# Patient Record
Sex: FEMALE | Race: WHITE | NOT HISPANIC OR LATINO | Employment: OTHER | URBAN - METROPOLITAN AREA
[De-identification: names, ages, dates, MRNs, and addresses within clinical notes are randomized per-mention and may not be internally consistent; named-entity substitution may affect disease eponyms.]

---

## 2022-01-10 DIAGNOSIS — I48.0 PAROXYSMAL ATRIAL FIBRILLATION (HCC): Primary | ICD-10-CM

## 2022-01-13 DIAGNOSIS — E78.49 OTHER HYPERLIPIDEMIA: Primary | ICD-10-CM

## 2022-01-13 NOTE — TELEPHONE ENCOUNTER
Patient only has one pill left and no refills  Her home health aid called and was unsure if she should still be taking atorvatastin 40 mg  Please send to [facility] if appropriate

## 2022-01-14 RX ORDER — ATORVASTATIN CALCIUM 40 MG/1
40 TABLET, FILM COATED ORAL DAILY
Qty: 30 TABLET | Refills: 2 | Status: SHIPPED | OUTPATIENT
Start: 2022-01-14 | End: 2022-04-26

## 2022-01-18 PROBLEM — G81.90 HEMIPLEGIA (HCC): Status: ACTIVE | Noted: 2021-10-15

## 2022-01-18 PROBLEM — E78.2 MIXED HYPERLIPIDEMIA: Status: ACTIVE | Noted: 2021-10-15

## 2022-01-18 PROBLEM — I48.0 PAROXYSMAL ATRIAL FIBRILLATION (HCC): Status: ACTIVE | Noted: 2021-10-15

## 2022-01-18 PROBLEM — Z72.0 TOBACCO ABUSE: Status: ACTIVE | Noted: 2021-10-15

## 2022-01-18 PROBLEM — I63.512 ACUTE ISCHEMIC LEFT MCA STROKE (HCC): Status: ACTIVE | Noted: 2021-10-14

## 2022-01-18 NOTE — ASSESSMENT & PLAN NOTE
Continue with anticogulation and rate control of heart rate  Concerns about condition were addressed today

## 2022-01-18 NOTE — PROGRESS NOTES
BMI Counseling: There is no height or weight on file to calculate BMI  The BMI is above normal  Nutrition recommendations include decreasing portion sizes, encouraging healthy choices of fruits and vegetables, decreasing fast food intake, consuming healthier snacks, limiting drinks that contain sugar, moderation in carbohydrate intake, increasing intake of lean protein, reducing intake of saturated and trans fat and reducing intake of cholesterol  Exercise recommendations include exercising 3-5 times per week  No pharmacotherapy was ordered  Patient referred to PCP  Rationale for BMI follow-up plan is due to patient being overweight or obese  Depression Screening and Follow-up Plan: Patient was screened for depression during today's encounter  They screened negative with a PHQ-2 score of 0  Assessment/Plan:         Problem List Items Addressed This Visit        Cardiovascular and Mediastinum    Acute ischemic left MCA stroke [facility]) - Primary     Patient is stable  and will continue present plan of care and reassess at next routine visit  All questions about this problem from patient were answered today  Paroxysmal atrial fibrillation (HCC)     Continue with anticogulation and rate control of heart rate  Concerns about condition were addressed today  Other    Mixed hyperlipidemia     Patient  is stable with current medication and we discussed a low fat low cholesterol diet  Weight loss also discussed for this will help lower cholesterol also  Recheck lipids in 6 months  Tobacco abuse     Patient encouraged to quit using tobacco for that increases their risk for COPD, lung cancer,stroke, oral cancer and heart disease  If patient does not want to quit they should let me know  when they are interested in quitting  There are numerous options to use to quit and we can discuss them             Other Visit Diagnoses     Need for hepatitis C screening test        Screening for HIV (human immunodeficiency virus)        Screening for cervical cancer        Encounter for screening mammogram for breast cancer        Screening for colorectal cancer        Well adult exam                Subjective:      Patient ID: Garrett Lema is a 63 y o  female  She 7year-old female here today for annual wellness and physical   Patient with history of atrial fibrillation with a CVA with right-sided weakness  Patient history of hypertension and hyperlipidemia  Patient also with some questionable symptoms of depression  Patient not sure she was taking the medications but I will give her prescription for some Zoloft 50 mg to be taken once a day if she so desires to take that  The following portions of the patient's history were reviewed and updated as appropriate:   Past Medical History:  She has no past medical history on file ,  _______________________________________________________________________  Medical Problems:  does not have any pertinent problems on file ,  _______________________________________________________________________  Past Surgical History:   has a past surgical history that includes Ovary surgery  ,  _______________________________________________________________________  Family History:  family history is not on file ,  _______________________________________________________________________  Social History:   reports that she has quit smoking  She has never used smokeless tobacco  She reports previous alcohol use  She reports previous drug use ,  _______________________________________________________________________  Allergies:  has No Known Allergies     _______________________________________________________________________  Current Outpatient Medications   Medication Sig Dispense Refill    apixaban (ELIQUIS) 5 mg Take 1 tablet (5 mg total) by mouth 2 (two) times a day 60 tablet 11    atorvastatin (LIPITOR) 40 mg tablet Take 1 tablet (40 mg total) by mouth daily 30 tablet 2    metoprolol succinate (Toprol XL) 25 mg 24 hr tablet Take 1 tablet (25 mg total) by mouth daily 90 tablet 3     No current facility-administered medications for this visit      _______________________________________________________________________  Review of Systems   Constitutional: Negative for activity change, appetite change, fatigue and fever  HENT: Negative for congestion, ear pain, postnasal drip, rhinorrhea, sinus pressure, sinus pain, sneezing and sore throat  Eyes: Negative for pain and redness  Respiratory: Negative for apnea, cough, chest tightness, shortness of breath and wheezing  Cardiovascular: Negative for chest pain, palpitations and leg swelling  Gastrointestinal: Negative for abdominal pain, constipation, diarrhea, nausea and vomiting  Endocrine: Negative for cold intolerance and heat intolerance  Genitourinary: Negative for difficulty urinating, dysuria, frequency, hematuria and urgency  Musculoskeletal: Negative for arthralgias, back pain, gait problem and myalgias  Skin: Negative for rash  Neurological: Negative for dizziness, speech difficulty, weakness, numbness and headaches  Hematological: Does not bruise/bleed easily  Psychiatric/Behavioral: Negative for agitation, confusion and hallucinations  Objective: There were no vitals filed for this visit  There is no height or weight on file to calculate BMI  Physical Exam  Vitals and nursing note reviewed  Constitutional:       Appearance: She is well-developed  HENT:      Head: Normocephalic and atraumatic  Nose: Nose normal       Mouth/Throat:      Mouth: Mucous membranes are moist    Eyes:      General: No scleral icterus  Conjunctiva/sclera: Conjunctivae normal       Pupils: Pupils are equal, round, and reactive to light  Neck:      Thyroid: No thyromegaly  Cardiovascular:      Rate and Rhythm: Normal rate  Rhythm irregular     Pulmonary:      Effort: Pulmonary effort is normal  Breath sounds: Normal breath sounds  No wheezing  Abdominal:      General: Bowel sounds are normal  There is no distension  Palpations: Abdomen is soft  Tenderness: There is no abdominal tenderness  There is no guarding or rebound  Musculoskeletal:         General: Tenderness and deformity present  Normal range of motion  Cervical back: Normal range of motion and neck supple  Comments: Weakness on R side  Skin:     General: Skin is warm and dry  Findings: No erythema or rash  Neurological:      Mental Status: She is alert and oriented to person, place, and time  Sensory: No sensory deficit  Motor: Weakness present  Gait: Gait abnormal    Psychiatric:         Behavior: Behavior normal          Thought Content:  Thought content normal          Judgment: Judgment normal

## 2022-01-19 ENCOUNTER — OFFICE VISIT (OUTPATIENT)
Dept: FAMILY MEDICINE CLINIC | Facility: CLINIC | Age: 62
End: 2022-01-19
Payer: COMMERCIAL

## 2022-01-19 VITALS
OXYGEN SATURATION: 97 % | SYSTOLIC BLOOD PRESSURE: 118 MMHG | HEART RATE: 70 BPM | TEMPERATURE: 97.3 F | BODY MASS INDEX: 25.16 KG/M2 | RESPIRATION RATE: 18 BRPM | DIASTOLIC BLOOD PRESSURE: 68 MMHG | WEIGHT: 142 LBS | HEIGHT: 63 IN

## 2022-01-19 DIAGNOSIS — Z72.0 TOBACCO ABUSE: ICD-10-CM

## 2022-01-19 DIAGNOSIS — I63.512 ACUTE ISCHEMIC LEFT MCA STROKE (HCC): Primary | ICD-10-CM

## 2022-01-19 DIAGNOSIS — E78.2 MIXED HYPERLIPIDEMIA: ICD-10-CM

## 2022-01-19 DIAGNOSIS — F32.A DEPRESSION, UNSPECIFIED DEPRESSION TYPE: ICD-10-CM

## 2022-01-19 DIAGNOSIS — Z12.31 ENCOUNTER FOR SCREENING MAMMOGRAM FOR BREAST CANCER: ICD-10-CM

## 2022-01-19 DIAGNOSIS — Z11.4 SCREENING FOR HIV (HUMAN IMMUNODEFICIENCY VIRUS): ICD-10-CM

## 2022-01-19 DIAGNOSIS — Z00.00 WELL ADULT EXAM: ICD-10-CM

## 2022-01-19 DIAGNOSIS — Z12.11 SCREENING FOR COLORECTAL CANCER: ICD-10-CM

## 2022-01-19 DIAGNOSIS — Z12.12 SCREENING FOR COLORECTAL CANCER: ICD-10-CM

## 2022-01-19 DIAGNOSIS — Z11.59 NEED FOR HEPATITIS C SCREENING TEST: ICD-10-CM

## 2022-01-19 DIAGNOSIS — I48.0 PAROXYSMAL ATRIAL FIBRILLATION (HCC): ICD-10-CM

## 2022-01-19 DIAGNOSIS — Z12.4 SCREENING FOR CERVICAL CANCER: ICD-10-CM

## 2022-01-19 PROCEDURE — 99396 PREV VISIT EST AGE 40-64: CPT | Performed by: FAMILY MEDICINE

## 2022-01-27 ENCOUNTER — TELEPHONE (OUTPATIENT)
Dept: FAMILY MEDICINE CLINIC | Facility: CLINIC | Age: 62
End: 2022-01-27

## 2022-01-27 NOTE — TELEPHONE ENCOUNTER
Zonia called from [facility] OT stating that pt son called her today and cancelled her OT appt  Pt c/o gum pain, HA, and just feeling weird  They suspect that this may be due to the recent start of the zoloft  Will resume OT next week as scheduled

## 2022-02-03 ENCOUNTER — CONSULT (OUTPATIENT)
Dept: CARDIOLOGY CLINIC | Facility: MEDICAL CENTER | Age: 62
End: 2022-02-03
Payer: COMMERCIAL

## 2022-02-03 VITALS
BODY MASS INDEX: 25.16 KG/M2 | HEIGHT: 63 IN | SYSTOLIC BLOOD PRESSURE: 118 MMHG | OXYGEN SATURATION: 96 % | DIASTOLIC BLOOD PRESSURE: 70 MMHG | WEIGHT: 142 LBS | HEART RATE: 60 BPM

## 2022-02-03 DIAGNOSIS — I48.0 PAROXYSMAL ATRIAL FIBRILLATION (HCC): Primary | ICD-10-CM

## 2022-02-03 DIAGNOSIS — E78.2 MIXED HYPERLIPIDEMIA: ICD-10-CM

## 2022-02-03 DIAGNOSIS — I69.351 HEMIPLEGIA OF RIGHT DOMINANT SIDE AS LATE EFFECT OF CEREBRAL INFARCTION, UNSPECIFIED HEMIPLEGIA TYPE (HCC): ICD-10-CM

## 2022-02-03 DIAGNOSIS — I63.512 ACUTE ISCHEMIC LEFT MCA STROKE (HCC): ICD-10-CM

## 2022-02-03 PROBLEM — Z72.0 TOBACCO ABUSE: Status: RESOLVED | Noted: 2021-10-15 | Resolved: 2022-02-03

## 2022-02-03 PROCEDURE — 93000 ELECTROCARDIOGRAM COMPLETE: CPT | Performed by: INTERNAL MEDICINE

## 2022-02-03 PROCEDURE — 99205 OFFICE O/P NEW HI 60 MIN: CPT | Performed by: INTERNAL MEDICINE

## 2022-02-03 RX ORDER — LANOLIN ALCOHOL/MO/W.PET/CERES
100 CREAM (GRAM) TOPICAL DAILY
Status: ON HOLD | COMMUNITY
Start: 2021-11-18 | End: 2022-11-18

## 2022-02-03 NOTE — ASSESSMENT & PLAN NOTE
Resultant right-sided weakness  Functionally limited  Blood pressure controlled  Continue secondary prevention measures  Continue anticoagulation to lower risk of recurrent stroke  Recent CT, CTA reports personally reviewed  Most likely etiology embolic from the atrial fibrillation  Discussed anticoagulation to prevent recurrent stroke  Risks and benefits discussed  Patient and family expressed understanding

## 2022-02-03 NOTE — PROGRESS NOTES
[facility] [place]  [address] [address] [address]  Phone#  [phone]  Fax#  [phone]  [facility] Cardiology Office Consultation             NAME: Bethany Melchor  AGE: 63 y o  SEX: female   : 1960   MRN: [id number]    DATE: 2/3/2022  TIME: 12:11 PM    Assessment and plan:    1  Paroxysmal atrial fibrillation [facility])  Assessment & Plan:  Was in sinus rhythm back in December  Currently on beta-blockers and anticoagulation  No bleeding reported  Echocardiogram from [facility] reviewed  This showed preserved ejection fraction and no significant valvular heart disease  Grade 1 diastolic dysfunction was noted  EKG today shows sinus bradycardia  Nonspecific T-wave inversion in V1 V2 noted  She was not symptomatic from the AFib previously  We will continue current management with anticoagulation and beta-blockers  Advised to avoid aspirin or NSAIDs  Orders:  -     Ambulatory referral to Cardiology  -     POCT ECG    2  Mixed hyperlipidemia  Assessment & Plan:  Last HDL 47    Managed by primary care  Continue statin therapy  3  Acute ischemic left MCA stroke [facility])  Assessment & Plan:  Resultant right-sided weakness  Functionally limited  Blood pressure controlled  Continue secondary prevention measures  Continue anticoagulation to lower risk of recurrent stroke  Recent CT, CTA reports personally reviewed  Most likely etiology embolic from the atrial fibrillation  Discussed anticoagulation to prevent recurrent stroke  Risks and benefits discussed  Patient and family expressed understanding  4  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, unspecified hemiplegia type [facility])  Assessment & Plan:  Functionally limited because of recent stroke  Ambulating with the assistance of a support  Nevertheless the right leg is much stronger  The right time is still very weak in a sling    Still doing occupational therapy  Can move her fingers now  Speech is improved  Chief Complaint   Patient presents with    Advice Only     patient referred by PCP after stroke, afib       HPI:    Bethany Melchor is a 63y o -year-old female who presents to the cardiology clinic for evaluation in view of paroxysmal atrial fibrillation  She is accompanied here by her son, who provides much of the history  She presented to the [facility] on 10/14/2021 with aphasia and right-sided weakness  Stroke alert was called  She was found to be in atrial fibrillation with controlled ventricular response  CT head showed acute left MCA territory infarct with occluded left M1 segment and filling defect of the transverse sinus  She is not a tPA candidate due to unknown duration of symptoms  She was managed medically  She had some hemorrhagic transformation but did not require intervention  She was begun on anticoagulation for the atrial fibrillation after 2 week delay  Her aphasia improved slightly  She had mild thrombocytopenia after starting aspirin  Ambulating with the assistance of a support  Her right leg is much stronger  The right arm is still very weak  Still doing occupational therapy  Can move her fingers now  Speech is improved  She has history of paroxysmal atrial fibrillation with left MCA CVA with resultant right-sided weakness, hypertension, dyslipidemia and previous tobacco dependence  Her EKG on 12/17/2021 showed sinus rhythm  She is currently on anticoagulation and beta-blockers  Prior medical records from [facility] reviewed  CT angiogram showed occluded proximal M1 segment with normal carotid arteries  CT of the head showed left MCA CVA with moderate encephalomalacia  Echocardiogram performed during the time of the stroke showed preserved ejection fraction, normal valves and grade 1 diastolic dysfunction      Result Date: 10/20/2021   Left Atrium: Left atrium cavity size is normal   Left Ventricle: Left ventricle is normal in size  Wall thickness is normal  Systolic function is hyperdynamic with an ejection fraction over 65%  Wall motion is within normal limits  There is grade I (mild) diastolic dysfunction   Right Ventricle: Right ventricle cavity is normal  Systolic function is normal   IVC/SVC: The inferior vena cava demonstrates a diameter of <=21 mm and collapses >50%; therefore, the right atrial pressure is estimated at 0-5 mmHg  We had long discussion regarding the results of the prior hospitalization, her CT imaging, echo results and prior atrial fibrillation diagnosis  Pathophysiology of atrial fibrillation discussed  She was asymptomatic from the episode of atrial fibrillation  She denies any palpitations  She denies chest pain  Prior to the stroke she was very active  She has quit smoking after the stroke  Cardiology Problem list:  Paroxysmal atrial fibrillation:  10/21:  Echo-EF 65, grade 1 DD, normal valves  Left MCA stroke-11/21:  CT head with left MCA CVA  CTA:  Occluded proximal left M1 segment  Normal carotids  Dyslipidemia  Tobacco dependence    Past history, family history, social history, current medications, vital signs, recent lab and imaging studies and  prior cardiology studies reviewed independently on this visit      BP Readings from Last 4 Encounters:   02/03/22 118/70   01/19/22 118/68   12/17/21 126/80      Wt Readings from Last 3 Encounters:   02/03/22 64 4 kg (142 lb)   01/19/22 64 4 kg (142 lb)   12/17/21 64 2 kg (141 lb 8 oz)       No results found for: LDLCALC  No results found for: CREATININE       ALLERGIES:  No Known Allergies      Current Outpatient Medications:     apixaban (ELIQUIS) 5 mg, Take 1 tablet (5 mg total) by mouth 2 (two) times a day, Disp: 60 tablet, Rfl: 11    atorvastatin (LIPITOR) 40 mg tablet, Take 1 tablet (40 mg total) by mouth daily, Disp: 30 tablet, Rfl: 2    metoprolol succinate (Toprol XL) 25 mg 24 hr tablet, Take 1 tablet (25 mg total) by mouth daily, Disp: 90 tablet, Rfl: 3    sertraline (Zoloft) 50 mg tablet, Take 1 tablet (50 mg total) by mouth daily, Disp: 30 tablet, Rfl: 5    thiamine 100 MG tablet, Take 100 mg by mouth daily, Disp: , Rfl:       Review of Systems   Constitutional: Positive for fatigue  Negative for fever and unexpected weight change  HENT: Negative for congestion and trouble swallowing  Eyes: Negative for visual disturbance  Respiratory: Negative for chest tightness, shortness of breath and wheezing  Cardiovascular: Negative for chest pain, palpitations and leg swelling  Gastrointestinal: Negative for abdominal pain and blood in stool  Endocrine: Negative for polyuria  Genitourinary: Negative for hematuria  Musculoskeletal: Positive for arthralgias  Negative for myalgias  Skin: Negative for rash  Neurological: Positive for speech difficulty, weakness and numbness  Negative for dizziness and tremors  Hematological: Does not bruise/bleed easily  Psychiatric/Behavioral: Negative for sleep disturbance  History reviewed  No pertinent past medical history  Past Surgical History:   Procedure Laterality Date    OVARY SURGERY         History reviewed  No pertinent family history  Social History   reports that she has quit smoking  She has never used smokeless tobacco  She reports previous alcohol use  She reports previous drug use  Objective:     Vitals:    02/03/22 1143   BP: 118/70   Pulse: 60   SpO2: 96%     Wt Readings from Last 3 Encounters:   02/03/22 64 4 kg (142 lb)   01/19/22 64 4 kg (142 lb)   12/17/21 64 2 kg (141 lb 8 oz)     Pulse Readings from Last 3 Encounters:   02/03/22 60   01/19/22 70   12/17/21 78     BP Readings from Last 3 Encounters:   02/03/22 118/70   01/19/22 118/68   12/17/21 126/80         Physical Exam  Constitutional:       General: She is not in acute distress  HENT:      Head: Normocephalic     Eyes:      Conjunctiva/sclera: Conjunctivae normal    Neck:      Vascular: No carotid bruit  Cardiovascular:      Rate and Rhythm: Normal rate and regular rhythm  Heart sounds: S1 normal and S2 normal  No murmur heard  Pulmonary:      Effort: Pulmonary effort is normal       Breath sounds: Normal breath sounds  Abdominal:      General: Bowel sounds are normal  There is no distension  Musculoskeletal:      Right lower leg: No edema  Left lower leg: No edema  Skin:     General: Skin is warm  Neurological:      Motor: Weakness present  Gait: Gait abnormal       Comments:  Right leg is stronger  Right arm is very weak  Psychiatric:         Mood and Affect: Mood normal          Pertinent Laboratory/Diagnostic Studies:    Laboratory studies reviewed personally by Christel Montemayor MD    Imaging Studies:     CT and MRI reports  reviewed      Cardiac testing:   EKG reviewed personally: sinus bradycardia, anterior T-wave inversion  EKG on 12/17/2021:  Showed sinus rhythm with left atrial enlargement    Orders Placed This Encounter   Procedures    POCT ECG       Counseling / Coordination of Care  Total office time spent today 65 minutes  Greater than 50% of total time was spent with the patient and / or family counseling and / or coordination of care  Francisco Javier Allan MD, [facility]    Portions of the record may have been created with voice recognition software  Occasional wrong word or "sound a like" substitutions may have occurred due to the inherent limitations of voice recognition software  Read the chart carefully and recognize, using context, where substitutions have occurred  If you have any questions or concerns about the context, text or information contained within the body of this dictation, please contact myself, the provider, for further clarification

## 2022-02-03 NOTE — LETTER
February 3, 2022     Shyanne Aguilar MD  [id number] [facility] [address]    Patient: Bertrand Patel   YOB: 1960   Date of Visit: 2/3/2022       Dear Dr Juan Candelaria: Thank you for referring Bertrand Patel to me for evaluation  Below are my notes for this consultation  If you have questions, please do not hesitate to call me  I look forward to following your patient along with you  Sincerely,        Antonella Davenport MD        CC: No Recipients  Antonella Davenport MD  2/3/2022 12:14 PM  Sign when Signing Visit   [facility]  [address] [address]  Phone#  [phone]  Fax#  [phone]  [facility] Consultation             NAME: Bertrand Patel  AGE: 63 y o  SEX: female   : 1960   MRN: [id number]    DATE: 2/3/2022  TIME: 12:11 PM    Assessment and plan:    1  Paroxysmal atrial fibrillation [facility])  Assessment & Plan:  Was in sinus rhythm back in December  Currently on beta-blockers and anticoagulation  No bleeding reported  Echocardiogram from [facility] reviewed  This showed preserved ejection fraction and no significant valvular heart disease  Grade 1 diastolic dysfunction was noted  EKG today shows sinus bradycardia  Nonspecific T-wave inversion in V1 V2 noted  She was not symptomatic from the AFib previously  We will continue current management with anticoagulation and beta-blockers  Advised to avoid aspirin or NSAIDs  Orders:  -     Ambulatory referral to Cardiology  -     POCT ECG    2  Mixed hyperlipidemia  Assessment & Plan:  Last HDL 47    Managed by primary care  Continue statin therapy  3  Acute ischemic left MCA stroke [facility])  Assessment & Plan:  Resultant right-sided weakness  Functionally limited  Blood pressure controlled  Continue secondary prevention measures  Continue anticoagulation to lower risk of recurrent stroke  Recent CT, CTA reports personally reviewed    Most likely etiology embolic from the atrial fibrillation  Discussed anticoagulation to prevent recurrent stroke  Risks and benefits discussed  Patient and family expressed understanding  4  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, unspecified hemiplegia type [facility])  Assessment & Plan:  Functionally limited because of recent stroke  Ambulating with the assistance of a support  Nevertheless the right leg is much stronger  The right time is still very weak in a sling  Still doing occupational therapy  Can move her fingers now  Speech is improved  Chief Complaint   Patient presents with    Advice Only     patient referred by PCP after stroke, afib       HPI:    Wai Henning is a 63y o -year-old female who presents to the cardiology clinic for evaluation in view of paroxysmal atrial fibrillation  She is accompanied here by her son, who provides much of the history  She presented to the [facility] on 10/14/2021 with aphasia and right-sided weakness  Stroke alert was called  She was found to be in atrial fibrillation with controlled ventricular response  CT head showed acute left MCA territory infarct with occluded left M1 segment and filling defect of the transverse sinus  She is not a tPA candidate due to unknown duration of symptoms  She was managed medically  She had some hemorrhagic transformation but did not require intervention  She was begun on anticoagulation for the atrial fibrillation after 2 week delay  Her aphasia improved slightly  She had mild thrombocytopenia after starting aspirin  Ambulating with the assistance of a support  Her right leg is much stronger  The right arm is still very weak  Still doing occupational therapy  Can move her fingers now  Speech is improved        She has history of paroxysmal atrial fibrillation with left MCA CVA with resultant right-sided weakness, hypertension, dyslipidemia and previous tobacco dependence  Her EKG on 12/17/2021 showed sinus rhythm  She is currently on anticoagulation and beta-blockers  Prior medical records from [facility] reviewed  CT angiogram showed occluded proximal M1 segment with normal carotid arteries  CT of the head showed left MCA CVA with moderate encephalomalacia  Echocardiogram performed during the time of the stroke showed preserved ejection fraction, normal valves and grade 1 diastolic dysfunction  Result Date: 10/20/2021   Left Atrium: Left atrium cavity size is normal   Left Ventricle: Left ventricle is normal in size  Wall thickness is normal  Systolic function is hyperdynamic with an ejection fraction over 65%  Wall motion is within normal limits  There is grade I (mild) diastolic dysfunction   Right Ventricle: Right ventricle cavity is normal  Systolic function is normal   IVC/SVC: The inferior vena cava demonstrates a diameter of <=21 mm and collapses >50%; therefore, the right atrial pressure is estimated at 0-5 mmHg  We had long discussion regarding the results of the prior hospitalization, her CT imaging, echo results and prior atrial fibrillation diagnosis  Pathophysiology of atrial fibrillation discussed  She was asymptomatic from the episode of atrial fibrillation  She denies any palpitations  She denies chest pain  Prior to the stroke she was very active  She has quit smoking after the stroke  Cardiology Problem list:  Paroxysmal atrial fibrillation:  10/21:  Echo-EF 65, grade 1 DD, normal valves  Left MCA stroke-11/21:  CT head with left MCA CVA  CTA:  Occluded proximal left M1 segment  Normal carotids  Dyslipidemia  Tobacco dependence    Past history, family history, social history, current medications, vital signs, recent lab and imaging studies and  prior cardiology studies reviewed independently on this visit      BP Readings from Last 4 Encounters:   02/03/22 118/70   01/19/22 118/68   12/17/21 126/80      Wt Readings from Last 3 Encounters:   02/03/22 64 4 kg (142 lb)   01/19/22 64 4 kg (142 lb)   12/17/21 64 2 kg (141 lb 8 oz)       No results found for: LDLCALC  No results found for: CREATININE       ALLERGIES:  No Known Allergies      Current Outpatient Medications:     apixaban (ELIQUIS) 5 mg, Take 1 tablet (5 mg total) by mouth 2 (two) times a day, Disp: 60 tablet, Rfl: 11    atorvastatin (LIPITOR) 40 mg tablet, Take 1 tablet (40 mg total) by mouth daily, Disp: 30 tablet, Rfl: 2    metoprolol succinate (Toprol XL) 25 mg 24 hr tablet, Take 1 tablet (25 mg total) by mouth daily, Disp: 90 tablet, Rfl: 3    sertraline (Zoloft) 50 mg tablet, Take 1 tablet (50 mg total) by mouth daily, Disp: 30 tablet, Rfl: 5    thiamine 100 MG tablet, Take 100 mg by mouth daily, Disp: , Rfl:       Review of Systems   Constitutional: Positive for fatigue  Negative for fever and unexpected weight change  HENT: Negative for congestion and trouble swallowing  Eyes: Negative for visual disturbance  Respiratory: Negative for chest tightness, shortness of breath and wheezing  Cardiovascular: Negative for chest pain, palpitations and leg swelling  Gastrointestinal: Negative for abdominal pain and blood in stool  Endocrine: Negative for polyuria  Genitourinary: Negative for hematuria  Musculoskeletal: Positive for arthralgias  Negative for myalgias  Skin: Negative for rash  Neurological: Positive for speech difficulty, weakness and numbness  Negative for dizziness and tremors  Hematological: Does not bruise/bleed easily  Psychiatric/Behavioral: Negative for sleep disturbance  History reviewed  No pertinent past medical history  Past Surgical History:   Procedure Laterality Date    OVARY SURGERY         History reviewed  No pertinent family history  Social History   reports that she has quit smoking  She has never used smokeless tobacco  She reports previous alcohol use  She reports previous drug use  Objective:     Vitals:    02/03/22 1143   BP: 118/70   Pulse: 60   SpO2: 96%     Wt Readings from Last 3 Encounters:   02/03/22 64 4 kg (142 lb)   01/19/22 64 4 kg (142 lb)   12/17/21 64 2 kg (141 lb 8 oz)     Pulse Readings from Last 3 Encounters:   02/03/22 60   01/19/22 70   12/17/21 78     BP Readings from Last 3 Encounters:   02/03/22 118/70   01/19/22 118/68   12/17/21 126/80         Physical Exam  Constitutional:       General: She is not in acute distress  HENT:      Head: Normocephalic  Eyes:      Conjunctiva/sclera: Conjunctivae normal    Neck:      Vascular: No carotid bruit  Cardiovascular:      Rate and Rhythm: Normal rate and regular rhythm  Heart sounds: S1 normal and S2 normal  No murmur heard  Pulmonary:      Effort: Pulmonary effort is normal       Breath sounds: Normal breath sounds  Abdominal:      General: Bowel sounds are normal  There is no distension  Musculoskeletal:      Right lower leg: No edema  Left lower leg: No edema  Skin:     General: Skin is warm  Neurological:      Motor: Weakness present  Gait: Gait abnormal       Comments:  Right leg is stronger  Right arm is very weak  Psychiatric:         Mood and Affect: Mood normal          Pertinent Laboratory/Diagnostic Studies:    Laboratory studies reviewed personally by Donnamarie Rubinstein, MD    Imaging Studies:     CT and MRI reports  reviewed      Cardiac testing:   EKG reviewed personally: sinus bradycardia, anterior T-wave inversion  EKG on 12/17/2021:  Showed sinus rhythm with left atrial enlargement    Orders Placed This Encounter   Procedures    POCT ECG       Counseling / Coordination of Care  Total office time spent today 65 minutes  Greater than 50% of total time was spent with the patient and / or family counseling and / or coordination of care  Ilene Donald MD, [facility]    Portions of the record may have been created with voice recognition software    Occasional wrong word or "sound a like" substitutions may have occurred due to the inherent limitations of voice recognition software  Read the chart carefully and recognize, using context, where substitutions have occurred  If you have any questions or concerns about the context, text or information contained within the body of this dictation, please contact myself, the provider, for further clarification

## 2022-02-03 NOTE — ASSESSMENT & PLAN NOTE
Was in sinus rhythm back in December  Currently on beta-blockers and anticoagulation  No bleeding reported  Echocardiogram from [facility] reviewed  This showed preserved ejection fraction and no significant valvular heart disease  Grade 1 diastolic dysfunction was noted  EKG today shows sinus bradycardia  Nonspecific T-wave inversion in V1 V2 noted  She was not symptomatic from the AFib previously  We will continue current management with anticoagulation and beta-blockers  Advised to avoid aspirin or NSAIDs

## 2022-02-03 NOTE — PATIENT INSTRUCTIONS
You were diagnosed to have atrial fibrillation recently  This is an irregular heartbeat arising from the upper cardiac chamber  This is a risk factor for stroke  Your echocardiogram performed at [facility] did not show significant abnormalities  These results are reassuring  I would recommend continuation of current medicines including metoprolol and Eliquis to lower your risk of stroke  Atrial Fibrillation management:  -Continue current cardiac medications  -Maintain ideal body weight (weight loss helps lower A fib risk if you are overweight)  -Regular walking and increased physical activity is beneficial   -Eliminate alcohol intake   -Avoid smoking or recreational drug use  -Use CPAP if you have sleep apnea  -Optimal control of  blood pressure and blood sugars  A-fib (Atrial Fibrillation) or Atrial flutter  WHAT YOU NEED TO KNOW:   A-fib may come and go, or it may be a long-term condition  A-fib can cause blood clots, stroke, or heart failure  These conditions may become life-threatening  It is important to treat and manage a-fib to help prevent a blood clot, stroke, or heart failure  Medicines: You may need any of the following:  · Heart medicines  help control your heart rate or rhythm  You may need more than one medicine to treat your symptoms  · Blood thinners: (Eliquis, Xarelto, Pradaxa or Warfarin/coumadin)  help prevent blood clots  Clots can cause strokes, heart attacks, and death  The following are general safety guidelines to follow while you are taking a blood thinner:    ? Watch for bleeding and bruising while you take blood thinners  Watch for bleeding from your gums or nose  Watch for blood in your urine and bowel movements  Use a soft washcloth on your skin, and a soft toothbrush to brush your teeth  This can keep your skin and gums from bleeding  If you shave, use an electric shaver  Do not play contact sports       ? Tell your dentist and other healthcare providers that you take a blood thinner  Wear a bracelet or necklace that says you take this medicine  ? Do not start or stop any other medicines unless your healthcare provider tells you to  Many medicines cannot be used with blood thinners  ? Take your blood thinner exactly as prescribed by your healthcare provider  Do not skip does or take less than prescribed  Tell your provider right away if you forget to take your blood thinner, or if you take too much  ? The following are things you should be aware of if you take warfarin:     § Foods and medicines can affect the amount of warfarin in your blood  Do not make major changes to your diet while you take warfarin  Warfarin works best when you eat about the same amount of vitamin K every day  Vitamin K is found in green leafy vegetables and certain other foods  Ask for more information about what to eat when you are taking warfarin  § You will need to see your healthcare provider for follow-up visits when you are on warfarin  You will need regular blood tests  These tests are used to decide how much medicine you need  · Take your medicine as directed  Keep a list of the medicines, vitamins, and herbs you take  Include the amounts, and when and why you take them  Bring the list or the pill bottles to follow-up visits  Carry your medicine list with you in case of an emergency  Manage A-fib:   · Know your target heart rate ()  Learn how to check your pulse and monitor your heart rate  Count your pulse for one minute to get an accurate reading  · Avoid alcohol  Alcohol can make a-fib hard to manage  · Do not smoke  Nicotine can cause heart damage and make it more difficult to manage your a-fib  Do not use e-cigarettes or smokeless tobacco in place of cigarettes or to help you quit  They still contain nicotine  Ask your healthcare provider for information if you currently smoke and need help quitting      · Eat heart-healthy foods  Heart healthy foods will help keep your cholesterol low  These include fruits, vegetables, whole-grain breads, low-fat dairy products, beans, lean meats, and fish  Replace butter and margarine with heart-healthy oils such as olive oil and canola oil  · Maintain a healthy weight  Ask your healthcare provider how much you should weigh  Ask him or her to help you create a safe weight loss plan if you are overweight  Even a small goal of a 10% weight loss can improve your heart health  · Get regular physical activity  Physical activity helps improve your heart health  Get at least 150 minutes of moderate aerobic physical activity each week  Your healthcare provider can help you create an activity plan  · Manage other health conditions  This includes high blood pressure or cholesterol, sleep apnea, diabetes, coronary disease, heart failure and kidney disease  Take medicine as directed and follow your treatment plan  The above information is an  only  It is not intended as medical advice for individual conditions or treatments  Talk to your doctor, nurse or pharmacist before following any medical regimen to see if it is safe and effective for you

## 2022-02-03 NOTE — ASSESSMENT & PLAN NOTE
Functionally limited because of recent stroke  Ambulating with the assistance of a support  Nevertheless the right leg is much stronger  The right arm  is still very weak and in a sling  Still doing occupational therapy  Can move her fingers now  Speech is improved

## 2022-02-04 ENCOUNTER — TELEPHONE (OUTPATIENT)
Dept: FAMILY MEDICINE CLINIC | Facility: CLINIC | Age: 62
End: 2022-02-04

## 2022-02-04 NOTE — TELEPHONE ENCOUNTER
Patient will not be having OT today due to driveway being too slippery  She will resume next Thursday

## 2022-02-08 ENCOUNTER — TELEPHONE (OUTPATIENT)
Dept: FAMILY MEDICINE CLINIC | Facility: CLINIC | Age: 62
End: 2022-02-08

## 2022-02-08 DIAGNOSIS — I63.512 ACUTE ISCHEMIC LEFT MCA STROKE (HCC): Primary | ICD-10-CM

## 2022-02-08 DIAGNOSIS — I69.351 HEMIPLEGIA OF RIGHT DOMINANT SIDE AS LATE EFFECT OF CEREBRAL INFARCTION, UNSPECIFIED HEMIPLEGIA TYPE (HCC): ICD-10-CM

## 2022-02-08 NOTE — TELEPHONE ENCOUNTER
Syeda from [id number] called, she would like to know if you can write a script for PT/OT and Speech Eval and Treat for patient    I can fax to SVETA Restrepo @ [phone] and Irina Baumann @ [phone]

## 2022-02-09 ENCOUNTER — TELEPHONE (OUTPATIENT)
Dept: FAMILY MEDICINE CLINIC | Facility: CLINIC | Age: 62
End: 2022-02-09

## 2022-02-09 NOTE — TELEPHONE ENCOUNTER
Juliane Quinteros, patients son called, he said his mom does not want to take the Zoloft anymore and it was his understanding she was supposed to take it for six months, he would like you to call him back at [phone]

## 2022-04-20 ENCOUNTER — OFFICE VISIT (OUTPATIENT)
Dept: FAMILY MEDICINE CLINIC | Facility: CLINIC | Age: 62
End: 2022-04-20
Payer: COMMERCIAL

## 2022-04-20 VITALS
WEIGHT: 140 LBS | HEIGHT: 63 IN | HEART RATE: 55 BPM | DIASTOLIC BLOOD PRESSURE: 72 MMHG | SYSTOLIC BLOOD PRESSURE: 114 MMHG | OXYGEN SATURATION: 96 % | BODY MASS INDEX: 24.8 KG/M2 | TEMPERATURE: 97.3 F

## 2022-04-20 DIAGNOSIS — I48.0 PAROXYSMAL ATRIAL FIBRILLATION (HCC): ICD-10-CM

## 2022-04-20 DIAGNOSIS — I63.512 ACUTE ISCHEMIC LEFT MCA STROKE (HCC): Primary | ICD-10-CM

## 2022-04-20 DIAGNOSIS — I69.351 HEMIPLEGIA OF RIGHT DOMINANT SIDE AS LATE EFFECT OF CEREBRAL INFARCTION, UNSPECIFIED HEMIPLEGIA TYPE (HCC): ICD-10-CM

## 2022-04-20 DIAGNOSIS — E78.2 MIXED HYPERLIPIDEMIA: ICD-10-CM

## 2022-04-20 PROCEDURE — 99214 OFFICE O/P EST MOD 30 MIN: CPT | Performed by: FAMILY MEDICINE

## 2022-04-20 NOTE — PROGRESS NOTES
Assessment/Plan:         Problem List Items Addressed This Visit        Cardiovascular and Mediastinum    Acute ischemic left MCA stroke [facility]) - Primary     Patient is stable  and will continue present plan of care and reassess at next routine visit  All questions about this problem from patient were answered today  Paroxysmal atrial fibrillation (HCC)     Continue with anticogulation and rate control of heart rate  Concerns about condition were addressed today  Nervous and Auditory    Hemiplegia [facility])     Patient is stable  and will continue present plan of care and reassess at next routine visit  All questions about this problem from patient were answered today  Other    Mixed hyperlipidemia     Patient  is stable with current medication and we discussed a low fat low cholesterol diet  Weight loss also discussed for this will help lower cholesterol also  Recheck lipids in 6 months  Subjective:      Patient ID: Sun Castellon is a 63 y o  female  This is a 89year-old female here today for checkup on multiple medical problems  Patient with a history of middle cerebral artery stroke from atrial fibrillation  Patient also with history of hypertension and history of tobacco abuse  Patient is seen today with a family member and is doing quite well  The patient's speech is somewhat labored but she is speaking well she is able to communicate she also does no longer has a walker she now has a quad cane and she does have some movement in her right arm which is flaccid paralysis last time I saw her  Overall her she has improved from her stroke when I 1st saw her she is awaiting go away to go yet I have discussed with her the importance of trying to exercise and try and use dose pathways to the been damaged by her stroke  She is doing well with her prescriptions needs no refills and she will call when they are appropriate        The following portions of the patient's history were reviewed and updated as appropriate:   Past Medical History:  She has no past medical history on file ,  _______________________________________________________________________  Medical Problems:  does not have any pertinent problems on file ,  _______________________________________________________________________  Past Surgical History:   has a past surgical history that includes Ovary surgery  ,  _______________________________________________________________________  Family History:  family history is not on file ,  _______________________________________________________________________  Social History:   reports that she has quit smoking  She has never used smokeless tobacco  She reports previous alcohol use  She reports previous drug use ,  _______________________________________________________________________  Allergies:  has No Known Allergies     _______________________________________________________________________  Current Outpatient Medications   Medication Sig Dispense Refill    apixaban (ELIQUIS) 5 mg Take 1 tablet (5 mg total) by mouth 2 (two) times a day 60 tablet 11    atorvastatin (LIPITOR) 40 mg tablet Take 1 tablet (40 mg total) by mouth daily 30 tablet 2    metoprolol succinate (Toprol XL) 25 mg 24 hr tablet Take 1 tablet (25 mg total) by mouth daily 90 tablet 3    sertraline (Zoloft) 50 mg tablet Take 1 tablet (50 mg total) by mouth daily 30 tablet 5    thiamine 100 MG tablet Take 100 mg by mouth daily       No current facility-administered medications for this visit      _______________________________________________________________________  Review of Systems   Constitutional: Negative for activity change, appetite change, fatigue and fever  HENT: Negative for congestion, ear pain, postnasal drip, rhinorrhea, sinus pressure, sinus pain, sneezing and sore throat  Eyes: Negative for pain and redness     Respiratory: Negative for apnea, cough, chest tightness, shortness of breath and wheezing  Cardiovascular: Negative for chest pain, palpitations and leg swelling  Gastrointestinal: Negative for abdominal pain, constipation, diarrhea, nausea and vomiting  Endocrine: Negative for cold intolerance and heat intolerance  Genitourinary: Negative for difficulty urinating, dysuria, frequency, hematuria and urgency  Musculoskeletal: Positive for gait problem  Negative for arthralgias, back pain and myalgias  Skin: Negative for rash  Neurological: Positive for weakness  Negative for dizziness, speech difficulty, numbness and headaches  Hematological: Does not bruise/bleed easily  Psychiatric/Behavioral: Negative for agitation, confusion and hallucinations  Objective:  Vitals:    04/20/22 1121   BP: 114/72   BP Location: Left arm   Patient Position: Sitting   Cuff Size: Standard   Pulse: 55   Temp: (!) 97 3 °F (36 3 °C)   TempSrc: Skin   SpO2: 96%   Weight: 63 5 kg (140 lb)   Height: 5' 3" (1 6 m)     Body mass index is 24 8 kg/m²  Physical Exam  Vitals and nursing note reviewed  Constitutional:       Appearance: She is well-developed  HENT:      Head: Normocephalic and atraumatic  Nose: Nose normal    Eyes:      General: No scleral icterus  Conjunctiva/sclera: Conjunctivae normal       Pupils: Pupils are equal, round, and reactive to light  Neck:      Thyroid: No thyromegaly  Cardiovascular:      Rate and Rhythm: Normal rate and regular rhythm  Pulmonary:      Effort: Pulmonary effort is normal       Breath sounds: Normal breath sounds  No wheezing  Abdominal:      General: Bowel sounds are normal  There is no distension  Palpations: Abdomen is soft  Tenderness: There is no abdominal tenderness  There is no guarding or rebound  Musculoskeletal:         General: No tenderness or deformity  Normal range of motion  Cervical back: Normal range of motion and neck supple  Skin:     General: Skin is warm and dry        Findings: No erythema or rash  Neurological:      Mental Status: She is alert and oriented to person, place, and time  Sensory: No sensory deficit  Motor: Weakness present  Gait: Gait abnormal       Comments: R sided weakness   Psychiatric:         Mood and Affect: Mood normal          Behavior: Behavior normal          Thought Content:  Thought content normal          Judgment: Judgment normal

## 2022-04-26 DIAGNOSIS — E78.49 OTHER HYPERLIPIDEMIA: ICD-10-CM

## 2022-04-26 RX ORDER — ATORVASTATIN CALCIUM 40 MG/1
TABLET, FILM COATED ORAL
Qty: 30 TABLET | Refills: 2 | Status: SHIPPED | OUTPATIENT
Start: 2022-04-26 | End: 2022-07-27

## 2022-05-02 ENCOUNTER — VBI (OUTPATIENT)
Dept: ADMINISTRATIVE | Facility: OTHER | Age: 62
End: 2022-05-02

## 2022-07-27 DIAGNOSIS — E78.49 OTHER HYPERLIPIDEMIA: ICD-10-CM

## 2022-07-27 RX ORDER — ATORVASTATIN CALCIUM 40 MG/1
TABLET, FILM COATED ORAL
Qty: 90 TABLET | Refills: 1 | Status: SHIPPED | OUTPATIENT
Start: 2022-07-27

## 2022-07-29 DIAGNOSIS — F32.A DEPRESSION, UNSPECIFIED DEPRESSION TYPE: ICD-10-CM

## 2022-08-07 ENCOUNTER — HOSPITAL ENCOUNTER (INPATIENT)
Facility: HOSPITAL | Age: 62
LOS: 8 days | Discharge: NURSING, REHAB OR LONG-TERM CARE | DRG: 058 | End: 2022-08-15
Attending: EMERGENCY MEDICINE | Admitting: INTERNAL MEDICINE
Payer: COMMERCIAL

## 2022-08-07 ENCOUNTER — APPOINTMENT (EMERGENCY)
Dept: CT IMAGING | Facility: HOSPITAL | Age: 62
DRG: 058 | End: 2022-08-07
Payer: COMMERCIAL

## 2022-08-07 ENCOUNTER — APPOINTMENT (EMERGENCY)
Dept: RADIOLOGY | Facility: HOSPITAL | Age: 62
DRG: 058 | End: 2022-08-07
Payer: COMMERCIAL

## 2022-08-07 DIAGNOSIS — I69.351 HEMIPLEGIA OF RIGHT DOMINANT SIDE AS LATE EFFECT OF CEREBRAL INFARCTION, UNSPECIFIED HEMIPLEGIA TYPE (HCC): ICD-10-CM

## 2022-08-07 DIAGNOSIS — R56.9 NEW ONSET SEIZURE (HCC): ICD-10-CM

## 2022-08-07 DIAGNOSIS — I95.9 HYPOTENSION: ICD-10-CM

## 2022-08-07 DIAGNOSIS — I63.512 ACUTE ISCHEMIC LEFT MCA STROKE (HCC): ICD-10-CM

## 2022-08-07 DIAGNOSIS — I48.91 ATRIAL FIBRILLATION WITH RAPID VENTRICULAR RESPONSE (HCC): Primary | ICD-10-CM

## 2022-08-07 DIAGNOSIS — R90.89 ABNORMAL FINDING ON MRI OF BRAIN: ICD-10-CM

## 2022-08-07 PROBLEM — J69.0 ASPIRATION PNEUMONIA (HCC): Status: ACTIVE | Noted: 2022-08-07

## 2022-08-07 LAB
2HR DELTA HS TROPONIN: 4 NG/L
4HR DELTA HS TROPONIN: 5 NG/L
ALBUMIN SERPL BCP-MCNC: 3.8 G/DL (ref 3.5–5)
ALP SERPL-CCNC: 82 U/L (ref 46–116)
ALT SERPL W P-5'-P-CCNC: 19 U/L (ref 12–78)
ANION GAP SERPL CALCULATED.3IONS-SCNC: 12 MMOL/L (ref 4–13)
APAP SERPL-MCNC: <2 UG/ML (ref 10–20)
AST SERPL W P-5'-P-CCNC: 17 U/L (ref 5–45)
BASOPHILS # BLD MANUAL: 0 THOUSAND/UL (ref 0–0.1)
BASOPHILS NFR MAR MANUAL: 0 % (ref 0–1)
BILIRUB DIRECT SERPL-MCNC: 0.41 MG/DL (ref 0–0.2)
BILIRUB SERPL-MCNC: 1.27 MG/DL (ref 0.2–1)
BUN SERPL-MCNC: 11 MG/DL (ref 5–25)
CALCIUM SERPL-MCNC: 9.1 MG/DL (ref 8.3–10.1)
CARDIAC TROPONIN I PNL SERPL HS: 4 NG/L
CARDIAC TROPONIN I PNL SERPL HS: 8 NG/L
CARDIAC TROPONIN I PNL SERPL HS: 9 NG/L
CHLORIDE SERPL-SCNC: 101 MMOL/L (ref 96–108)
CO2 SERPL-SCNC: 25 MMOL/L (ref 21–32)
CREAT SERPL-MCNC: 0.78 MG/DL (ref 0.6–1.3)
EOSINOPHIL # BLD MANUAL: 0 THOUSAND/UL (ref 0–0.4)
EOSINOPHIL NFR BLD MANUAL: 0 % (ref 0–6)
ERYTHROCYTE [DISTWIDTH] IN BLOOD BY AUTOMATED COUNT: 12.3 % (ref 11.6–15.1)
ETHANOL SERPL-MCNC: <3 MG/DL (ref 0–3)
FLUAV RNA RESP QL NAA+PROBE: NEGATIVE
FLUBV RNA RESP QL NAA+PROBE: NEGATIVE
GFR SERPL CREATININE-BSD FRML MDRD: 82 ML/MIN/1.73SQ M
GLUCOSE SERPL-MCNC: 141 MG/DL (ref 65–140)
GLUCOSE SERPL-MCNC: 143 MG/DL (ref 65–140)
HCT VFR BLD AUTO: 39.3 % (ref 34.8–46.1)
HGB BLD-MCNC: 12.9 G/DL (ref 11.5–15.4)
HYPERCHROMIA BLD QL SMEAR: PRESENT
LACTATE SERPL-SCNC: 1.9 MMOL/L (ref 0.5–2)
LG PLATELETS BLD QL SMEAR: PRESENT
LYMPHOCYTES # BLD AUTO: 0.68 THOUSAND/UL (ref 0.6–4.47)
LYMPHOCYTES # BLD AUTO: 8 % (ref 14–44)
MCH RBC QN AUTO: 31.4 PG (ref 26.8–34.3)
MCHC RBC AUTO-ENTMCNC: 32.8 G/DL (ref 31.4–37.4)
MCV RBC AUTO: 96 FL (ref 82–98)
MONOCYTES # BLD AUTO: 0.34 THOUSAND/UL (ref 0–1.22)
MONOCYTES NFR BLD: 4 % (ref 4–12)
MYELOCYTES NFR BLD MANUAL: 2 % (ref 0–1)
NEUTROPHILS # BLD MANUAL: 7.34 THOUSAND/UL (ref 1.85–7.62)
NEUTS SEG NFR BLD AUTO: 86 % (ref 43–75)
NT-PROBNP SERPL-MCNC: 311 PG/ML
PLATELET # BLD AUTO: 129 THOUSANDS/UL (ref 149–390)
PLATELET BLD QL SMEAR: ABNORMAL
PMV BLD AUTO: 12.4 FL (ref 8.9–12.7)
POLYCHROMASIA BLD QL SMEAR: PRESENT
POTASSIUM SERPL-SCNC: 3.7 MMOL/L (ref 3.5–5.3)
PROCALCITONIN SERPL-MCNC: 0.81 NG/ML
PROT SERPL-MCNC: 8 G/DL (ref 6.4–8.4)
RBC # BLD AUTO: 4.11 MILLION/UL (ref 3.81–5.12)
RSV RNA RESP QL NAA+PROBE: NEGATIVE
SALICYLATES SERPL-MCNC: <3 MG/DL (ref 3–20)
SARS-COV-2 RNA RESP QL NAA+PROBE: NEGATIVE
SODIUM SERPL-SCNC: 138 MMOL/L (ref 135–147)
WBC # BLD AUTO: 8.54 THOUSAND/UL (ref 4.31–10.16)

## 2022-08-07 PROCEDURE — 84145 PROCALCITONIN (PCT): CPT | Performed by: EMERGENCY MEDICINE

## 2022-08-07 PROCEDURE — 99285 EMERGENCY DEPT VISIT HI MDM: CPT

## 2022-08-07 PROCEDURE — 70450 CT HEAD/BRAIN W/O DYE: CPT

## 2022-08-07 PROCEDURE — 36415 COLL VENOUS BLD VENIPUNCTURE: CPT | Performed by: EMERGENCY MEDICINE

## 2022-08-07 PROCEDURE — 83605 ASSAY OF LACTIC ACID: CPT | Performed by: EMERGENCY MEDICINE

## 2022-08-07 PROCEDURE — 82948 REAGENT STRIP/BLOOD GLUCOSE: CPT

## 2022-08-07 PROCEDURE — 0241U HB NFCT DS VIR RESP RNA 4 TRGT: CPT | Performed by: EMERGENCY MEDICINE

## 2022-08-07 PROCEDURE — 83880 ASSAY OF NATRIURETIC PEPTIDE: CPT | Performed by: EMERGENCY MEDICINE

## 2022-08-07 PROCEDURE — 85007 BL SMEAR W/DIFF WBC COUNT: CPT | Performed by: EMERGENCY MEDICINE

## 2022-08-07 PROCEDURE — 82077 ASSAY SPEC XCP UR&BREATH IA: CPT | Performed by: EMERGENCY MEDICINE

## 2022-08-07 PROCEDURE — 80048 BASIC METABOLIC PNL TOTAL CA: CPT | Performed by: EMERGENCY MEDICINE

## 2022-08-07 PROCEDURE — 84484 ASSAY OF TROPONIN QUANT: CPT | Performed by: EMERGENCY MEDICINE

## 2022-08-07 PROCEDURE — 85027 COMPLETE CBC AUTOMATED: CPT | Performed by: EMERGENCY MEDICINE

## 2022-08-07 PROCEDURE — 80143 DRUG ASSAY ACETAMINOPHEN: CPT | Performed by: EMERGENCY MEDICINE

## 2022-08-07 PROCEDURE — 84484 ASSAY OF TROPONIN QUANT: CPT | Performed by: INTERNAL MEDICINE

## 2022-08-07 PROCEDURE — 96361 HYDRATE IV INFUSION ADD-ON: CPT

## 2022-08-07 PROCEDURE — 93005 ELECTROCARDIOGRAM TRACING: CPT

## 2022-08-07 PROCEDURE — 99223 1ST HOSP IP/OBS HIGH 75: CPT | Performed by: INTERNAL MEDICINE

## 2022-08-07 PROCEDURE — 71045 X-RAY EXAM CHEST 1 VIEW: CPT

## 2022-08-07 PROCEDURE — 99291 CRITICAL CARE FIRST HOUR: CPT | Performed by: EMERGENCY MEDICINE

## 2022-08-07 PROCEDURE — 96374 THER/PROPH/DIAG INJ IV PUSH: CPT

## 2022-08-07 PROCEDURE — G1004 CDSM NDSC: HCPCS

## 2022-08-07 PROCEDURE — 80076 HEPATIC FUNCTION PANEL: CPT | Performed by: EMERGENCY MEDICINE

## 2022-08-07 PROCEDURE — 80179 DRUG ASSAY SALICYLATE: CPT | Performed by: EMERGENCY MEDICINE

## 2022-08-07 RX ORDER — SIMETHICONE 80 MG
80 TABLET,CHEWABLE ORAL 4 TIMES DAILY PRN
Status: DISCONTINUED | OUTPATIENT
Start: 2022-08-07 | End: 2022-08-15 | Stop reason: HOSPADM

## 2022-08-07 RX ORDER — MAGNESIUM HYDROXIDE/ALUMINUM HYDROXICE/SIMETHICONE 120; 1200; 1200 MG/30ML; MG/30ML; MG/30ML
30 SUSPENSION ORAL EVERY 6 HOURS PRN
Status: DISCONTINUED | OUTPATIENT
Start: 2022-08-07 | End: 2022-08-15 | Stop reason: HOSPADM

## 2022-08-07 RX ORDER — CEFTRIAXONE 1 G/50ML
1000 INJECTION, SOLUTION INTRAVENOUS EVERY 24 HOURS
Status: DISCONTINUED | OUTPATIENT
Start: 2022-08-07 | End: 2022-08-08

## 2022-08-07 RX ORDER — ONDANSETRON 2 MG/ML
4 INJECTION INTRAMUSCULAR; INTRAVENOUS EVERY 6 HOURS PRN
Status: DISCONTINUED | OUTPATIENT
Start: 2022-08-07 | End: 2022-08-15 | Stop reason: HOSPADM

## 2022-08-07 RX ORDER — METOPROLOL TARTRATE 5 MG/5ML
5 INJECTION INTRAVENOUS ONCE
Status: COMPLETED | OUTPATIENT
Start: 2022-08-07 | End: 2022-08-07

## 2022-08-07 RX ORDER — LEVETIRACETAM 500 MG/1
500 TABLET ORAL EVERY 12 HOURS SCHEDULED
Status: DISCONTINUED | OUTPATIENT
Start: 2022-08-08 | End: 2022-08-15 | Stop reason: HOSPADM

## 2022-08-07 RX ORDER — METOPROLOL SUCCINATE 25 MG/1
25 TABLET, EXTENDED RELEASE ORAL DAILY
Status: DISCONTINUED | OUTPATIENT
Start: 2022-08-08 | End: 2022-08-15 | Stop reason: HOSPADM

## 2022-08-07 RX ORDER — LANOLIN ALCOHOL/MO/W.PET/CERES
100 CREAM (GRAM) TOPICAL DAILY
Status: DISCONTINUED | OUTPATIENT
Start: 2022-08-08 | End: 2022-08-15 | Stop reason: HOSPADM

## 2022-08-07 RX ORDER — SODIUM CHLORIDE, SODIUM LACTATE, POTASSIUM CHLORIDE, CALCIUM CHLORIDE 600; 310; 30; 20 MG/100ML; MG/100ML; MG/100ML; MG/100ML
75 INJECTION, SOLUTION INTRAVENOUS CONTINUOUS
Status: DISCONTINUED | OUTPATIENT
Start: 2022-08-07 | End: 2022-08-10

## 2022-08-07 RX ORDER — ACETAMINOPHEN 325 MG/1
650 TABLET ORAL EVERY 6 HOURS PRN
Status: DISCONTINUED | OUTPATIENT
Start: 2022-08-07 | End: 2022-08-15 | Stop reason: HOSPADM

## 2022-08-07 RX ORDER — ATORVASTATIN CALCIUM 40 MG/1
40 TABLET, FILM COATED ORAL DAILY
Status: DISCONTINUED | OUTPATIENT
Start: 2022-08-08 | End: 2022-08-15 | Stop reason: HOSPADM

## 2022-08-07 RX ADMIN — SODIUM CHLORIDE 1000 ML: 9 INJECTION, SOLUTION INTRAVENOUS at 15:15

## 2022-08-07 RX ADMIN — METOROPROLOL TARTRATE 5 MG: 5 INJECTION, SOLUTION INTRAVENOUS at 15:17

## 2022-08-07 RX ADMIN — CEFTRIAXONE 1000 MG: 1 INJECTION, SOLUTION INTRAVENOUS at 18:52

## 2022-08-07 RX ADMIN — APIXABAN 5 MG: 5 TABLET, FILM COATED ORAL at 18:52

## 2022-08-07 RX ADMIN — LEVETIRACETAM 1000 MG: 100 INJECTION, SOLUTION INTRAVENOUS at 19:57

## 2022-08-07 RX ADMIN — SODIUM CHLORIDE, SODIUM LACTATE, POTASSIUM CHLORIDE, AND CALCIUM CHLORIDE 75 ML/HR: .6; .31; .03; .02 INJECTION, SOLUTION INTRAVENOUS at 20:52

## 2022-08-07 RX ADMIN — SODIUM CHLORIDE 1000 ML: 9 INJECTION, SOLUTION INTRAVENOUS at 14:36

## 2022-08-07 NOTE — ASSESSMENT & PLAN NOTE
· Patient came in with atrial fibrillation with rapid ventricular rate with a heart rate of 160, possibly precipitated by the seizure that she had  · Heart rate improved to between 90 and 122 per minute after 5 mg of IV metoprolol  · Continue anticoagulation with Eliquis and continue oral metoprolol    Blood pressure 113/79, pulse (!) 122, temperature 99 7 °F (37 6 °C), temperature source Oral, resp  rate 18, SpO2 94 %

## 2022-08-07 NOTE — ASSESSMENT & PLAN NOTE
· Patient likely had right lower lobe aspiration pneumonia subsequent to the seizure episode  · Will give intravenous ceftriaxone and fluids and monitor the patient    · Will follow-up on WBC and procalcitonin counts- to decide on continuation versus discontinuation of antibiotics

## 2022-08-07 NOTE — ED PROVIDER NOTES
History  Chief Complaint   Patient presents with    Atrial Fibrillation     Pt with hr of 150-160, a-fib, family observed seizure like activity at home   On arrival pt's BP 76/54       History provided by:  Patient   used: No    Seizure - New Onset  Seizure activity on arrival: no    Seizure type:  [address] mal  Preceding symptoms comment:  Unspecified  Initial focality: Reported generalized  Episode characteristics: generalized shaking    Postictal symptoms: confusion    Return to baseline: yes    Severity:  Moderate  Timing:  Once  Number of seizures this episode:  1  Progression:  Unchanged      Prior to Admission Medications   Prescriptions Last Dose Informant Patient Reported? Taking? apixaban (ELIQUIS) 5 mg  Self No Yes   Sig: Take 1 tablet (5 mg total) by mouth 2 (two) times a day   atorvastatin (LIPITOR) 40 mg tablet   No Yes   Sig: TAKE 1 TABLET BY MOUTH EVERY DAY   metoprolol succinate (Toprol XL) 25 mg 24 hr tablet  Self No Yes   Sig: Take 1 tablet (25 mg total) by mouth daily   sertraline (ZOLOFT) 50 mg tablet   No Yes   Sig: TAKE ONE TABLET BY MOUTH DAILY   thiamine 100 MG tablet  Self Yes Yes   Sig: Take 100 mg by mouth daily      Facility-Administered Medications: None       Past Medical History:   Diagnosis Date    Stroke [facility])        Past Surgical History:   Procedure Laterality Date    OVARY SURGERY         History reviewed  No pertinent family history  I have reviewed and agree with the history as documented  E-Cigarette/Vaping    E-Cigarette Use Never User      E-Cigarette/Vaping Substances    Nicotine No     THC No     CBD No     Flavoring No     Other No     Unknown No      Social History     Tobacco Use    Smoking status: Former Smoker    Smokeless tobacco: Never Used   Vaping Use    Vaping Use: Never used   Substance Use Topics    Alcohol use: Not Currently    Drug use: Not Currently       Review of Systems   Neurological: Positive for seizures     All other systems reviewed and are negative  Physical Exam  Physical Exam  Vitals and nursing note reviewed  Constitutional:       General: She is not in acute distress  Appearance: She is well-developed  HENT:      Head: Normocephalic and atraumatic  Eyes:      Conjunctiva/sclera: Conjunctivae normal    Cardiovascular:      Rate and Rhythm: Tachycardia present  Rhythm irregular  Heart sounds: No murmur heard  Pulmonary:      Effort: Pulmonary effort is normal  No respiratory distress  Breath sounds: Normal breath sounds  Abdominal:      Palpations: Abdomen is soft  Tenderness: There is no abdominal tenderness  Musculoskeletal:      Cervical back: Neck supple  Skin:     General: Skin is warm and dry  Capillary Refill: Capillary refill takes less than 2 seconds  Neurological:      General: No focal deficit present  Mental Status: She is alert and oriented to person, place, and time  Comments: Slurred speech/dysarthria, answers most questions appropriately with short answers  Right-sided upper extremity weakness which patient reports is baseline since her previous stroke  Normal left upper extremity strength in bilateral lower extremity strength           Vital Signs  ED Triage Vitals   Temperature Pulse Respirations Blood Pressure SpO2   08/07/22 1423 08/07/22 1425 08/07/22 1425 08/07/22 1425 08/07/22 1425   99 7 °F (37 6 °C) 104 22 (!) 76/54 92 %      Temp Source Heart Rate Source Patient Position - Orthostatic VS BP Location FiO2 (%)   08/07/22 1423 08/07/22 1436 08/07/22 1436 08/07/22 1436 --   Oral Monitor Lying Right arm       Pain Score       08/08/22 1127       No Pain           Vitals:    08/08/22 0941 08/08/22 1348 08/08/22 1500 08/08/22 1717   BP: 123/68 93/60 106/73 118/74   Pulse: (!) 52 70 63 62   Patient Position - Orthostatic VS: Lying Lying           Visual Acuity  Visual Acuity    Flowsheet Row Most Recent Value   L Pupil Size (mm) 5   R Pupil Size (mm) 5   L Pupil Shape Round   R Pupil Shape Round          ED Medications  Medications   metoprolol succinate (TOPROL-XL) 24 hr tablet 25 mg (25 mg Oral Not Given 8/8/22 1006)   apixaban (ELIQUIS) tablet 5 mg (5 mg Oral Given 8/8/22 1006)   thiamine tablet 100 mg (100 mg Oral Given 8/8/22 1006)   atorvastatin (LIPITOR) tablet 40 mg (40 mg Oral Given 8/8/22 1006)   sertraline (ZOLOFT) tablet 50 mg (50 mg Oral Given 8/8/22 1011)   acetaminophen (TYLENOL) tablet 650 mg (has no administration in time range)   magnesium hydroxide (MILK OF MAGNESIA) oral suspension 30 mL (has no administration in time range)   ondansetron (ZOFRAN) injection 4 mg (has no administration in time range)   simethicone (MYLICON) chewable tablet 80 mg (has no administration in time range)   aluminum-magnesium hydroxide-simethicone (MYLANTA) oral suspension 30 mL (has no administration in time range)   lactated ringers infusion (75 mL/hr Intravenous New Bag 8/7/22 2052)   levETIRAcetam (KEPPRA) tablet 500 mg (500 mg Oral Given 8/8/22 1006)   potassium chloride (K-DUR,KLOR-CON) CR tablet 40 mEq (40 mEq Oral Given 8/8/22 1006)   sodium chloride 0 9 % bolus 1,000 mL (0 mL Intravenous Stopped 8/7/22 1555)   metoprolol (LOPRESSOR) injection 5 mg (5 mg Intravenous Given 8/7/22 1517)   sodium chloride 0 9 % bolus 1,000 mL (0 mL Intravenous Stopped 8/7/22 1600)   levETIRAcetam (KEPPRA) 1,000 mg in sodium chloride 0 9 % 100 mL IVPB (0 mg Intravenous Stopped 8/7/22 2012)       Diagnostic Studies  Results Reviewed     Procedure Component Value Units Date/Time    CBC and differential [[id number]]  (Abnormal) Collected: 08/08/22 1954    Lab Status: Final result Specimen: Blood from Hand, Left Updated: 08/08/22 0634     WBC 5 63 Thousand/uL      RBC 3 34 Million/uL      Hemoglobin 10 4 g/dL      Hematocrit 32 1 %      MCV 96 fL      MCH 31 1 pg      MCHC 32 4 g/dL      RDW 12 6 %      MPV 12 3 fL      Platelets 98 Thousands/uL      nRBC 0 /100 WBCs Neutrophils Relative 74 %      Immat GRANS % 0 %      Lymphocytes Relative 18 %      Monocytes Relative 6 %      Eosinophils Relative 1 %      Basophils Relative 1 %      Neutrophils Absolute 4 13 Thousands/µL      Immature Grans Absolute 0 01 Thousand/uL      Lymphocytes Absolute 1 03 Thousands/µL      Monocytes Absolute 0 35 Thousand/µL      Eosinophils Absolute 0 07 Thousand/µL      Basophils Absolute 0 04 Thousands/µL     Comprehensive metabolic panel [[id number]]  (Abnormal) Collected: 08/08/22 0449    Lab Status: Final result Specimen: Blood from Hand, Left Updated: 08/08/22 0528     Sodium 141 mmol/L      Potassium 3 1 mmol/L      Chloride 109 mmol/L      CO2 24 mmol/L      ANION GAP 8 mmol/L      BUN 8 mg/dL      Creatinine 0 56 mg/dL      Glucose 93 mg/dL      Calcium 8 4 mg/dL      Corrected Calcium 9 4 mg/dL      AST 17 U/L      ALT 17 U/L      Alkaline Phosphatase 59 U/L      Total Protein 6 6 g/dL      Albumin 2 8 g/dL      Total Bilirubin 0 69 mg/dL      eGFR 101 ml/min/1 73sq m     Narrative:      [place] guidelines for Chronic Kidney Disease (CKD):     Stage 1 with normal or high GFR (GFR > 90 mL/min/1 73 square meters)    Stage 2 Mild CKD (GFR = 60-89 mL/min/1 73 square meters)    Stage 3A Moderate CKD (GFR = 45-59 mL/min/1 73 square meters)    Stage 3B Moderate CKD (GFR = 30-44 mL/min/1 73 square meters)    Stage 4 Severe CKD (GFR = 15-29 mL/min/1 73 square meters)    Stage 5 End Stage CKD (GFR <15 mL/min/1 73 square meters)  Note: GFR calculation is accurate only with a steady state creatinine    HS Troponin I 4hr [[id number]]  (Normal) Collected: 08/07/22 2218    Lab Status: Final result Specimen: Blood from Arm, Right Updated: 08/07/22 2247     hs TnI 4hr 9 ng/L      Delta 4hr hsTnI 5 ng/L     HS Troponin I 2hr [[id number]]  (Normal) Collected: 08/07/22 1646    Lab Status: Final result Specimen: Blood from Arm, Right Updated: 08/07/22 1726     hs TnI 2hr 8 ng/L Delta 2hr hsTnI 4 ng/L     Ethanol [[id number]]  (Normal) Collected: 08/07/22 1504    Lab Status: Final result Specimen: Blood from Arm, Right Updated: 08/07/22 1549     Ethanol Lvl <3 mg/dL     Salicylate level [[id number]]  (Abnormal) Collected: 08/07/22 1504    Lab Status: Final result Specimen: Blood from Arm, Right Updated: 60/27/07 8957     Salicylate Lvl <3 mg/dL     Acetaminophen level-If concentration is detectable, please discuss with medical  on call  [[id number]]  (Abnormal) Collected: 08/07/22 1504    Lab Status: Final result Specimen: Blood from Arm, Right Updated: 08/07/22 1536     Acetaminophen Level <2 0 ug/mL     CBC and differential [[id number]]  (Abnormal) Collected: 08/07/22 1431    Lab Status: Final result Specimen: Blood from Arm, Left Updated: 08/07/22 1528     WBC 8 54 Thousand/uL      RBC 4 11 Million/uL      Hemoglobin 12 9 g/dL      Hematocrit 39 3 %      MCV 96 fL      MCH 31 4 pg      MCHC 32 8 g/dL      RDW 12 3 %      MPV 12 4 fL      Platelets 560 Thousands/uL     Narrative: This is an appended report  These results have been appended to a previously verified report      Manual Differential(PHLEBS Do Not Order) [[id number]]  (Abnormal) Collected: 08/07/22 1431    Lab Status: Final result Specimen: Blood from Arm, Left Updated: 08/07/22 1528     Segmented % 86 %      Lymphocytes % 8 %      Monocytes % 4 %      Eosinophils, % 0 %      Basophils % 0 %      Myelocytes % 2 %      Absolute Neutrophils 7 34 Thousand/uL      Lymphocytes Absolute 0 68 Thousand/uL      Monocytes Absolute 0 34 Thousand/uL      Eosinophils Absolute 0 00 Thousand/uL      Basophils Absolute 0 00 Thousand/uL      Total Counted --     Hypochromia Present     Polychromasia Present     Platelet Estimate Borderline     Large Platelet Present    FLU/RSV/COVID - if FLU/RSV clinically relevant [[id number]]  (Normal) Collected: 08/07/22 1431    Lab Status: Final result Specimen: Nares from Nose Updated: 08/07/22 1526     SARS-CoV-2 Negative     INFLUENZA A PCR Negative     INFLUENZA B PCR Negative     RSV PCR Negative    Narrative:      FOR PEDIATRIC PATIENTS - copy/paste COVID Guidelines URL to browser: [email or web address]  [email or web address]    SARS-CoV-2 assay is a Nucleic Acid Amplification assay intended for the  qualitative detection of nucleic acid from SARS-CoV-2 in nasopharyngeal  swabs  Results are for the presumptive identification of SARS-CoV-2 RNA  Positive results are indicative of infection with SARS-CoV-2, the virus  causing COVID-19, but do not rule out bacterial infection or co-infection  with other viruses  Laboratories within the [place] and its  territories are required to report all positive results to the appropriate  public health authorities  Negative results do not preclude SARS-CoV-2  infection and should not be used as the sole basis for treatment or other  patient management decisions  Negative results must be combined with  clinical observations, patient history, and epidemiological information  This test has not been FDA cleared or approved  This test has been authorized by FDA under an Emergency Use Authorization  (EUA)  This test is only authorized for the duration of time the  declaration that circumstances exist justifying the authorization of the  emergency use of an in vitro diagnostic tests for detection of SARS-CoV-2  virus and/or diagnosis of COVID-19 infection under section 564(b)(1) of  the Act, 21 U  S C  521JAD-1(I)(4), unless the authorization is terminated  or revoked sooner  The test has been validated but independent review by FDA  and CLIA is pending  Test performed using [employer] GeneXpert: This RT-PCR assay targets N2,  a region unique to SARS-CoV-2  A conserved region in the E-gene was chosen  for pan-Sarbecovirus detection which includes SARS-CoV-2      Procalcitonin [[id number]]  (Abnormal) Collected: 08/07/22 1431    Lab Status: Final result Specimen: Blood from Arm, Left Updated: 08/07/22 1515     Procalcitonin 0 81 ng/ml     Hepatic function panel [[id number]]  (Abnormal) Collected: 08/07/22 1431    Lab Status: Final result Specimen: Blood from Arm, Left Updated: 08/07/22 1515     Total Bilirubin 1 27 mg/dL      Bilirubin, Direct 0 41 mg/dL      Alkaline Phosphatase 82 U/L      AST 17 U/L      ALT 19 U/L      Total Protein 8 0 g/dL      Albumin 3 8 g/dL     NT-BNP PRO [[id number]]  (Abnormal) Collected: 08/07/22 1431    Lab Status: Final result Specimen: Blood from Arm, Left Updated: 08/07/22 1515     NT-proBNP 311 pg/mL     HS Troponin 0hr (reflex protocol) [[id number]]  (Normal) Collected: 08/07/22 1431    Lab Status: Final result Specimen: Blood from Arm, Left Updated: 08/07/22 1513     hs TnI 0hr 4 ng/L     Lactic acid [[id number]]  (Normal) Collected: 08/07/22 1431    Lab Status: Final result Specimen: Blood from Arm, Left Updated: 08/07/22 1510     LACTIC ACID 1 9 mmol/L     Narrative:      Result may be elevated if tourniquet was used during collection      Basic metabolic panel [[id number]]  (Abnormal) Collected: 08/07/22 1431    Lab Status: Final result Specimen: Blood from Arm, Left Updated: 08/07/22 1507     Sodium 138 mmol/L      Potassium 3 7 mmol/L      Chloride 101 mmol/L      CO2 25 mmol/L      ANION GAP 12 mmol/L      BUN 11 mg/dL      Creatinine 0 78 mg/dL      Glucose 143 mg/dL      Calcium 9 1 mg/dL      eGFR 82 ml/min/1 73sq m     Narrative:      [place] guidelines for Chronic Kidney Disease (CKD):     Stage 1 with normal or high GFR (GFR > 90 mL/min/1 73 square meters)    Stage 2 Mild CKD (GFR = 60-89 mL/min/1 73 square meters)    Stage 3A Moderate CKD (GFR = 45-59 mL/min/1 73 square meters)    Stage 3B Moderate CKD (GFR = 30-44 mL/min/1 73 square meters)    Stage 4 Severe CKD (GFR = 15-29 mL/min/1 73 square meters)    Stage 5 End Stage CKD (GFR <15 mL/min/1 73 square meters)  Note: GFR calculation is accurate only with a steady state creatinine    UA (URINE) with reflex to Scope [[id number]]     Lab Status: No result Specimen: Urine     Fingerstick Glucose (POCT) [[id number]]  (Abnormal) Collected: 08/07/22 1425    Lab Status: Final result Updated: 08/07/22 1426     POC Glucose 141 mg/dl                  XR chest 1 view portable   Final Result by Av Santamaria MD (08/08 0533)      No acute cardiopulmonary disease  Workstation performed: [id number]         CT head without contrast   Final Result by Quincy Burgos MD (08/07 1502)      No acute intracranial abnormality  Chronic large infarct in left MCA territory with mild chronic microangiopathy                    Workstation performed: [id number]         MRI inpatient order    (Results Pending)              Procedures  CriticalCare Time  Performed by: Robert Feliciano MD  Authorized by: Robert Feliciano MD     Critical care provider statement:     Critical care time (minutes):  62    Critical care time was exclusive of:  Separately billable procedures and treating other patients and teaching time    Critical care was necessary to treat or prevent imminent or life-threatening deterioration of the following conditions:  Circulatory failure and CNS failure or compromise    Critical care was time spent personally by me on the following activities:  Blood draw for specimens, obtaining history from patient or surrogate, development of treatment plan with patient or surrogate, evaluation of patient's response to treatment, discussions with consultants, examination of patient, ordering and performing treatments and interventions, ordering and review of laboratory studies, ordering and review of radiographic studies, re-evaluation of patient's condition and review of old charts             ED Course                                             MDM  Number of Diagnoses or Management Options  Atrial fibrillation with rapid ventricular response ([facility] [address] ): new and requires workup  Hypotension: new and requires workup  New onset seizure [facility]): new and requires workup  Diagnosis management comments: 29-year-old female presented for evaluation after an apparent seizure  The patient has a history of a left-sided MCA to territory stroke in October of 2021  She was initially treated at [facility], discharged to rehab and has since been home for several months  She was also diagnosed with atrial fibrillation at that time  She takes Eliquis  Today she was at home with family when she apparently collapsed on the couch and had an episode of generalized shaking  EMS reports that by the time of their arrival the shaking episode had stopped  The patient was somnolent and confused  She did not lose control of her bladder or bite her tongue  On presentation to the emergency department she is found to be in rapid atrial fibrillation  She appeared to be back at her mental baseline which includes dysarthria and right-sided weakness  On my initial evaluation the patient could not tell me much about the events of today, stating she did not remember what happened  She does report that she has not had much to eat or drink today  She denies any history of seizures  On her initial presentation she was in rapid atrial fibrillation with rates as high as 150 and somewhat hypotensive with blood pressures 70s/40s  Hypotension improved with IV fluids, and his rate improved somewhat with intravenous metoprolol  CT head was negative for acute pathology  Other laboratory studies relatively unremarkable  Will plan to admit for rapid atrial fibrillation and new onset seizure         Amount and/or Complexity of Data Reviewed  Clinical lab tests: reviewed and ordered  Tests in the radiology section of CPT®: reviewed and ordered  Review and summarize past medical records: yes  Discuss the patient with other providers: yes  Independent visualization of images, tracings, or specimens: yes        Disposition  Final diagnoses:   Atrial fibrillation with rapid ventricular response ([address] )   New onset seizure ([facility] [address] )     Time reflects when diagnosis was documented in both MDM as applicable and the Disposition within this note     Time User Action Codes Description Comment    8/7/2022  3:35 PM Lisbeth Henry Add [I48 [id number]] Atrial fibrillation with rapid ventricular response ([facility] [address] )     8/7/2022  3:35 PM Lisbeth Henry Add [R56 [id number]] New onset seizure ([facility] [address] )     8/7/2022  6:11 PM Freddie Iraheta Add [I69 [id number]] 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, unspecified hemiplegia type [facility])       ED Disposition     ED Disposition   Admit    Condition   Stable    Date/Time   Sun Aug 7, 2022  3:35 PM    Comment   Case was discussed with FERNANDO and the patient's admission status was agreed to be Admission Status: inpatient status to the service of Dr Licha Armijo  Follow-up Information     Follow up With Specialties Details Why Contact Info Additional Information    Neurology [facility] Follow up in 4 week(s) Please follow-up with outpatient epilepsy attending or advanced practitioner in 4-6 weeks   Contact the number provided above to schedule an appointment [facility]  [phone] Neurology [address], [facility],[address] [facility]          Current Discharge Medication List      CONTINUE these medications which have NOT CHANGED    Details   apixaban (ELIQUIS) 5 mg Take 1 tablet (5 mg total) by mouth 2 (two) times a day  Qty: 60 tablet, Refills: 11    Associated Diagnoses: Paroxysmal atrial fibrillation (HCC)      atorvastatin (LIPITOR) 40 mg tablet TAKE 1 TABLET BY MOUTH EVERY DAY  Qty: 90 tablet, Refills: 1    Associated Diagnoses: Other hyperlipidemia      metoprolol succinate (Toprol XL) 25 mg 24 hr tablet Take 1 tablet (25 mg total) by mouth daily  Qty: 90 tablet, Refills: 3    Associated Diagnoses: Paroxysmal atrial fibrillation (HCC)      sertraline (ZOLOFT) 50 mg tablet TAKE ONE TABLET BY MOUTH DAILY  Qty: 90 tablet, Refills: 1    Associated Diagnoses: Depression, unspecified depression type      thiamine 100 MG tablet Take 100 mg by mouth daily             No discharge procedures on file      PDMP Review       Value Time User    PDMP Reviewed  Yes 8/7/2022  6:10 PM Kathya Grant MD          ED Provider  Electronically Signed by           Rohini Honeycutt MD  08/08/22 7254

## 2022-08-07 NOTE — H&P
[address] [facility]  H&P- Kendra Jaci 1960, 63 y o  female MRN: [id number]  Unit/Bed#: ED 15 Encounter: [id number]  Primary Care Provider: No primary care provider on file  Date and time admitted to hospital: 8/7/2022  2:23 PM    * New onset seizure [facility])  Assessment & Plan  · Patient came in with witnessed seizure at home  Family so the patient's lumbar with the collagen have total body shaking and called EMS  · At the time the EMS arrived the patient started to wake up but was initially confused and somnolent  Slowly she recovered to baseline by the time that she came back to the emergency room  · Patient does have past medical history CVA with right-sided weakness and some expressive aphasia  Patient is able to communicate to me in small sentences  She does not remember what happened at the time of the seizures  · Patient was not found to be hypoglycemic or did not have any exposure to any toxins prior to the seizure episode  She has not had any seizure episodes in the past  · CT head was normal   Will get Neurology consult and ordered EEG  Will start the patient on Keppra in the interim    Atrial fibrillation with RVR ([facility] [address] )  Assessment & Plan  · Patient came in with atrial fibrillation with rapid ventricular rate with a heart rate of 160, possibly precipitated by the seizure that she had  · Heart rate improved to between 90 and 122 per minute after 5 mg of IV metoprolol  · Continue anticoagulation with Eliquis and continue oral metoprolol    Blood pressure 113/79, pulse (!) 122, temperature 99 7 °F (37 6 °C), temperature source Oral, resp  rate 18, SpO2 94 %  Aspiration pneumonia [facility])  Assessment & Plan  · Patient likely had right lower lobe aspiration pneumonia subsequent to the seizure episode  · Will give antibiotics and fluids and monitor the patient    · Will follow-up on WBC and procalcitonin counts- to decide on continuation of antibiotics    Mixed hyperlipidemia  Assessment & Plan  · Continue Lipitor    Hemiplegia (HCC)  Assessment & Plan  · This is chronic and there is no new weakness  · Physical and occupational therapy to continue      VTE Prophylaxis: Apixaban (Eliquis)  Code Status: Level 1 - Full Code  Anticipated Length of Stay:  Patient will be admitted on an Inpatient basis with an anticipated length of stay of  more than 2 midnights  Justification for Hospital Stay: New onset seizure [facility])      Chief Complaint:     Chief Complaint   Patient presents with    Atrial Fibrillation     Pt with hr of 150-160, a-fib, family observed seizure like activity at home   On arrival pt's BP 76/54     History of Present Illness:    Ac Grewal is a 63 y o  female who presents with an episode where she was found slumped over in the house and the whole body was shaking  This was witnessed by the family  They called EMS who arrived and at that time the patient was still drowsy  She started to wake up but was confused and somnolent  They brought her to the emergency room and during that time the patient sensorium improved  Patient does have mild expressive aphasia and right upper extremity weakness from prior hemiplegia  At the time they saw the patient the patient did have a rapid heart rate of 160 per minute  Patient does not remember any of these episodes  Patient has no history of prior seizures  No new weakness  Patient was also found to be hypotensive with a blood pressure in the 60s when she came in  Patient was given intravenous fluids and intravenous metoprolol and improved  Patient's appetite has been low for the last few days  No high fever or chills  No cough or dysuria at this point of time      Review of Systems:  Negative except as above  History obtained from the patient  General ROS: positive for  - fatigue  Psychological ROS: negative  Ophthalmic ROS: negative  Respiratory ROS: no cough, shortness of breath, or wheezing  Cardiovascular ROS: no chest pain or dyspnea on exertion  Gastrointestinal ROS: no abdominal pain, change in bowel habits, or black or bloody stools  Genito-Urinary ROS: no dysuria, trouble voiding, or hematuria  Musculoskeletal ROS: positive for - muscular weakness on right side of body  Neurological ROS: positive for - seizures and right-sided hemiplegia  Hematological ROS- No active bleeding  Otherwise, all other 12 point review of systems normal         Past Medical and Surgical History:   Past Medical History:   Diagnosis Date    Stroke [facility])      Past Surgical History:   Procedure Laterality Date    OVARY SURGERY       Meds/Allergies: Allergies: No Known Allergies  Prior to Admission Medications   Prescriptions Last Dose Informant Patient Reported? Taking?    apixaban (ELIQUIS) 5 mg  Self No No   Sig: Take 1 tablet (5 mg total) by mouth 2 (two) times a day   atorvastatin (LIPITOR) 40 mg tablet   No No   Sig: TAKE 1 TABLET BY MOUTH EVERY DAY   metoprolol succinate (Toprol XL) 25 mg 24 hr tablet  Self No No   Sig: Take 1 tablet (25 mg total) by mouth daily   sertraline (ZOLOFT) 50 mg tablet   No No   Sig: TAKE ONE TABLET BY MOUTH DAILY   thiamine 100 MG tablet  Self Yes No   Sig: Take 100 mg by mouth daily      Facility-Administered Medications: None     Social History:     Social History     Socioeconomic History    Marital status: Single     Spouse name: Not on file    Number of children: Not on file    Years of education: Not on file    Highest education level: Not on file   Occupational History    Not on file   Tobacco Use    Smoking status: Former Smoker    Smokeless tobacco: Never Used   Vaping Use    Vaping Use: Never used   Substance and Sexual Activity    Alcohol use: Not Currently    Drug use: Not Currently    Sexual activity: Not on file   Other Topics Concern    Not on file   Social History Narrative    Not on file     Social Determinants of Health     Financial Resource Strain: Not on file   Food Insecurity: Not on file   Transportation Needs: Not on file   Physical Activity: Not on file   Stress: Not on file   Social Connections: Not on file   Intimate Partner Violence: Not on file   Housing Stability: Not on file     Patient Pre-hospital Living Situation:  Lives at home  Patient Pre-hospital Level of Mobility:  Good mobility  Patient Pre-hospital Diet Restrictions:  No restrictions    Family History:  History reviewed  No pertinent family history  Physical Exam:   Vitals:   Blood Pressure: 113/79 (08/07/22 1645)  Pulse: (!) 122 (08/07/22 1645)  Temperature: 99 7 °F (37 6 °C) (08/07/22 1423)  Temp Source: Oral (08/07/22 1423)  Respirations: 18 (08/07/22 1645)  SpO2: 94 % (08/07/22 1645)    General appearance: alert, appears stated age and cooperative  Constitutional- looks a little weak  HEENT - atraumatic and normocephalic  Neck- supple  Skin - no fresh rash  Extremities no fresh focal deformities  Cardiovascular- S1-S2 heard  Respiratory- bilateral air entry present, no crackles or rhonchi  Skin - no fresh rash  Abdomen - normal bowel sounds present, no rebound tenderness  CNS-  right hemiplegia with expressive aphasia  Psych- no acute psychosis     Lab Results: I have personally reviewed pertinent reports      Results from last 7 days   Lab Units 08/07/22  1431   WBC Thousand/uL 8 54   HEMOGLOBIN g/dL 12 9   HEMATOCRIT % 39 3   PLATELETS Thousands/uL 129*   LYMPHO PCT % 8*   MONO PCT % 4   EOS PCT % 0     Results from last 7 days   Lab Units 08/07/22  1431   SODIUM mmol/L 138   POTASSIUM mmol/L 3 7   CHLORIDE mmol/L 101   CO2 mmol/L 25   ANION GAP mmol/L 12   BUN mg/dL 11   CREATININE mg/dL 0 78   CALCIUM mg/dL 9 1   ALBUMIN g/dL 3 8   TOTAL BILIRUBIN mg/dL 1 27*   ALK PHOS U/L 82   ALT U/L 19   AST U/L 17   EGFR ml/min/1 73sq m 82   GLUCOSE RANDOM mg/dL 143*             Results from last 7 days   Lab Units 08/07/22  1431   LACTIC ACID mmol/L 1 9         Results from last 7 days   Lab Units 08/07/22  1431   NT-PRO BNP pg/mL 311*                Results from last 7 days   Lab Units 08/07/22  1431   INFLUENZA A PCR  Negative   INFLUENZA B PCR  Negative   RSV PCR  Negative     Imaging: I have personally reviewed pertinent reports  CT head without contrast    Result Date: 8/7/2022  Impression: No acute intracranial abnormality  Chronic large infarct in left MCA territory with mild chronic microangiopathy  Workstation performed: [id number]       EKG, Pathology, and Other Studies Reviewed on Admission:   EKG  Result Date: 08/07/22  Personally reviewed strips with impression of:  No acute ST or T-wave changes    Epic Records Reviewed: Yes    MD Luis Fernando Ornelas [facility] Internal Medicine    ** Please Note: This note has been constructed using a voice recognition system   **

## 2022-08-07 NOTE — ED NOTES
Assumed care of patient at this time - patient now returned from CT scan       Debbie Wheeler RN  08/07/22 9279

## 2022-08-07 NOTE — ASSESSMENT & PLAN NOTE
· Patient came in with witnessed seizure at home  Family so the patient's lumbar with the collagen have total body shaking and called EMS  · At the time the EMS arrived the patient started to wake up but was initially confused and somnolent  Slowly she recovered to baseline by the time that she came back to the emergency room  · Patient does have past medical history CVA with right-sided weakness and some expressive aphasia  Patient is able to communicate to me in small sentences  She does not remember what happened at the time of the seizures  · Patient was not found to be hypoglycemic or did not have any exposure to any toxins prior to the seizure episode  She has not had any seizure episodes in the past  · CT head was normal   Will get Neurology consult and ordered EEG    Will start the patient on Keppra in the interim

## 2022-08-08 ENCOUNTER — APPOINTMENT (INPATIENT)
Dept: NEUROLOGY | Facility: HOSPITAL | Age: 62
DRG: 058 | End: 2022-08-08
Attending: INTERNAL MEDICINE
Payer: COMMERCIAL

## 2022-08-08 PROBLEM — D69.6 THROMBOCYTOPENIA (HCC): Status: ACTIVE | Noted: 2022-08-08

## 2022-08-08 LAB
ALBUMIN SERPL BCP-MCNC: 2.8 G/DL (ref 3.5–5)
ALP SERPL-CCNC: 59 U/L (ref 46–116)
ALT SERPL W P-5'-P-CCNC: 17 U/L (ref 12–78)
ANION GAP SERPL CALCULATED.3IONS-SCNC: 8 MMOL/L (ref 4–13)
AST SERPL W P-5'-P-CCNC: 17 U/L (ref 5–45)
ATRIAL RATE: 102 BPM
ATRIAL RATE: 263 BPM
BASOPHILS # BLD AUTO: 0.04 THOUSANDS/ΜL (ref 0–0.1)
BASOPHILS NFR BLD AUTO: 1 % (ref 0–1)
BILIRUB SERPL-MCNC: 0.69 MG/DL (ref 0.2–1)
BUN SERPL-MCNC: 8 MG/DL (ref 5–25)
CALCIUM ALBUM COR SERPL-MCNC: 9.4 MG/DL (ref 8.3–10.1)
CALCIUM SERPL-MCNC: 8.4 MG/DL (ref 8.3–10.1)
CHLORIDE SERPL-SCNC: 109 MMOL/L (ref 96–108)
CO2 SERPL-SCNC: 24 MMOL/L (ref 21–32)
CREAT SERPL-MCNC: 0.56 MG/DL (ref 0.6–1.3)
EOSINOPHIL # BLD AUTO: 0.07 THOUSAND/ΜL (ref 0–0.61)
EOSINOPHIL NFR BLD AUTO: 1 % (ref 0–6)
ERYTHROCYTE [DISTWIDTH] IN BLOOD BY AUTOMATED COUNT: 12.6 % (ref 11.6–15.1)
GFR SERPL CREATININE-BSD FRML MDRD: 101 ML/MIN/1.73SQ M
GLUCOSE SERPL-MCNC: 93 MG/DL (ref 65–140)
HCT VFR BLD AUTO: 32.1 % (ref 34.8–46.1)
HGB BLD-MCNC: 10.4 G/DL (ref 11.5–15.4)
IMM GRANULOCYTES # BLD AUTO: 0.01 THOUSAND/UL (ref 0–0.2)
IMM GRANULOCYTES NFR BLD AUTO: 0 % (ref 0–2)
LYMPHOCYTES # BLD AUTO: 1.03 THOUSANDS/ΜL (ref 0.6–4.47)
LYMPHOCYTES NFR BLD AUTO: 18 % (ref 14–44)
MCH RBC QN AUTO: 31.1 PG (ref 26.8–34.3)
MCHC RBC AUTO-ENTMCNC: 32.4 G/DL (ref 31.4–37.4)
MCV RBC AUTO: 96 FL (ref 82–98)
MONOCYTES # BLD AUTO: 0.35 THOUSAND/ΜL (ref 0.17–1.22)
MONOCYTES NFR BLD AUTO: 6 % (ref 4–12)
NEUTROPHILS # BLD AUTO: 4.13 THOUSANDS/ΜL (ref 1.85–7.62)
NEUTS SEG NFR BLD AUTO: 74 % (ref 43–75)
NRBC BLD AUTO-RTO: 0 /100 WBCS
PLATELET # BLD AUTO: 98 THOUSANDS/UL (ref 149–390)
PMV BLD AUTO: 12.3 FL (ref 8.9–12.7)
POTASSIUM SERPL-SCNC: 3.1 MMOL/L (ref 3.5–5.3)
PROT SERPL-MCNC: 6.6 G/DL (ref 6.4–8.4)
QRS AXIS: -13 DEGREES
QRS AXIS: 27 DEGREES
QRSD INTERVAL: 114 MS
QRSD INTERVAL: 116 MS
QT INTERVAL: 336 MS
QT INTERVAL: 414 MS
QTC INTERVAL: 538 MS
QTC INTERVAL: 589 MS
RBC # BLD AUTO: 3.34 MILLION/UL (ref 3.81–5.12)
SODIUM SERPL-SCNC: 141 MMOL/L (ref 135–147)
T WAVE AXIS: 135 DEGREES
T WAVE AXIS: 42 DEGREES
VENTRICULAR RATE: 122 BPM
VENTRICULAR RATE: 154 BPM
WBC # BLD AUTO: 5.63 THOUSAND/UL (ref 4.31–10.16)

## 2022-08-08 PROCEDURE — 95816 EEG AWAKE AND DROWSY: CPT

## 2022-08-08 PROCEDURE — 97163 PT EVAL HIGH COMPLEX 45 MIN: CPT

## 2022-08-08 PROCEDURE — 99255 IP/OBS CONSLTJ NEW/EST HI 80: CPT | Performed by: PSYCHIATRY & NEUROLOGY

## 2022-08-08 PROCEDURE — 80053 COMPREHEN METABOLIC PANEL: CPT | Performed by: INTERNAL MEDICINE

## 2022-08-08 PROCEDURE — 85025 COMPLETE CBC W/AUTO DIFF WBC: CPT | Performed by: INTERNAL MEDICINE

## 2022-08-08 PROCEDURE — 95816 EEG AWAKE AND DROWSY: CPT | Performed by: PSYCHIATRY & NEUROLOGY

## 2022-08-08 PROCEDURE — 36415 COLL VENOUS BLD VENIPUNCTURE: CPT | Performed by: INTERNAL MEDICINE

## 2022-08-08 PROCEDURE — 93010 ELECTROCARDIOGRAM REPORT: CPT | Performed by: INTERNAL MEDICINE

## 2022-08-08 PROCEDURE — 99232 SBSQ HOSP IP/OBS MODERATE 35: CPT | Performed by: PHYSICIAN ASSISTANT

## 2022-08-08 RX ORDER — POTASSIUM CHLORIDE 20 MEQ/1
40 TABLET, EXTENDED RELEASE ORAL 2 TIMES DAILY
Status: COMPLETED | OUTPATIENT
Start: 2022-08-08 | End: 2022-08-08

## 2022-08-08 RX ADMIN — LEVETIRACETAM 500 MG: 500 TABLET, FILM COATED ORAL at 21:48

## 2022-08-08 RX ADMIN — ATORVASTATIN CALCIUM 40 MG: 40 TABLET, FILM COATED ORAL at 10:06

## 2022-08-08 RX ADMIN — POTASSIUM CHLORIDE 40 MEQ: 1500 TABLET, EXTENDED RELEASE ORAL at 17:52

## 2022-08-08 RX ADMIN — LEVETIRACETAM 500 MG: 500 TABLET, FILM COATED ORAL at 10:06

## 2022-08-08 RX ADMIN — APIXABAN 5 MG: 5 TABLET, FILM COATED ORAL at 17:52

## 2022-08-08 RX ADMIN — SERTRALINE HYDROCHLORIDE 50 MG: 50 TABLET ORAL at 10:11

## 2022-08-08 RX ADMIN — APIXABAN 5 MG: 5 TABLET, FILM COATED ORAL at 10:06

## 2022-08-08 RX ADMIN — POTASSIUM CHLORIDE 40 MEQ: 1500 TABLET, EXTENDED RELEASE ORAL at 10:06

## 2022-08-08 RX ADMIN — THIAMINE HCL TAB 100 MG 100 MG: 100 TAB at 10:06

## 2022-08-08 NOTE — UTILIZATION REVIEW
Initial Clinical Review    Admission: Date/Time/Statement:   Admission Orders (From admission, onward)     Ordered        08/07/22 1535  INPATIENT ADMISSION  Once                      Orders Placed This Encounter   Procedures    INPATIENT ADMISSION     Standing Status:   Standing     Number of Occurrences:   1     Order Specific Question:   Level of Care     Answer:   Med Surg [16]     Order Specific Question:   Estimated length of stay     Answer:   More than 2 Midnights     Order Specific Question:   Certification     Answer:   I certify that inpatient services are medically necessary for this patient for a duration of greater than two midnights  See H&P and MD Progress Notes for additional information about the patient's course of treatment  ED Arrival Information     Expected   -    Arrival   8/7/2022 14:23    Acuity   Emergent            Means of arrival   Ambulance    Escorted by   [facility]    Service   Hospitalist    Admission type   Emergency            Arrival complaint   -           Chief Complaint   Patient presents with    Atrial Fibrillation     Pt with hr of 150-160, a-fib, family observed seizure like activity at home   On arrival pt's BP 76/54       Initial Presentation: 63 y o  female ADMITTED INPATIENT with NEW ONSET SEIZURE  Pt with PMHx of previous CVA with hemiplegia, HLD presents to ED by EMS after found slumped over at home with whole body shaking which was witnessed by family  On EMS arrival pt was awake but drowsy  She started to wake up but was confused and somnolent  In ED pt mental status started to improve, does have mild expressive aphasia and RUE weakness from previous hemiplegia  Tachycardic and hypotensive on presentation  Given IVFs and IV metoprolol with improvement  Pt states her appetite has been poor for past few days  CTH with no acute abnl  Pt likely had RLL aspiration pneumonia subsequent to the seizure episode  Plan: neuro checks q4h  Keppra started in ED  Neuro consulted  Tele monitoring  Continue anticoagulation with Eliquis and continue oral metoprolol, lipitor  Procal is elevated x1, IV abx  Date: 8/8   Day 2: no acute issues overnight  States she is feeling better, no confusion or HA today  Plts decreased from 129 -> 98  Monitor daily CBC, no s/sx of bleeding or abnl bruising  Etiology unclear  Neurology consulted, EEG pending  Concern for aspiration subsequent to the seizure episode  Given ceftriaxone and fluids, procal is elevated x1, repeat pending  Afebrile, no leukocytosis, CXR negative  K 3 1,   Neuro consult 8/8 -- New onset witnessed seizure, described as whole body shaking followed by somnolence/confusion  S/p IV Keppra load 1000 mg x 1 in the ED  Etiology remains unclear, however pt did have a fairly large left MCA territory infarct in October 2021, increasing the risk of developing seizures in the future  Will continue toxic metabolic workup and obtain MRI brain to rule out acute infarct    Plan:  Routine EEG pending,  MRI brain pending, UA Pending  Okay to continue Eliquis 5 mg BID at this time  Continue atorvastatin 40 mg daily  S/p IV Keppra load 1000 mg x 1  Started on Keppra 500 mg Q12H  Seizure precautions  Frequent neuro check   PT/OT/speech        ED Triage Vitals   Temperature Pulse Respirations Blood Pressure SpO2   08/07/22 1423 08/07/22 1425 08/07/22 1425 08/07/22 1425 08/07/22 1425   99 7 °F (37 6 °C) 104 22 (!) 76/54 92 %      Temp Source Heart Rate Source Patient Position - Orthostatic VS BP Location FiO2 (%)   08/07/22 1423 08/07/22 1436 08/07/22 1436 08/07/22 1436 --   Oral Monitor Lying Right arm       Pain Score       08/08/22 1127       No Pain          Wt Readings from Last 1 Encounters:   04/20/22 63 5 kg (140 lb)     Additional Vital Signs:   Date/Time Temp Pulse Resp BP MAP (mmHg) SpO2 O2 Device Patient Position - Orthostatic VS   08/08/22 1348 -- 70 14 93/60 -- 95 % None (Room air) Lying   08/08/22 0941 -- 52 Abnormal  20 123/68 -- 95 % None (Room air) Lying   08/08/22 0700 -- 54 Abnormal  20 115/63 83 94 % None (Room air) Lying   08/08/22 0000 -- 68 20 124/69 88 92 % None (Room air) --   08/07/22 2330 -- 72 22 89/56 Abnormal  68 94 % None (Room air) --   08/07/22 1645 -- 122 Abnormal  18 113/79 93 94 % None (Room air) Lying   08/07/22 1630 -- 109 Abnormal  18 97/69 79 92 % None (Room air) Lying   08/07/22 1600 -- 109 Abnormal  18 101/74 85 94 % None (Room air) Lying   08/07/22 1545 -- 129 Abnormal  18 117/80 94 96 % None (Room air) Lying   08/07/22 1515 -- 129 Abnormal  16 82/63 Abnormal  68 94 % None (Room air) Lying   08/07/22 1504 -- 139 Abnormal  16 83/65 Abnormal  -- 94 % None (Room air) Lying   08/07/22 1436 -- 120 Abnormal  14 101/56 -- 95 % None (Room air) Lying   08/07/22 1430 -- 156 Abnormal  8 Abnormal  90/56 64 Abnormal  94 % -- --   08/07/22 1425 -- 104 22 76/54 Abnormal  59 Abnormal  92 % -- --   08/07/22 1423 99 7 °F (37 6 °C) -- -- -- -- -- -- --       Pertinent Labs/Diagnostic Test Results:   EEG 8/8:   Interpretation: This is an abnormal 36 minutes awake and drowsy EEG due to continuous delta and theta activity over the left frontal region and intermittent delta activity over the left hemisphere  These findings indicate the presence of a structural lesion causing focal cerebral dysfunction over the left hemisphere, maximal over the left frontal region    The lack of epileptiform discharges on a routine EEG does not preclude the diagnosis of seizures or epilepsy  XR chest 1 view portable   Final Result by Clint Meza MD (08/08 0933)      No acute cardiopulmonary disease  CT head without contrast   Final Result by Aman Ignacio MD (08/07 2805)      No acute intracranial abnormality  Chronic large infarct in left MCA territory with mild chronic microangiopathy             Results from last 7 days   Lab Units 08/07/22  1431   SARS-COV-2  Negative     Results from last 7 days   Lab Units 08/08/22  0449 08/07/22  1431   WBC Thousand/uL 5 63 8 54   HEMOGLOBIN g/dL 10 4* 12 9   HEMATOCRIT % 32 1* 39 3   PLATELETS Thousands/uL 98* 129*   NEUTROS ABS Thousands/µL 4 13  --      Results from last 7 days   Lab Units 08/08/22  0449 08/07/22  1431   SODIUM mmol/L 141 138   POTASSIUM mmol/L 3 1* 3 7   CHLORIDE mmol/L 109* 101   CO2 mmol/L 24 25   ANION GAP mmol/L 8 12   BUN mg/dL 8 11   CREATININE mg/dL 0 56* 0 78   EGFR ml/min/1 73sq m 101 82   CALCIUM mg/dL 8 4 9 1     Results from last 7 days   Lab Units 08/08/22  0449 08/07/22  1431   AST U/L 17 17   ALT U/L 17 19   ALK PHOS U/L 59 82   TOTAL PROTEIN g/dL 6 6 8 0   ALBUMIN g/dL 2 8* 3 8   TOTAL BILIRUBIN mg/dL 0 69 1 27*   BILIRUBIN DIRECT mg/dL  --  0 41*     Results from last 7 days   Lab Units 08/07/22  1425   POC GLUCOSE mg/dl 141*     Results from last 7 days   Lab Units 08/08/22  0449 08/07/22  1431   GLUCOSE RANDOM mg/dL 93 143*     Results from last 7 days   Lab Units 08/07/22  2218 08/07/22  1646 08/07/22  1431   HS TNI 0HR ng/L  --   --  4   HS TNI 2HR ng/L  --  8  --    HSTNI D2 ng/L  --  4  --    HS TNI 4HR ng/L 9  --   --    HSTNI D4 ng/L 5  --   --      Results from last 7 days   Lab Units 08/07/22  1431   PROCALCITONIN ng/ml 0 81*     Results from last 7 days   Lab Units 08/07/22  1431   LACTIC ACID mmol/L 1 9     Results from last 7 days   Lab Units 08/07/22  1431   NT-PRO BNP pg/mL 311*     Results from last 7 days   Lab Units 08/07/22  1431   INFLUENZA A PCR  Negative   INFLUENZA B PCR  Negative   RSV PCR  Negative     Results from last 7 days   Lab Units 08/07/22  1504   ETHANOL LVL mg/dL <3   ACETAMINOPHEN LVL ug/mL <9 8*   SALICYLATE LVL mg/dL <3*       ED Treatment:   Medication Administration from 08/07/2022 1423 to 08/08/2022 1343       Date/Time Order Dose Route Action     08/07/2022 1436 sodium chloride 0 9 % bolus 1,000 mL 1,000 mL Intravenous New Bag     08/07/2022 1517 metoprolol (LOPRESSOR) injection 5 mg 5 mg Intravenous Given     08/07/2022 1515 sodium chloride 0 9 % bolus 1,000 mL 1,000 mL Intravenous New Bag     08/08/2022 1006 apixaban (ELIQUIS) tablet 5 mg 5 mg Oral Given     08/07/2022 1852 apixaban (ELIQUIS) tablet 5 mg 5 mg Oral Given     08/08/2022 1006 thiamine tablet 100 mg 100 mg Oral Given     08/08/2022 1006 atorvastatin (LIPITOR) tablet 40 mg 40 mg Oral Given     08/08/2022 1011 sertraline (ZOLOFT) tablet 50 mg 50 mg Oral Given     08/07/2022 2052 lactated ringers infusion 75 mL/hr Intravenous New Bag     08/07/2022 1852 cefTRIAXone (ROCEPHIN) IVPB (premix in dextrose) 1,000 mg 50 mL 1,000 mg Intravenous New Bag     08/07/2022 1957 levETIRAcetam (KEPPRA) 1,000 mg in sodium chloride 0 9 % 100 mL IVPB 1,000 mg Intravenous New Bag     08/08/2022 1006 levETIRAcetam (KEPPRA) tablet 500 mg 500 mg Oral Given     08/08/2022 1006 potassium chloride (K-DUR,KLOR-CON) CR tablet 40 mEq 40 mEq Oral Given     Past Medical History:   Diagnosis Date    Stroke [facility])      Present on Admission:   Hemiplegia ([facility] [address] )   Atrial fibrillation with RVR ([facility] [address] )   New onset seizure ([facility] [address] )   Aspiration pneumonia ([facility] [address] )   Thrombocytopenia ([facility] [address] )      Ad[facility]: [facility] [I48 [id number]]  Age/Sex: 63 y o  female  Admission Orders:  Scheduled Medications:  apixaban, 5 mg, Oral, BID  atorvastatin, 40 mg, Oral, Daily  levETIRAcetam, 500 mg, Oral, Q12H [address]  metoprolol succinate, 25 mg, Oral, Daily  potassium chloride, 40 mEq, Oral, BID  sertraline, 50 mg, Oral, Daily  thiamine, 100 mg, Oral, Daily    Continuous IV Infusions:  lactated ringers, 75 mL/hr, Intravenous, Continuous    PRN Meds:  acetaminophen, 650 mg, Oral, Q6H PRN  aluminum-magnesium hydroxide-simethicone, 30 mL, Oral, Q6H PRN  magnesium hydroxide, 30 mL, Oral, Daily PRN  ondansetron, 4 mg, Intravenous, Q6H PRN  simethicone, 80 mg, Oral, 4x Daily PRN        Network Utilization Review Department  ATTENTION: Please call with any questions or concerns to [phone] and carefully listen to the prompts so that you are directed to the right person  All voicemails are confidential   Nay Bowen all requests for admission clinical reviews, approved or denied determinations and any other requests to dedicated fax number below belonging to the campus where the patient is receiving treatment   List of dedicated fax numbers for the Facilities:  [address] DENIALS (Administrative/Medical Necessity) [phone]   [address] (Maternity/NICU/Pediatrics) [phone]   [address]  [address] [facility] [place] [address] [phone] [address] [place] [address] [place] [address] [phone]

## 2022-08-08 NOTE — PROGRESS NOTES
[facility] Progress Note - Magi Moser 1960, 63 y o  female MRN: [id number]  Unit/Bed#: ED 15 Encounter: [id number]  Primary Care Provider: No primary care provider on file  Date and time admitted to hospital: 8/7/2022  2:23 PM    * New onset seizure [facility])  Assessment & Plan  · Patient came in with witnessed seizure at home  At the time EMS arrived the patient started to wake up but was initially confused and somnolent  · Patient does have past medical history CVA with right-sided weakness and some expressive aphasia  · CT head was normal  · Neurology consulted, EEG pending  · Started on Keppra in ER     Aspiration pneumonia ([facility] [address] )  Assessment & Plan  · Concern for aspiration subsequent to the seizure episode  · Given ceftriaxone and fluids, procal is elevated x1, repeat pending  · Afebrile, no leukocytosis   · CXR negative    Atrial fibrillation with RVR ([facility] [address] )  Assessment & Plan  · Patient came in with atrial fibrillation with rapid ventricular rate with a heart rate of 160, possibly precipitated by the seizure Continue anticoagulation with Eliquis and continue oral metoprolol    Blood pressure 115/63, pulse (!) 54, temperature 99 7 °F (37 6 °C), temperature source Oral, resp  rate 20, SpO2 94 %  Hemiplegia ([facility] [address] )  Assessment & Plan  · This is chronic and there is no new weakness  · Physical and occupational therapy to continue    Thrombocytopenia (HCC)  Assessment & Plan  · Decreased from 129 ->98  · Monitor daily CBC, no s/sx bleeding or abnormal bruising   · Etiology unclear        VTE Pharmacologic Prophylaxis: VTE Score: 1 Low Risk (Score 0-2) - Encourage Ambulation  Patient Centered Rounds: I performed bedside rounds with nursing staff today  Discussions with Specialists or Other Care Team Provider: pending neuro consult     Education and Discussions with Family / Patient: Updated  (son) via phone  Time Spent for Care: 20 minutes   More than 50% of total time spent on counseling and coordination of care as described above  Current Length of Stay: 1 day(s)  Current Patient Status: Inpatient   Certification Statement: The patient will continue to require additional inpatient hospital stay due to pending neuro consult, therapy evals  Discharge Plan: Anticipate discharge in 24-48 hrs to discharge location to be determined pending rehab evaluations  Code Status: Level 1 - Full Code    Subjective:   Patient seen, sleeping soundly  Denies acute issues  She states feeling better, no confusion or headache today  Agreeable to neurology consultation  Objective:     Vitals:   Temp (24hrs), Av 7 °F (37 6 °C), Min:99 7 °F (37 6 °C), Max:99 7 °F (37 6 °C)    Temp:  [99 7 °F (37 6 °C)] 99 7 °F (37 6 °C)  HR:  [] 54  Resp:  [8-22] 20  BP: ()/(54-80) 115/63  SpO2:  [92 %-96 %] 94 %  There is no height or weight on file to calculate BMI  Input and Output Summary (last 24 hours): Intake/Output Summary (Last 24 hours) at 2022 0736  Last data filed at 2022  Gross per 24 hour   Intake 1450 ml   Output --   Net 1450 ml       Physical Exam:   Physical Exam  Vitals and nursing note reviewed  Constitutional:       General: She is sleeping  She is not in acute distress  Appearance: She is not toxic-appearing  Cardiovascular:      Rate and Rhythm: Normal rate  Pulses: Normal pulses  Pulmonary:      Effort: Pulmonary effort is normal  No respiratory distress  Breath sounds: No wheezing  Skin:     Coloration: Skin is not pale  Neurological:      Mental Status: She is oriented to person, place, and time and easily aroused     Psychiatric:         Mood and Affect: Mood normal          Behavior: Behavior normal            Additional Data:     Labs:  Results from last 7 days   Lab Units 22  0449   WBC Thousand/uL 5 63   HEMOGLOBIN g/dL 10 4*   HEMATOCRIT % 32 1*   PLATELETS Thousands/uL 98*   NEUTROS PCT % 74 LYMPHS PCT % 18   MONOS PCT % 6   EOS PCT % 1     Results from last 7 days   Lab Units 08/08/22  0449   SODIUM mmol/L 141   POTASSIUM mmol/L 3 1*   CHLORIDE mmol/L 109*   CO2 mmol/L 24   BUN mg/dL 8   CREATININE mg/dL 0 56*   ANION GAP mmol/L 8   CALCIUM mg/dL 8 4   ALBUMIN g/dL 2 8*   TOTAL BILIRUBIN mg/dL 0 69   ALK PHOS U/L 59   ALT U/L 17   AST U/L 17   GLUCOSE RANDOM mg/dL 93         Results from last 7 days   Lab Units 08/07/22  1425   POC GLUCOSE mg/dl 141*         Results from last 7 days   Lab Units 08/07/22  1431   LACTIC ACID mmol/L 1 9   PROCALCITONIN ng/ml 0 81*       Lines/Drains:  Invasive Devices  Report    Peripheral Intravenous Line  Duration           Peripheral IV 08/07/22 Right;Ventral (anterior) Hand 1 day    Peripheral IV 08/07/22 Left Wrist <1 day                      Imaging: No pertinent imaging reviewed      Recent Cultures (last 7 days):         Last 24 Hours Medication List:   Current Facility-Administered Medications   Medication Dose Route Frequency Provider Last Rate    acetaminophen  650 mg Oral Q6H PRN Leopold Ewings, MD      aluminum-magnesium hydroxide-simethicone  30 mL Oral Q6H PRN Leopold Ewings, MD      apixaban  5 mg Oral BID Leopold Ewings, MD      atorvastatin  40 mg Oral Daily Leopold Ewings, MD      cefTRIAXone  1,000 mg Intravenous Q24H Leopold Ewings, MD Stopped (08/07/22 1930)    lactated ringers  75 mL/hr Intravenous Continuous Leopold Ewings, MD 75 mL/hr (08/07/22 2052)    levETIRAcetam  500 mg Oral Q12H Mendoza Son MD      magnesium hydroxide  30 mL Oral Daily PRN Leopold Ewings, MD      metoprolol succinate  25 mg Oral Daily Leopold Ewings, MD      ondansetron  4 mg Intravenous Q6H PRN Leopold Ewings, MD      potassium chloride  40 mEq Oral BID Yessenia Mendez PA-C      sertraline  50 mg Oral Daily Leopold Ewings, MD      simethicone  80 mg Oral 4x Daily PRN Rikki Aileen Dominguez MD      thiamine  100 mg Oral Daily Alva Marrero MD          Today, Patient Was Seen By: Rosalie Levine PA-C    **Please Note: This note may have been constructed using a voice recognition system  **

## 2022-08-08 NOTE — ASSESSMENT & PLAN NOTE
- Appears to have right hemiparesis secondary to large left MCA infarct in October 2021  - Per PCP during last visit in April 2022, patient was noted to have labored speech and was ambulating with the assistance of a quad cane

## 2022-08-08 NOTE — CONSULTS
Consultation - Neurology   Janel Patel 63 y o  female MRN: [id number]  Unit/Bed#: ED 15 Encounter: [id number]      Assessment/Plan   * New onset seizure [facility])  Assessment & Plan  Janel Patel is a 63 y o  female with prior chronic left MCA territory infarct (October 2021) with right hemiparesis at baseline, AFib with RVR on Eliquis, HTN, HLD, prior tobacco abuse who presents to [facility] ED on 08/07/2022 following a witnessed seizure-like activity    Workup:  - CT head: Unremarkable for acute intracranial abnormalities; demonstrated chronic left MCA territory infarct  - Thrombocytopenia on presentation 129  - Procalcitonin elevated on presentation 0 81  - Labs WNL:  Troponin, FLU/RSV/COVID, lactic acid    New onset witnessed seizure, described as whole body shaking followed by somnolence/confusion  S/p IV Keppra load 1000 mg x 1 in the ED  Etiology remains unclear, however patient did have a fairly large left MCA territory infarct in October 2021, increasing the risk of developing seizures in the future  Will continue toxic metabolic workup and obtain MRI brain to rule out acute infarct      Plan:  - Routine EEG pending  - MRI brain pending  - Pending:  UA  - Okay to continue Eliquis 5 mg BID at this time  - Continue atorvastatin 40 mg daily  - S/p IV Keppra load 1000 mg x 1  - Started on Keppra 500 mg Q12H  - Seizure precautions  - Frequent neuro check  - PT/OT/speech  - Medical management supportive care per primary team, notify with changes    Aspiration pneumonia [facility])  Assessment & Plan  - Procalcitonin elevated on presentation 0 81  - Concern for aspiration pneumonia secondary to seizure per primary team  - Medical management per primary team    Hemiplegia [facility])  Assessment & Plan  - Appears to have right hemiparesis secondary to large left MCA infarct in October 2021  - Per PCP during last visit in April 2022, patient was noted to have labored speech and was ambulating with the assistance of a quad cane    Atrial fibrillation with RVR (HCC)  Assessment & Plan  - On Eliquis 5 mg BID at baseline, plan to continue  - Medical management per primary team    Bertrand Patel will need follow up in in 4 weeks with epilepsy attending or advance practitioner  She will not require outpatient neurological testing  History of Present Illness     Reason for Consult / Principal Problem:  New onset seizure, hemiplegia right dominant side as late effects of cerebral infarction  Hx and PE limited by: patient with aphasia   HPI: Bertrand Patel is a 63 y o   female with prior chronic left MCA territory infarct (October 2021) with right hemiparesis at baseline, AFib with RVR on Eliquis, HTN, HLD, prior tobacco abuse who presents to [facility] ED on 08/07/2022 following a witnessed seizure-like activity    History obtained per chart review  Family reports witnessing a seizure at home described as whole body shaking  EMS was called and patient was brought to the ED  Patient unable to recall the event  Of note, patient had presented to [facility] as a stroke alert on 10/14/2022 after being found with aphasia and right-sided weakness  NIHSS of 19  Patient was found to have evidence of a subacute left MCA territory infarct on CT head as well as an occluded left M1 segment on CTA head and neck  She has found to be in AFib on presentation  Patient was not an IV tPA candidate or thrombectomy candidate  Throughout her admission, patient had hemorrhagic transformation edema  She did not require any neurosurgical intervention  After starting ASA, patient and did have mild thrombocytopenia however this was documented to have stabilized  Patient is on Eliquis 5 mg BID at baseline for history of AFib  Patient presented to [facility] ED on 08/07/2022, hypotensive on presentation with a BP of 76/54  Upon presentation, patient was found to be in AFib with RVR, HR of 160, and was given IV metoprolol with improvement    CT head unremarkable for acute intracranial abnormalities, however did demonstrate a large chronic left MCA territory infarct  Lab work revealed thrombocytopenia Per chart review, in the ED patient was documented to have mild expressive aphasia in right upper extremity weakness  Patient was loaded with IV Keppra 1000 mg x 1 and started on a maintenance of 500 mg Q12H  Inpatient consult to Neurology  Consult performed by: Harika King PA-C  Consult ordered by: Moon Mcmahon MD        Review of Systems  12 point ROS limited to aphasia   Historical Information   Past Medical History:   Diagnosis Date    Stroke [facility])      Past Surgical History:   Procedure Laterality Date    OVARY SURGERY       Social History   Social History     Substance and Sexual Activity   Alcohol Use Not Currently     Social History     Substance and Sexual Activity   Drug Use Not Currently     E-Cigarette/Vaping    E-Cigarette Use Never User      E-Cigarette/Vaping Substances    Nicotine No     THC No     CBD No     Flavoring No     Other No     Unknown No      Social History     Tobacco Use   Smoking Status Former Smoker   Smokeless Tobacco Never Used     Family History: History reviewed  No pertinent family history  Review of previous medical records was completed      Meds/Allergies   all current active meds have been reviewed, current meds:   Current Facility-Administered Medications   Medication Dose Route Frequency    acetaminophen (TYLENOL) tablet 650 mg  650 mg Oral Q6H PRN    aluminum-magnesium hydroxide-simethicone (MYLANTA) oral suspension 30 mL  30 mL Oral Q6H PRN    apixaban (ELIQUIS) tablet 5 mg  5 mg Oral BID    atorvastatin (LIPITOR) tablet 40 mg  40 mg Oral Daily    lactated ringers infusion  75 mL/hr Intravenous Continuous    levETIRAcetam (KEPPRA) tablet 500 mg  500 mg Oral Q12H [address]    magnesium hydroxide (MILK OF MAGNESIA) oral suspension 30 mL  30 mL Oral Daily PRN    metoprolol succinate (TOPROL-XL) 24 hr tablet 25 mg  25 mg Oral Daily  ondansetron (ZOFRAN) injection 4 mg  4 mg Intravenous Q6H PRN    potassium chloride (K-DUR,KLOR-CON) CR tablet 40 mEq  40 mEq Oral BID    sertraline (ZOLOFT) tablet 50 mg  50 mg Oral Daily    simethicone (MYLICON) chewable tablet 80 mg  80 mg Oral 4x Daily PRN    thiamine tablet 100 mg  100 mg Oral Daily   , PTA meds:   Prior to Admission Medications   Prescriptions Last Dose Informant Patient Reported? Taking? apixaban (ELIQUIS) 5 mg  Self No Yes   Sig: Take 1 tablet (5 mg total) by mouth 2 (two) times a day   atorvastatin (LIPITOR) 40 mg tablet   No Yes   Sig: TAKE 1 TABLET BY MOUTH EVERY DAY   metoprolol succinate (Toprol XL) 25 mg 24 hr tablet  Self No Yes   Sig: Take 1 tablet (25 mg total) by mouth daily   sertraline (ZOLOFT) 50 mg tablet   No Yes   Sig: TAKE ONE TABLET BY MOUTH DAILY   thiamine 100 MG tablet  Self Yes Yes   Sig: Take 100 mg by mouth daily      Facility-Administered Medications: None    and     No Known Allergies    Objective   Vitals:Blood pressure 106/73, pulse 63, temperature 99 7 °F (37 6 °C), temperature source Oral, resp  rate 16, SpO2 93 %  ,There is no height or weight on file to calculate BMI  Intake/Output Summary (Last 24 hours) at 8/8/2022 1629  Last data filed at 8/7/2022 2052  Gross per 24 hour   Intake 950 ml   Output --   Net 950 ml       Invasive Devices: Invasive Devices  Report    Peripheral Intravenous Line  Duration           Peripheral IV 08/07/22 Left Wrist 1 day    Peripheral IV 08/07/22 Right;Ventral (anterior) Hand 1 day                Physical Exam  Vitals and nursing note reviewed  Constitutional:       General: She is not in acute distress  Appearance: Normal appearance  She is not ill-appearing, toxic-appearing or diaphoretic  HENT:      Head: Normocephalic and atraumatic  Eyes:      General: No scleral icterus  Right eye: No discharge  Left eye: No discharge        Conjunctiva/sclera: Conjunctivae normal    Musculoskeletal: General: Normal range of motion  Cervical back: Normal range of motion and neck supple  Skin:     General: Skin is warm and dry  Coloration: Skin is not jaundiced or pale  Findings: No bruising, erythema, lesion or rash  Neurological:      Mental Status: She is alert  Psychiatric:         Mood and Affect: Mood normal          Behavior: Behavior normal        Neurologic Exam     Mental Status   Patient is asleep upon entering the room, easily awakens to verbal stimuli  Oriented to person  Difficulty naming where she is, however is able to pick from several options  Able to follow some simple commands, both central and appendicular, has difficulty with other simple commands  No dysarthria noted   Mild-moderate expressive and receptive aphasia noted      Cranial Nerves     CN III, IV, VI   Conjugate gaze: present    Difficulty with assessing for true visual field deficits due to aphasia  Able to see movement in all quadrants   Blink to threat intact   EOMs intact without evidence of nystagmus   Minimal right lower facial weakness noted at rest and minimally present when smiling   Tongue midline, no obvious tongue laceration noted   Difficulty visualizing palate      Motor Exam   Increased tone noted in RUE  Able to wiggle fingers on the right  Right  3/5  Only able to lift hand off the bed, unable to elevate elbow off the bed   RUE drifts down to bed immediately when placed in antigravity position   LUE strength 5/5  BLE strength 5/5     Sensory Exam     Decreased sensation to light touch in RUE, otherwise intact   Extinguished in RUE and RLE with bilateral simultaneous stimulation      Gait, Coordination, and Reflexes     Tremor   Resting tremor: absentNo ataxia or dysmetria noted in LUE finger to nose testing   Unable to assess RUE coordination due to weakness     RUE reflexes brisk, positive hoffmans   LUE reflexes 1+ throughout, negative hoffmans   Right patellar reflex 2 +, negative cross adductor reflex   Left patellar reflex 2  Bilateral achilles reflexes 2+    Right toe upgoing, left toe downgoing     No involuntary movements or rhythmic seizure like activity noted throughout exam      Lab Results: I have personally reviewed pertinent reports    Recent Results (from the past 24 hour(s))   HS Troponin I 2hr    Collection Time: 08/07/22  4:46 PM   Result Value Ref Range    hs TnI 2hr 8 "Refer to ACS Flowchart"- see link ng/L    Delta 2hr hsTnI 4 <20 ng/L   HS Troponin I 4hr    Collection Time: 08/07/22 10:18 PM   Result Value Ref Range    hs TnI 4hr 9 "Refer to ACS Flowchart"- see link ng/L    Delta 4hr hsTnI 5 <20 ng/L   Comprehensive metabolic panel    Collection Time: 08/08/22  4:49 AM   Result Value Ref Range    Sodium 141 135 - 147 mmol/L    Potassium 3 1 (L) 3 5 - 5 3 mmol/L    Chloride 109 (H) 96 - 108 mmol/L    CO2 24 21 - 32 mmol/L    ANION GAP 8 4 - 13 mmol/L    BUN 8 5 - 25 mg/dL    Creatinine 0 56 (L) 0 60 - 1 30 mg/dL    Glucose 93 65 - 140 mg/dL    Calcium 8 4 8 3 - 10 1 mg/dL    Corrected Calcium 9 4 8 3 - 10 1 mg/dL    AST 17 5 - 45 U/L    ALT 17 12 - 78 U/L    Alkaline Phosphatase 59 46 - 116 U/L    Total Protein 6 6 6 4 - 8 4 g/dL    Albumin 2 8 (L) 3 5 - 5 0 g/dL    Total Bilirubin 0 69 0 20 - 1 00 mg/dL    eGFR 101 ml/min/1 73sq m   CBC and differential    Collection Time: 08/08/22  4:49 AM   Result Value Ref Range    WBC 5 63 4 31 - 10 16 Thousand/uL    RBC 3 34 (L) 3 81 - 5 12 Million/uL    Hemoglobin 10 4 (L) 11 5 - 15 4 g/dL    Hematocrit 32 1 (L) 34 8 - 46 1 %    MCV 96 82 - 98 fL    MCH 31 1 26 8 - 34 3 pg    MCHC 32 4 31 4 - 37 4 g/dL    RDW 12 6 11 6 - 15 1 %    MPV 12 3 8 9 - 12 7 fL    Platelets 98 (L) 481 - 390 Thousands/uL    nRBC 0 /100 WBCs    Neutrophils Relative 74 43 - 75 %    Immat GRANS % 0 0 - 2 %    Lymphocytes Relative 18 14 - 44 %    Monocytes Relative 6 4 - 12 %    Eosinophils Relative 1 0 - 6 %    Basophils Relative 1 0 - 1 %    Neutrophils Absolute 4  13 1 85 - 7 62 Thousands/µL    Immature Grans Absolute 0 01 0 00 - 0 20 Thousand/uL    Lymphocytes Absolute 1 03 0 60 - 4 47 Thousands/µL    Monocytes Absolute 0 35 0 17 - 1 22 Thousand/µL    Eosinophils Absolute 0 07 0 00 - 0 61 Thousand/µL    Basophils Absolute 0 04 0 00 - 0 10 Thousands/µL   ]  Imaging Studies: I have personally reviewed pertinent reports and I have personally reviewed pertinent films in PACS  EKG, Pathology, and Other Studies: I have personally reviewed pertinent reports  VTE Prophylaxis:  Eliquis    Dictation voice to text software has been used in the creation of this document  Please consider this in light of any contextual or grammatical errors

## 2022-08-08 NOTE — PLAN OF CARE
Problem: PHYSICAL THERAPY ADULT  Goal: Performs mobility at highest level of function for planned discharge setting  See evaluation for individualized goals  Description: Treatment/Interventions: Functional transfer training, LE strengthening/ROM, Therapeutic exercise, Endurance training, Patient/family training, Bed mobility, Gait training, Compensatory technique education, Spoke to nursing          See flowsheet documentation for full assessment, interventions and recommendations  Outcome: Progressing  Note: Prognosis: Good  Problem List: Decreased strength, Impaired balance, Decreased endurance, Decreased mobility, Decreased coordination  Assessment: Pt is 63 y o  female seen for PT evaluation s/p admit to [facility] on 8/7/2022 w/ New onset seizure ([facility] [address] )  PT consulted to assess pt's functional mobility and d/c needs  Order placed for PT eval and tx, w/ up w/ A order  Comorbidities affecting pt's physical performance at time of assessment include: new onset seizure, history of L MCA CVA, aspiration PNA, atrial fibrillation with RVR, hemiplegia  PTA, pt was independent w/ all functional mobility w/ SPC, ambulates community distances and elevations, ambulates household distances, has 0 JUSTIN, lives w/ sons in one level house and on disability  Personal factors affecting pt at time of IE include: inaccessible home environment, inability to ambulate household distances, inability to navigate community distances, inability to navigate level surfaces w/o external assistance, unable to perform dynamic tasks in community, inability to perform IADLs and inability to perform ADLs  Please find objective findings from PT assessment regarding body systems outlined above with impairments and limitations including weakness, impaired balance, decreased endurance, gait deviations, decreased activity tolerance, decreased functional mobility tolerance and fall risk   The following objective measures performed on IE also reveal limitations: Barthel Index: 50/100, Modified Tari: 4 (moderate/severe disability) and AM-PAC 6-Clicks: 35/32  Pt's clinical presentation is currently unstable/unpredictable seen in pt's presentation of continued need for medical management and monitoring, decreased strength and balance resulting in an increased risk for falls, impaired endurance and activity tolerance limiting overall mobility, impaired coordination of RLE  Pt to benefit from continued PT tx to address deficits as defined above and maximize level of functional independent mobility and consistency  From PT/mobility standpoint, recommendation at time of d/c would be post acute rehabilitation services pending progress in order to facilitate return to PLOF  Barriers to Discharge: Inaccessible home environment     PT Discharge Recommendation: Post acute rehabilitation services    See flowsheet documentation for full assessment

## 2022-08-08 NOTE — ED NOTES
Per PT, patient stated some weakness in R and L legs at this time when going to ambulate  Patient states normally ambulates on her own just fine  Patient also told PT some dizziness while ambulating  PT told RN, RN made provider aware  RN will update neurology team when at bedside       Jermain Howard RN  08/08/22 [id number]

## 2022-08-08 NOTE — ASSESSMENT & PLAN NOTE
· Concern for aspiration subsequent to the seizure episode  · Given ceftriaxone and fluids, procal is elevated x1, repeat pending  · Afebrile, no leukocytosis   · CXR negative

## 2022-08-08 NOTE — PLAN OF CARE
Problem: MOBILITY - ADULT  Goal: Maintain or return to baseline ADL function  Description: INTERVENTIONS:  -  Assess patient's ability to carry out ADLs; assess patient's baseline for ADL function and identify physical deficits which impact ability to perform ADLs (bathing, care of mouth/teeth, toileting, grooming, dressing, etc )  - Assess/evaluate cause of self-care deficits   - Assess range of motion  - Assess patient's mobility; develop plan if impaired  - Assess patient's need for assistive devices and provide as appropriate  - Encourage maximum independence but intervene and supervise when necessary  - Involve family in performance of ADLs  - Assess for home care needs following discharge   - Consider OT consult to assist with ADL evaluation and planning for discharge  - Provide patient education as appropriate  Outcome: Progressing  Goal: Maintains/Returns to pre admission functional level  Description: INTERVENTIONS:  - Perform BMAT or MOVE assessment daily    - Set and communicate daily mobility goal to care team and patient/family/caregiver  - Collaborate with rehabilitation services on mobility goals if consulted  - Perform Range of Motion 4 times a day  - Reposition patient every 2 hours    - Dangle patient 4 times a day  - Stand patient 4 times a day  - Ambulate patient 3 times a day  - Out of bed to chair 3 times a day   - Out of bed for meals 3 times a day  - Out of bed for toileting  - Record patient progress and toleration of activity level   Outcome: Progressing     Problem: Prexisting or High Potential for Compromised Skin Integrity  Goal: Skin integrity is maintained or improved  Description: INTERVENTIONS:  - Identify patients at risk for skin breakdown  - Assess and monitor skin integrity  - Assess and monitor nutrition and hydration status  - Monitor labs   - Assess for incontinence   - Turn and reposition patient  - Assist with mobility/ambulation  - Relieve pressure over bony prominences  - Avoid friction and shearing  - Provide appropriate hygiene as needed including keeping skin clean and dry  - Evaluate need for skin moisturizer/barrier cream  - Collaborate with interdisciplinary team   - Patient/family teaching  - Consider wound care consult   Outcome: Progressing

## 2022-08-08 NOTE — ASSESSMENT & PLAN NOTE
· Patient came in with witnessed seizure at home    At the time EMS arrived the patient started to wake up but was initially confused and somnolent  · Patient does have past medical history CVA with right-sided weakness and some expressive aphasia  · CT head was normal  · Neurology consulted, EEG pending  · Started on Keppra in ER

## 2022-08-08 NOTE — PHYSICAL THERAPY NOTE
Physical Therapy Evaluation     Patient's Name: Zohra Sorenson    Admitting Diagnosis  Atrial fibrillation [facility]) [I48 91]    Problem List  Patient Active Problem List   Diagnosis    Acute ischemic left MCA stroke ([facility] )    Hemiplegia ([facility] )    Mixed hyperlipidemia    Paroxysmal atrial fibrillation (HCC)    Atrial fibrillation with RVR ([facility] )    New onset seizure ([facility] )    Aspiration pneumonia ([facility] )    Thrombocytopenia ([facility] )       Past Medical History  Past Medical History:   Diagnosis Date    Stroke [facility])        Past Surgical History  Past Surgical History:   Procedure Laterality Date    OVARY SURGERY          08/08/22 1127   PT Last Visit   PT Visit Date 08/08/22   Note Type   Note type Evaluation   Pain Assessment   Pain Assessment Tool 0-10   Pain Score No Pain   Restrictions/Precautions   Weight Bearing Precautions Per Order No   Braces or Orthoses Other (Comment)  (none per patient)   Other Precautions Fall Risk;Telemetry;Multiple lines  (aphasia)   Home Living   Type of [address] [place] [address] One level;Performs ADLs on one level; Able to live on main level with bedroom/bathroom  (0 JUSTIN)   Bathroom Shower/Tub Tub/shower unit   Bathroom Toilet Standard   Bathroom Equipment Grab bars in shower   [address]   Additional Comments Utilizes SPC occasionally in home and community   Prior Function   Level of [place] Independent with ADLs and functional mobility   Lives With Son  (2 sons)   Receives Help From Family   ADL Assistance Independent   IADLs Needs assistance   Falls in the last 6 months 0   Vocational On disability   Comments (-) drives, sons assist with transport   General   Family/Caregiver Present No   Cognition   Overall Cognitive Status Impaired   Arousal/Participation Alert   Orientation Level   (Unable to formally assess due to aphasia)   Memory Decreased short term memory;Decreased recall of recent events   Following Commands Follows one step commands with increased time or repetition   Comments Pt agreeable to PT   RLE Assessment   RLE Assessment X   Strength RLE   RLE Overall Strength 3+/5   LLE Assessment   LLE Assessment X   Strength LLE   LLE Overall Strength 4/5   Light Touch   RLE Light Touch Grossly intact   LLE Light Touch Grossly intact   Bed Mobility   Supine to Sit 4  Minimal assistance   Additional items Assist x 1;Bedrails; Increased time required;Verbal cues;LE management   Sit to Supine 4  Minimal assistance   Additional items Assist x 1;Bedrails; Increased time required;Verbal cues;LE management   Additional Comments Pt received at start of session supine in bed with HOB elevated  Following session, pt returned to bed and remained with needs met, call bell within reach   Transfers   Sit to Stand 4  Minimal assistance   Additional items Assist x 1; Armrests; Increased time required;Verbal cues   Stand to Sit 4  Minimal assistance   Additional items Assist x 1; Armrests; Increased time required;Verbal cues   Additional Comments without device   Ambulation/Elevation   Gait pattern Decreased foot clearance; Inconsistent darya; Short stride;Narrow ELENI   Gait Assistance 3  Moderate assist   Additional items Assist x 1;Verbal cues   Assistive Device None   Distance 3' towards [facility]   Stair Management Assistance Not tested   Balance   Static Sitting Fair   Dynamic Sitting Fair -   Static Standing Poor +   Dynamic Standing Poor +   Ambulatory Poor   Endurance Deficit   Endurance Deficit Yes   Endurance Deficit Description decreased activity tolerance   Activity Tolerance   Activity Tolerance Patient limited by fatigue; Other (Comment)  (reports of dizziness)   Nurse Made Aware RN [facility]   Assessment   Prognosis Good   Problem List Decreased strength; Impaired balance;Decreased endurance;Decreased mobility; Decreased coordination   Assessment Pt is 63 y o  female seen for PT evaluation s/p admit to [facility] on 8/7/2022 w/ New onset seizure ([address] )   PT consulted to assess pt's functional mobility and d/c needs  Order placed for PT eval and tx, w/ up w/ A order  Comorbidities affecting pt's physical performance at time of assessment include: new onset seizure, history of L MCA CVA, aspiration PNA, atrial fibrillation with RVR, hemiplegia  PTA, pt was independent w/ all functional mobility w/ SPC, ambulates community distances and elevations, ambulates household distances, has 0 JUSTIN, lives w/ sons in one level house and on disability  Personal factors affecting pt at time of IE include: inaccessible home environment, inability to ambulate household distances, inability to navigate community distances, inability to navigate level surfaces w/o external assistance, unable to perform dynamic tasks in community, inability to perform IADLs and inability to perform ADLs  Please find objective findings from PT assessment regarding body systems outlined above with impairments and limitations including weakness, impaired balance, decreased endurance, gait deviations, decreased activity tolerance, decreased functional mobility tolerance and fall risk  The following objective measures performed on IE also reveal limitations: Barthel Index: 50/100, Modified [place]: 4 (moderate/severe disability) and AM-PAC 6-Clicks: 99/85  Pt's clinical presentation is currently unstable/unpredictable seen in pt's presentation of continued need for medical management and monitoring, decreased strength and balance resulting in an increased risk for falls, impaired endurance and activity tolerance limiting overall mobility, impaired coordination of RLE  Pt to benefit from continued PT tx to address deficits as defined above and maximize level of functional independent mobility and consistency  From PT/mobility standpoint, recommendation at time of d/c would be post acute rehabilitation services pending progress in order to facilitate return to PLOF     Barriers to Discharge Inaccessible home environment Goals   Patient Goals None stated   [facility] Expiration Date 08/18/22   Short Term Goal #1 In 7-10 days: Increase bilateral LE strength 1/2 grade to facilitate independent mobility, Perform all bed mobility tasks modified independent to decrease caregiver burden, Perform all transfers modified independent to improve independence, Ambulate > 100 ft  with least restrictive assistive device with close S w/o LOB and w/ normalized gait pattern 100% of the time, Tolerate standing 5 minutes to facilitate functional task performance and PT provider will perform functional balance assessment to determine fall risk   Plan   Treatment/Interventions Functional transfer training;LE strengthening/ROM; Therapeutic exercise; Endurance training;Patient/family training;Bed mobility;Gait training; Compensatory technique education;Spoke to nursing   PT Frequency 3-5x/wk   Recommendation   PT Discharge Recommendation Post acute rehabilitation services   AM-PAC Basic Mobility Inpatient   Turning in Bed Without Bedrails 3   Lying on Back to Sitting on Edge of Flat Bed 3   Moving Bed to Chair 2   Standing Up From Chair 3   Walk in Room 2   Climb 3-5 Stairs 1   Basic Mobility Inpatient Raw Score 14   Basic Mobility Standardized Score 35 55   Highest Level Of Mobility   -[facility] Goal 4: Move to chair/commode   -[facility] Achieved 5: Stand (1 or more minutes)   Modified [place] Scale   Modified [place] Scale 4   Barthel Index   Feeding 10   Bathing 0   Grooming Score 0   Dressing Score 5   Bladder Score 10   Bowels Score 10   Toilet Use Score 5   Transfers (Bed/Chair) Score 10   Mobility (Level Surface) Score 0   Stairs Score 0   Barthel Index Score 50       Roseline Rios, PT

## 2022-08-08 NOTE — ED NOTES
Patient linens and gown changed   Patient skin was assessed, purwick put in place at this time     Refugia Pod, RN  08/08/22 5870

## 2022-08-08 NOTE — ASSESSMENT & PLAN NOTE
· Decreased from 129 ->98  · Monitor daily CBC, no s/sx bleeding or abnormal bruising   · Etiology unclear

## 2022-08-08 NOTE — ED NOTES
Patient given toothbrush and toothpaste to perform personal hygiene     Serena Hoff RN  08/08/22 9492

## 2022-08-08 NOTE — CASE MANAGEMENT
Case Management Progress Note    Patient name Guilherme Jaramillo  Location ED 15/ED 15 MRN [id number]  : 1960 Date 2022       LOS (days): 1  Geometric Mean LOS (GMLOS) (days):   Days to GMLOS:        OBJECTIVE:        Current admission status: Inpatient  Preferred Pharmacy:   [id number] [place], [place] - [address] [address]  Phone: [phone] Fax: [phone]    Primary Care Provider: No primary care provider on file  Primary Insurance: Andres Irving  Secondary Insurance:     PROGRESS NOTE:    Per chart review, patient was IPTA  She has insurance through [employer]  No CM needs identified at this time  CM department will continue to follow patient through discharge

## 2022-08-08 NOTE — ED NOTES
Patient cleaned up at this time, found with loose BM   Patient states normally can feel when she has to go, but did not feel at this time     Caren Marrero RN  08/08/22 1317

## 2022-08-08 NOTE — ASSESSMENT & PLAN NOTE
· Patient came in with atrial fibrillation with rapid ventricular rate with a heart rate of 160, possibly precipitated by the seizure Continue anticoagulation with Eliquis and continue oral metoprolol    Blood pressure 115/63, pulse (!) 54, temperature 99 7 °F (37 6 °C), temperature source Oral, resp  rate 20, SpO2 94 %

## 2022-08-08 NOTE — ASSESSMENT & PLAN NOTE
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 and she does have speech and word-finding expressive aphasia problems which she reports she did not have prior to this seizure  I suspect these 2 are related to the generalized seizure  I expect both of these to improve over the next 24 hours  Workup:  -  MRI brain:  No new infarct appreciated  She does have the encephalomalacia and hemosiderin staining related to the October stroke appreciated  -  EEG: Abnormal due to continuous delta and theta activity over the left frontal region and intermittent delta activity over the left hemisphere,   indicate the presence of a structural lesion causing focal cerebral dysfunction over the left hemisphere, maximal over the left frontal region  - CT head: Unremarkable for acute intracranial abnormalities; demonstrated chronic left MCA territory infarct  - Thrombocytopenia on presentation 129  - Procalcitonin elevated on presentation 0 81  - Labs WNL:  Troponin, FLU/RSV/COVID, lactic acid  Given the size and location of her stroke it is not 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

## 2022-08-09 ENCOUNTER — APPOINTMENT (INPATIENT)
Dept: MRI IMAGING | Facility: HOSPITAL | Age: 62
DRG: 058 | End: 2022-08-09
Payer: COMMERCIAL

## 2022-08-09 LAB
ANION GAP SERPL CALCULATED.3IONS-SCNC: 11 MMOL/L (ref 4–13)
BUN SERPL-MCNC: 5 MG/DL (ref 5–25)
CALCIUM SERPL-MCNC: 9.1 MG/DL (ref 8.3–10.1)
CHLORIDE SERPL-SCNC: 100 MMOL/L (ref 96–108)
CO2 SERPL-SCNC: 26 MMOL/L (ref 21–32)
CREAT SERPL-MCNC: 0.54 MG/DL (ref 0.6–1.3)
ERYTHROCYTE [DISTWIDTH] IN BLOOD BY AUTOMATED COUNT: 12.3 % (ref 11.6–15.1)
GFR SERPL CREATININE-BSD FRML MDRD: 102 ML/MIN/1.73SQ M
GLUCOSE SERPL-MCNC: 98 MG/DL (ref 65–140)
HCT VFR BLD AUTO: 39 % (ref 34.8–46.1)
HGB BLD-MCNC: 13 G/DL (ref 11.5–15.4)
MAGNESIUM SERPL-MCNC: 1.8 MG/DL (ref 1.6–2.6)
MCH RBC QN AUTO: 31.6 PG (ref 26.8–34.3)
MCHC RBC AUTO-ENTMCNC: 33.3 G/DL (ref 31.4–37.4)
MCV RBC AUTO: 95 FL (ref 82–98)
PLATELET # BLD AUTO: 125 THOUSANDS/UL (ref 149–390)
PMV BLD AUTO: 12.2 FL (ref 8.9–12.7)
POTASSIUM SERPL-SCNC: 3.4 MMOL/L (ref 3.5–5.3)
PROCALCITONIN SERPL-MCNC: 1.36 NG/ML
RBC # BLD AUTO: 4.11 MILLION/UL (ref 3.81–5.12)
SODIUM SERPL-SCNC: 137 MMOL/L (ref 135–147)
WBC # BLD AUTO: 5.63 THOUSAND/UL (ref 4.31–10.16)

## 2022-08-09 PROCEDURE — G1004 CDSM NDSC: HCPCS

## 2022-08-09 PROCEDURE — 83735 ASSAY OF MAGNESIUM: CPT | Performed by: PHYSICIAN ASSISTANT

## 2022-08-09 PROCEDURE — 99232 SBSQ HOSP IP/OBS MODERATE 35: CPT | Performed by: PSYCHIATRY & NEUROLOGY

## 2022-08-09 PROCEDURE — 84145 PROCALCITONIN (PCT): CPT | Performed by: PHYSICIAN ASSISTANT

## 2022-08-09 PROCEDURE — 85027 COMPLETE CBC AUTOMATED: CPT | Performed by: PHYSICIAN ASSISTANT

## 2022-08-09 PROCEDURE — 70551 MRI BRAIN STEM W/O DYE: CPT

## 2022-08-09 PROCEDURE — 99232 SBSQ HOSP IP/OBS MODERATE 35: CPT | Performed by: PHYSICIAN ASSISTANT

## 2022-08-09 PROCEDURE — 80048 BASIC METABOLIC PNL TOTAL CA: CPT | Performed by: PHYSICIAN ASSISTANT

## 2022-08-09 RX ADMIN — APIXABAN 5 MG: 5 TABLET, FILM COATED ORAL at 17:34

## 2022-08-09 RX ADMIN — LEVETIRACETAM 500 MG: 500 TABLET, FILM COATED ORAL at 20:32

## 2022-08-09 RX ADMIN — LEVETIRACETAM 500 MG: 500 TABLET, FILM COATED ORAL at 10:26

## 2022-08-09 RX ADMIN — THIAMINE HCL TAB 100 MG 100 MG: 100 TAB at 10:25

## 2022-08-09 RX ADMIN — ATORVASTATIN CALCIUM 40 MG: 40 TABLET, FILM COATED ORAL at 10:26

## 2022-08-09 RX ADMIN — SODIUM CHLORIDE, SODIUM LACTATE, POTASSIUM CHLORIDE, AND CALCIUM CHLORIDE 75 ML/HR: .6; .31; .03; .02 INJECTION, SOLUTION INTRAVENOUS at 13:01

## 2022-08-09 RX ADMIN — METOPROLOL SUCCINATE 25 MG: 25 TABLET, EXTENDED RELEASE ORAL at 10:25

## 2022-08-09 RX ADMIN — SERTRALINE HYDROCHLORIDE 50 MG: 50 TABLET ORAL at 10:25

## 2022-08-09 RX ADMIN — APIXABAN 5 MG: 5 TABLET, FILM COATED ORAL at 10:25

## 2022-08-09 NOTE — PLAN OF CARE
Problem: NEUROSENSORY - ADULT  Goal: Achieves stable or improved neurological status  Description: INTERVENTIONS  - Monitor and report changes in neurological status  - Monitor vital signs such as temperature, blood pressure, glucose, and any other labs ordered   - Initiate measures to prevent increased intracranial pressure  - Monitor for seizure activity and implement precautions if appropriate      Outcome: Progressing  Goal: Remains free of injury related to seizures activity  Description: INTERVENTIONS  - Maintain airway, patient safety  and administer oxygen as ordered  - Monitor patient for seizure activity, document and report duration and description of seizure to physician/advanced practitioner  - If seizure occurs,  ensure patient safety during seizure  - Reorient patient post seizure  - Seizure pads on all 4 side rails  - Instruct patient/family to notify RN of any seizure activity including if an aura is experienced  - Instruct patient/family to call for assistance with activity based on nursing assessment  - Administer anti-seizure medications if ordered    Outcome: Progressing  Goal: Achieves maximal functionality and self care  Description: INTERVENTIONS  - Monitor swallowing and airway patency with patient fatigue and changes in neurological status  - Encourage and assist patient to increase activity and self care  - Encourage visually impaired, hearing impaired and aphasic patients to use assistive/communication devices  Outcome: Progressing     Problem: Knowledge Deficit  Goal: Patient/family/caregiver demonstrates understanding of disease process, treatment plan, medications, and discharge instructions  Description: Complete learning assessment and assess knowledge base    Interventions:  - Provide teaching at level of understanding  - Provide teaching via preferred learning methods  Outcome: Progressing

## 2022-08-09 NOTE — ASSESSMENT & PLAN NOTE
· Patient came in with witnessed seizure at home    At the time EMS arrived the patient started to wake up but was initially confused and somnolent  · Patient does have past medical history CVA with right-sided weakness and some expressive aphasia  · CT head was normal, MRI brain pending   · Neurology consulted, EEG without epileptiform waves  · Started on Keppra in ER, plan on continuing  · Seizure precautions  · PT/OT consults noted - STR

## 2022-08-09 NOTE — PLAN OF CARE
Problem: MOBILITY - ADULT  Goal: Maintain or return to baseline ADL function  Description: INTERVENTIONS:  -  Assess patient's ability to carry out ADLs; assess patient's baseline for ADL function and identify physical deficits which impact ability to perform ADLs (bathing, care of mouth/teeth, toileting, grooming, dressing, etc )  - Assess/evaluate cause of self-care deficits   - Assess range of motion  - Assess patient's mobility; develop plan if impaired  - Assess patient's need for assistive devices and provide as appropriate  - Encourage maximum independence but intervene and supervise when necessary  - Involve family in performance of ADLs  - Assess for home care needs following discharge   - Consider OT consult to assist with ADL evaluation and planning for discharge  - Provide patient education as appropriate  Outcome: Progressing     Problem: Prexisting or High Potential for Compromised Skin Integrity  Goal: Skin integrity is maintained or improved  Description: INTERVENTIONS:  - Identify patients at risk for skin breakdown  - Assess and monitor skin integrity  - Assess and monitor nutrition and hydration status  - Monitor labs   - Assess for incontinence   - Turn and reposition patient  - Assist with mobility/ambulation  - Relieve pressure over bony prominences  - Avoid friction and shearing  - Provide appropriate hygiene as needed including keeping skin clean and dry  - Evaluate need for skin moisturizer/barrier cream  - Collaborate with interdisciplinary team   - Patient/family teaching  - Consider wound care consult   Outcome: Progressing     Problem: Potential for Falls  Goal: Patient will remain free of falls  Description: INTERVENTIONS:  - Educate patient/family on patient safety including physical limitations  - Instruct patient to call for assistance with activity   - Consult OT/PT to assist with strengthening/mobility   - Keep Call bell within reach  - Keep bed low and locked with side rails adjusted as appropriate  - Keep care items and personal belongings within reach  - Initiate and maintain comfort rounds  - Make Fall Risk Sign visible to staff  - Offer Toileting every  2  Hours, in advance of need  - Initiate/Maintain  daily alarm  - Obtain necessary fall risk management equipment:  daily  - Apply yellow socks and bracelet for high fall risk patients  - Consider moving patient to room near nurses station  Outcome: Progressing     Problem: PAIN - ADULT  Goal: Verbalizes/displays adequate comfort level or baseline comfort level  Description: Interventions:  - Encourage patient to monitor pain and request assistance  - Assess pain using appropriate pain scale  - Administer analgesics based on type and severity of pain and evaluate response  - Implement non-pharmacological measures as appropriate and evaluate response  - Consider cultural and social influences on pain and pain management  - Notify physician/advanced practitioner if interventions unsuccessful or patient reports new pain  Outcome: Progressing

## 2022-08-09 NOTE — UTILIZATION REVIEW
Inpatient Admission Authorization Request   NOTIFICATION OF INPATIENT ADMISSION/INPATIENT AUTHORIZATION REQUEST   SERVICING FACILITY:   [address] [place] [address]  Tax ID: [id number]  NPI: [id number]  Place of Service: Inpatient [facility]  [address]  Place of Service Code: 24     ATTENDING PROVIDER:  Attending Name and NPI#: Mike Kwon Md [[id number]]  Address: [address] [place] [address]  Phone: [phone]     UTILIZATION REVIEW CONTACT:  Antwon Encarnacion, Utilization   Network Utilization Review Department  Phone: [phone]  Fax [phone]  Email: [email or web address]     PHYSICIAN ADVISORY SERVICES:  FOR [id number]-[id number] REVIEW - MEDICAL NECESSITY DENIAL  Phone: [phone]  Fax: [phone]  Email: [email or web address]     TYPE OF REQUEST:  Inpatient Status     ADMISSION INFORMATION:  ADMISSION DATE/TIME: 8/7/22  3:35 PM  PATIENT DIAGNOSIS CODE/DESCRIPTION:  Atrial fibrillation ([address] ) [I48 91]  Atrial fibrillation with rapid ventricular response ([address] ) [I48 91]  New onset seizure ([address] ) [R56 9]  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, unspecified hemiplegia type ([address] ) [I69 351]  DISCHARGE DATE/TIME: No discharge date for patient encounter  IMPORTANT INFORMATION:  Please contact Antwon Encarnacion directly with any questions or concerns regarding this request  Department voicemails are confidential     Send requests for admission clinical reviews, concurrent reviews, approvals, and administrative denials due to lack of clinical to fax [phone]

## 2022-08-09 NOTE — ASSESSMENT & PLAN NOTE
· Concern for aspiration subsequent to the seizure episode  · Given ceftriaxone and fluids in ER - hold on further  · Procal is elevated x1, repeat pending - could be secondary to seizure   · Afebrile, no leukocytosis   · CXR negative

## 2022-08-09 NOTE — PROGRESS NOTES
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, arm greater than leg at baseline, AFib with RVR now on Eliquis, HTN, HLD, prior tobacco abuse who presents to [facility] ED on 08/07/2022 following a witnessed seizure-like activity  Seizure, described as whole body shaking followed by somnolence/confusion  S/p IV Keppra load 1000 mg x 1 in the ED  Now on 500 and tolerated well q 12 hours  Her exam today is 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 and she does have speech and word-finding expressive aphasia problems which she reports she did not have prior to this seizure  I suspect these 2 are related to the generalized seizure  I expect both of these to improve over the next 24 hours  Workup:  -  MRI brain:  No new infarct appreciated  She does have the encephalomalacia and hemosiderin staining related to the October stroke appreciated  -  EEG: Abnormal due to continuous delta and theta activity over the left frontal region and intermittent delta activity over the left hemisphere,   indicate the presence of a structural lesion causing focal cerebral dysfunction over the left hemisphere, maximal over the left frontal region  - CT head: Unremarkable for acute intracranial abnormalities; demonstrated chronic left MCA territory infarct  - Thrombocytopenia on presentation 129  - Procalcitonin elevated on presentation 0 81  - Labs WNL:  Troponin, FLU/RSV/COVID, lactic acid  Given the size and location of her stroke it is not unexpected that 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    There are no other apparent reasons for her seizures this time   Plan:  - Okay to continue Eliquis 5 mg BID at this time  - S/p IV Keppra load 1000 mg x 1, Continue on Keppra 500 mg Q12H  - Seizure precautions @ home  - Frequent neuro check  - PT/OT/speech not needed provided R arm regains power and speech improves  - Medical management supportive care per primary team, notify with changes    Aspiration pneumonia [facility])  Assessment & Plan  - Procalcitonin elevated on presentation 0 81  - Concern for aspiration pneumonia secondary to seizure per primary team  - Medical management per primary team    Atrial fibrillation with RVR ([address] )  Assessment & Plan  - On Eliquis 5 mg BID at baseline, plan to continue  - Medical management per primary team    Hemiplegia [facility])  Assessment & Plan  - Appears to have right hemiparesis secondary to large left MCA infarct in October 2021  - Per PCP during last visit in April 2022, patient was noted to have labored speech and was ambulating with the assistance of a quad cane    This patient needs to follow-up with either our neurology practice, where she can be seen here in our [place] office by any of our providers hopefully within a month, where she needs to follow up with Neurology [facility]  She is now on Keppra 500 mg q 12 hours daily in addition to her pre stroke meds including her Eliquis  Subjective/Objective     Subjective:  I am waiting for my arm to get stronger again  And I keep making speech mistakes    ROS:  The patient is clearly frustrated with her speech  She is also frustrated with her right arm  I discussed with her that this is likely a Mario's paralysis and as her MRI has no acute stroke she will likely regain full use of her right arm as it was last week prior to this event  She is walking well by her report, and see my note below I concur  The remainder of her ROS is within normal limits      Medication Dose Route Frequency    acetaminophen  650 mg Oral Q6H PRN    aluminum-magnesium hydroxide-simethicone  30 mL Oral Q6H PRN    apixaban  5 mg Oral BID    atorvastatin  40 mg Oral Daily    lactated ringers  75 mL/hr Intravenous Continuous    levETIRAcetam  500 mg Oral Q12H [address]    magnesium hydroxide  30 mL Oral Daily PRN    metoprolol succinate  25 mg Oral Daily    ondansetron  4 mg Intravenous Q6H PRN    sertraline  50 mg Oral Daily    simethicone  80 mg Oral 4x Daily PRN    thiamine  100 mg Oral Daily       acetaminophen    aluminum-magnesium hydroxide-simethicone    magnesium hydroxide    ondansetron    simethicone    Vitals: Blood pressure (!) 82/56, pulse 91, temperature 98 8 °F (37 1 °C), temperature source Oral, resp  rate 16, SpO2 94 %  ,There is no height or weight on file to calculate BMI  Physical Exam:     Faye Casillas seen in:  Out of bed in the chair  General appearance: alert,   Neck, Lungs, Heart, & abdomen: WNL  Extremities: atraumatic, no cyanosis or edema    Neurologic:   Mental status: Alert, oriented however she clearly has some expressive aphasia with multiple paraphasic errors at this time  She is able to pick correctly given a choice of 2 or 3  She is able to her point across with alternative communication strategies  She is able to point and identify pictures  CN: exam EOM's I, Gaze conjugate  No acutely lateralize right facial sensory & motor exam, (PP not tested on face)  Reminder CNVIII-XII normal    Motor: full power age appropriate x left limbs, her right arm has minimal power proximally and she is able to demonstrate a slight hand  distally  She reports her pre morbid post stroke power is such that she is generally able to demonstrate a biceps and triceps however she is not able to demonstrate he has full arm lift on the right side  Her gait as noted below is fairly normal, given her stroke  Sensory: grossly intact  X 4 limbs, PP not tested  Cerebellar: no ataxia or past pointing w pronation from a modified Romberg position     Gait: Fluid smooth but slightly antalgic favoring the right as she walks around the room, no LOB w directional change in the room  DTR's: Age appropriate,  Plantars:  Upgoing on the right mute on the left      Lab Results:   I have personally reviewed pertinent reports  , CBC:   Results from last 7 days   Lab Units 08/09/22  0831 08/08/22  0449 08/07/22  1431   WBC Thousand/uL 5 63 5 63 8 54   RBC Million/uL 4 11 3 34* 4 11   HEMOGLOBIN g/dL 13 0 10 4* 12 9   HEMATOCRIT % 39 0 32 1* 39 3   MCV fL 95 96 96   PLATELETS Thousands/uL 125* 98* 129*   , BMP/CMP:   Results from last 7 days   Lab Units 08/09/22  0831 08/08/22  0449 08/07/22  1431   SODIUM mmol/L 137 141 138   POTASSIUM mmol/L 3 4* 3 1* 3 7   CHLORIDE mmol/L 100 109* 101   CO2 mmol/L 26 24 25   BUN mg/dL 5 8 11   CREATININE mg/dL 0 54* 0 56* 0 78   CALCIUM mg/dL 9 1 8 4 9 1   AST U/L  --  17 17   ALT U/L  --  17 19   ALK PHOS U/L  --  59 82   EGFR ml/min/1 73sq m 102 101 82   , Vitamin B12:   , HgBA1C:   , TSH:   , Coagulation:   , Lipid Profile:        Imaging Studies: I have personally reviewed pertinent films in PACS and Note as radiology does there is no new acute infarct  EEG, Echo, Pathology, and Other Studies: Her EEG demonstrates as noted above slowing particularly in the left hemisphere given the size of her stroke  There were no frankly epileptic lesions seen  Counseling / Coordination of Care  Total time spent today Approximately 45 minutes  Greater than 50% of total time was spent with the patient and / or family counseling and / or coordination of care  A description of the counseling / coordination of care: All of the above was discussed and reviewed with the patient at the bedside  She had several questions and concerns she is frustrated concerning the loss of power and speech again which were present after her stroke  I have discussed with her that these are likely transient, a phenomena name Mario's paralysis

## 2022-08-09 NOTE — ASSESSMENT & PLAN NOTE
· Patient came in with atrial fibrillation with rapid ventricular rate with a heart rate of 160, possibly precipitated by the seizure Continue anticoagulation with Eliquis and continue oral metoprolol    Blood pressure 118/73, pulse 78, temperature 98 8 °F (37 1 °C), resp  rate 16, SpO2 95 %

## 2022-08-09 NOTE — PROGRESS NOTES
[address] [facility] Progress Note - Damaris Hutchison 1960, 63 y o  female MRN: [id number]  Unit/Bed#: -01 Encounter: [id number]  Primary Care Provider: No primary care provider on file  Date and time admitted to hospital: 8/7/2022  2:23 PM    * New onset seizure [facility])  Assessment & Plan  · Patient came in with witnessed seizure at home  At the time EMS arrived the patient started to wake up but was initially confused and somnolent  · Patient does have past medical history CVA with right-sided weakness and some expressive aphasia  · CT head was normal, MRI brain pending   · Neurology consulted, EEG without epileptiform waves  · Started on Keppra in ER, plan on continuing  · Seizure precautions  · PT/OT consults noted - STR     Aspiration pneumonia ([facility] )  Assessment & Plan  · Concern for aspiration subsequent to the seizure episode  · Given ceftriaxone and fluids in ER - hold on further  · Procal is elevated x1, repeat pending - could be secondary to seizure   · Afebrile, no leukocytosis   · CXR negative    Atrial fibrillation with RVR ([facility] )  Assessment & Plan  · Patient came in with atrial fibrillation with rapid ventricular rate with a heart rate of 160, possibly precipitated by the seizure Continue anticoagulation with Eliquis and continue oral metoprolol    Blood pressure 118/73, pulse 78, temperature 98 8 °F (37 1 °C), resp  rate 16, SpO2 95 %  Hemiplegia ([facility] )  Assessment & Plan  · This is chronic and there is no new weakness  · Physical and occupational therapy to continue    Thrombocytopenia (HCC)  Assessment & Plan  · Decreased from 129 ->98  · Monitor daily CBC, no s/sx bleeding or abnormal bruising   · Etiology unclear        VTE Pharmacologic Prophylaxis: VTE Score: 1 Low Risk (Score 0-2) - Encourage Ambulation  Patient Centered Rounds: I performed bedside rounds with nursing staff today    Discussions with Specialists or Other Care Team Provider: neuro consult noted Education and Discussions with Family / Patient: Updated  (son) via phone  Time Spent for Care: 20 minutes  More than 50% of total time spent on counseling and coordination of care as described above  Current Length of Stay: 2 day(s)  Current Patient Status: Inpatient   Certification Statement: The patient will continue to require additional inpatient hospital stay due to pending MRI brain  Discharge Plan: Anticipate discharge in 24-48 hrs to rehab facility  Code Status: Level 1 - Full Code    Subjective:   Patient seen this am, getting washed up  Denies any seizure activity overnight  Remains agreeable to rehab placement pending MRI results today  Objective:     Vitals:   Temp (24hrs), Av 7 °F (37 1 °C), Min:98 4 °F (36 9 °C), Max:98 8 °F (37 1 °C)    Temp:  [98 4 °F (36 9 °C)-98 8 °F (37 1 °C)] 98 8 °F (37 1 °C)  HR:  [52-78] 78  Resp:  [14-20] 16  BP: ()/(60-74) 118/73  SpO2:  [91 %-95 %] 95 %  There is no height or weight on file to calculate BMI  Input and Output Summary (last 24 hours): Intake/Output Summary (Last 24 hours) at 2022 0814  Last data filed at 2022 2200  Gross per 24 hour   Intake 120 ml   Output 250 ml   Net -130 ml       Physical Exam:   Physical Exam  Vitals and nursing note reviewed  Constitutional:       General: She is not in acute distress  Appearance: She is ill-appearing (chronically)  She is not toxic-appearing  Cardiovascular:      Rate and Rhythm: Normal rate  Pulmonary:      Effort: Pulmonary effort is normal  No respiratory distress  Skin:     Coloration: Skin is not pale  Neurological:      Mental Status: She is alert and oriented to person, place, and time     Psychiatric:         Mood and Affect: Mood normal          Behavior: Behavior normal            Additional Data:     Labs:  Results from last 7 days   Lab Units 22  0449   WBC Thousand/uL 5 63   HEMOGLOBIN g/dL 10 4*   HEMATOCRIT % 32 1* PLATELETS Thousands/uL 98*   NEUTROS PCT % 74   LYMPHS PCT % 18   MONOS PCT % 6   EOS PCT % 1     Results from last 7 days   Lab Units 08/08/22  0449   SODIUM mmol/L 141   POTASSIUM mmol/L 3 1*   CHLORIDE mmol/L 109*   CO2 mmol/L 24   BUN mg/dL 8   CREATININE mg/dL 0 56*   ANION GAP mmol/L 8   CALCIUM mg/dL 8 4   ALBUMIN g/dL 2 8*   TOTAL BILIRUBIN mg/dL 0 69   ALK PHOS U/L 59   ALT U/L 17   AST U/L 17   GLUCOSE RANDOM mg/dL 93         Results from last 7 days   Lab Units 08/07/22  1425   POC GLUCOSE mg/dl 141*         Results from last 7 days   Lab Units 08/07/22  1431   LACTIC ACID mmol/L 1 9   PROCALCITONIN ng/ml 0 81*       Lines/Drains:  Invasive Devices  Report    Peripheral Intravenous Line  Duration           Peripheral IV 08/07/22 Right;Ventral (anterior) Hand 2 days    Peripheral IV 08/07/22 Left Wrist 1 day          Drain  Duration           External Urinary Catheter <1 day                      Imaging: No pertinent imaging reviewed      Recent Cultures (last 7 days):         Last 24 Hours Medication List:   Current Facility-Administered Medications   Medication Dose Route Frequency Provider Last Rate    acetaminophen  650 mg Oral Q6H PRN Checo Ritter MD      aluminum-magnesium hydroxide-simethicone  30 mL Oral Q6H PRN Checo Ritter MD      apixaban  5 mg Oral BID Checo Ritter MD      atorvastatin  40 mg Oral Daily Checo Ritter MD      lactated ringers  75 mL/hr Intravenous Continuous Checo Ritter MD 75 mL/hr (08/07/22 2052)    levETIRAcetam  500 mg Oral Q12H Abimbola Elise MD      magnesium hydroxide  30 mL Oral Daily PRN Checo Ritter MD      metoprolol succinate  25 mg Oral Daily Checo Ritter MD      ondansetron  4 mg Intravenous Q6H PRN Checo Ritter MD      sertraline  50 mg Oral Daily Checo Ritter MD      simethicone  80 mg Oral 4x Daily PRN Checo Ritter MD      thiamine  100 mg Oral Daily Michelle Ramirez MD          Today, Patient Was Seen By: Oilver Rivera PA-C    **Please Note: This note may have been constructed using a voice recognition system  **

## 2022-08-10 LAB
ANION GAP SERPL CALCULATED.3IONS-SCNC: 6 MMOL/L (ref 4–13)
BASOPHILS # BLD AUTO: 0.03 THOUSANDS/ΜL (ref 0–0.1)
BASOPHILS NFR BLD AUTO: 1 % (ref 0–1)
BUN SERPL-MCNC: 10 MG/DL (ref 5–25)
CALCIUM SERPL-MCNC: 9.1 MG/DL (ref 8.3–10.1)
CHLORIDE SERPL-SCNC: 102 MMOL/L (ref 96–108)
CO2 SERPL-SCNC: 28 MMOL/L (ref 21–32)
CREAT SERPL-MCNC: 0.66 MG/DL (ref 0.6–1.3)
EOSINOPHIL # BLD AUTO: 0.16 THOUSAND/ΜL (ref 0–0.61)
EOSINOPHIL NFR BLD AUTO: 3 % (ref 0–6)
ERYTHROCYTE [DISTWIDTH] IN BLOOD BY AUTOMATED COUNT: 12.4 % (ref 11.6–15.1)
FLUAV RNA RESP QL NAA+PROBE: NEGATIVE
FLUBV RNA RESP QL NAA+PROBE: NEGATIVE
GFR SERPL CREATININE-BSD FRML MDRD: 96 ML/MIN/1.73SQ M
GLUCOSE SERPL-MCNC: 103 MG/DL (ref 65–140)
HCT VFR BLD AUTO: 37.2 % (ref 34.8–46.1)
HGB BLD-MCNC: 12.4 G/DL (ref 11.5–15.4)
IMM GRANULOCYTES # BLD AUTO: 0.01 THOUSAND/UL (ref 0–0.2)
IMM GRANULOCYTES NFR BLD AUTO: 0 % (ref 0–2)
LYMPHOCYTES # BLD AUTO: 1.48 THOUSANDS/ΜL (ref 0.6–4.47)
LYMPHOCYTES NFR BLD AUTO: 30 % (ref 14–44)
MCH RBC QN AUTO: 31.9 PG (ref 26.8–34.3)
MCHC RBC AUTO-ENTMCNC: 33.3 G/DL (ref 31.4–37.4)
MCV RBC AUTO: 96 FL (ref 82–98)
MONOCYTES # BLD AUTO: 0.42 THOUSAND/ΜL (ref 0.17–1.22)
MONOCYTES NFR BLD AUTO: 9 % (ref 4–12)
NEUTROPHILS # BLD AUTO: 2.77 THOUSANDS/ΜL (ref 1.85–7.62)
NEUTS SEG NFR BLD AUTO: 57 % (ref 43–75)
NRBC BLD AUTO-RTO: 0 /100 WBCS
PLATELET # BLD AUTO: 116 THOUSANDS/UL (ref 149–390)
PMV BLD AUTO: 11.9 FL (ref 8.9–12.7)
POTASSIUM SERPL-SCNC: 3.5 MMOL/L (ref 3.5–5.3)
PROCALCITONIN SERPL-MCNC: 0.9 NG/ML
RBC # BLD AUTO: 3.89 MILLION/UL (ref 3.81–5.12)
RSV RNA RESP QL NAA+PROBE: NEGATIVE
SARS-COV-2 RNA RESP QL NAA+PROBE: NEGATIVE
SODIUM SERPL-SCNC: 136 MMOL/L (ref 135–147)
WBC # BLD AUTO: 4.87 THOUSAND/UL (ref 4.31–10.16)

## 2022-08-10 PROCEDURE — 99239 HOSP IP/OBS DSCHRG MGMT >30: CPT | Performed by: PHYSICIAN ASSISTANT

## 2022-08-10 PROCEDURE — 0241U HB NFCT DS VIR RESP RNA 4 TRGT: CPT | Performed by: PHYSICIAN ASSISTANT

## 2022-08-10 PROCEDURE — 84145 PROCALCITONIN (PCT): CPT | Performed by: PHYSICIAN ASSISTANT

## 2022-08-10 PROCEDURE — 97167 OT EVAL HIGH COMPLEX 60 MIN: CPT

## 2022-08-10 PROCEDURE — 80048 BASIC METABOLIC PNL TOTAL CA: CPT | Performed by: PHYSICIAN ASSISTANT

## 2022-08-10 PROCEDURE — 85025 COMPLETE CBC W/AUTO DIFF WBC: CPT | Performed by: PHYSICIAN ASSISTANT

## 2022-08-10 RX ORDER — LEVETIRACETAM 500 MG/1
500 TABLET ORAL EVERY 12 HOURS SCHEDULED
Qty: 60 TABLET | Refills: 1 | Status: SHIPPED | OUTPATIENT
Start: 2022-08-10 | End: 2022-08-15 | Stop reason: SDUPTHER

## 2022-08-10 RX ADMIN — ATORVASTATIN CALCIUM 40 MG: 40 TABLET, FILM COATED ORAL at 09:01

## 2022-08-10 RX ADMIN — APIXABAN 5 MG: 5 TABLET, FILM COATED ORAL at 17:35

## 2022-08-10 RX ADMIN — SERTRALINE HYDROCHLORIDE 50 MG: 50 TABLET ORAL at 09:01

## 2022-08-10 RX ADMIN — APIXABAN 5 MG: 5 TABLET, FILM COATED ORAL at 09:01

## 2022-08-10 RX ADMIN — LEVETIRACETAM 500 MG: 500 TABLET, FILM COATED ORAL at 09:01

## 2022-08-10 RX ADMIN — THIAMINE HCL TAB 100 MG 100 MG: 100 TAB at 09:01

## 2022-08-10 RX ADMIN — LEVETIRACETAM 500 MG: 500 TABLET, FILM COATED ORAL at 20:08

## 2022-08-10 RX ADMIN — SODIUM CHLORIDE, SODIUM LACTATE, POTASSIUM CHLORIDE, AND CALCIUM CHLORIDE 75 ML/HR: .6; .31; .03; .02 INJECTION, SOLUTION INTRAVENOUS at 00:11

## 2022-08-10 NOTE — ASSESSMENT & PLAN NOTE
· Concern for aspiration subsequent to the seizure episode  · Given ceftriaxone and fluids in ER - hold on further  · Procal is elevated x2, patient remains afebrile, with no leukocytosis   · CXR negative for infiltrate

## 2022-08-10 NOTE — OCCUPATIONAL THERAPY NOTE
Occupational Therapy Evaluation      Codi Avalos    8/10/2022    Patient Active Problem List   Diagnosis    Acute ischemic left MCA stroke ([facility] )    Hemiplegia (HCC)    Mixed hyperlipidemia    Paroxysmal atrial fibrillation (HCC)    Atrial fibrillation with RVR ([facility] )    New onset seizure ([facility] )    Aspiration pneumonia ([facility] )    Thrombocytopenia ([facility] [address] [facility] )       Past Medical History:   Diagnosis Date    Stroke [facility])        Past Surgical History:   Procedure Laterality Date    OVARY SURGERY          08/10/22 0935   OT Last Visit   OT Visit Date 08/10/22   Note Type   Note type Evaluation   Additional Comments Pt agreeable to OT eval  Upon arrival pt supine in bed with HOB elevated  Restrictions/Precautions   Weight Bearing Precautions Per Order No   Braces or Orthoses   (none per patient)   Other Precautions Multiple lines; Fall Risk;Bed Alarm;Seizure  (Aphasia)   Pain Assessment   Pain Assessment Tool 0-10   Pain Score No Pain   Home Living   Type of [address] [place] [address] One level;Performs ADLs on one level; Able to live on main level with bedroom/bathroom; Access  (0 JUSTIN)   Bathroom Shower/Tub Tub/shower unit   Bathroom Toilet Standard   Bathroom Equipment Grab bars in shower; Shower chair   [facility]  (utilizes quad cane)   Prior Function   Level of [place] Independent with ADLs and functional mobility   Lives With [employer] Help From Family   ADL Assistance Independent   IADLs Needs assistance  (sons do cooking and laundry)   Falls in the last 6 months 0   Vocational On disability   Comments (-) drives, sons assist with transport   Lifestyle   Autonomy Patient reporting being independent with ADLs, ambulatory with quad cane and lives with sons in a one level house   Reciprocal Relationships Supportive sons   Service to Others On disability   ADL   Eating Assistance 5  Supervision/Setup   Grooming Assistance 5  Supervision/Setup   UB Bathing Assistance 4  Minimal Assistance   LB Bathing Assistance 3  Moderate Assistance   UB Dressing Assistance 3  Moderate Assistance   LB Dressing Assistance 3  Moderate Assistance   Toileting Assistance  3  Moderate Assistance   Bed Mobility   Supine to Sit 4  Minimal assistance   Additional items Assist x 1;HOB elevated; Bedrails; Increased time required;Verbal cues;LE management   Sit to Supine   (DNT: pt seated at EOB at end of eval)   Additional Comments Pt denied lightheaded/dizziness with transitional movements   Transfers   Sit to Stand 4  Minimal assistance   Additional items Assist x 1; Increased time required;Verbal cues   Stand to Sit 4  Minimal assistance   Additional items Assist x 1; Increased time required;Verbal cues   Additional Comments Pt requires verbal cues for sequencing and safety  Functional Mobility   Functional Mobility 4  Minimal assistance   Additional Comments Pt ambulated short distance in room with no overt SOB  Pt grossly unsteady and requires cues for safety  Additional items   (Quad cane utilized)   Balance   Static Sitting Good   Dynamic Sitting Fair   Static Standing Fair -   Dynamic Standing Poor +   Activity Tolerance   Activity Tolerance Patient limited by fatigue   Nurse Made Aware Spoke c RN   RUE Assessment   RUE Assessment X  (chronic hemiplegia- pt able to extend/flex fingers and slight movement at elbow)   LUE Assessment   LUE Assessment WFL  (full AROM, 4-/5 MMT)   Hand Function   Gross Motor Coordination Impaired   Fine Motor Coordination Impaired   Sensation   Light Touch No apparent deficits   Vision-Basic Assessment   Current Vision Wears glasses only for reading   Cognition   Overall Cognitive Status [facility]   Arousal/Participation Alert; Responsive; Cooperative   Attention Attends with cues to redirect   Orientation Level Oriented to person  (pt able to handwrite name)   Memory Decreased recall of precautions   Following Commands Follows one step commands with increased time or repetition Comments Cognitive assessment limited due to expressive aphasia  Pt answers yes/no questions appropriately  Will continue to assess cognition  Assessment   Limitation Decreased ADL status; Decreased UE ROM; Decreased UE strength;Decreased endurance;Decreased fine motor control;Decreased self-care trans;Decreased high-level ADLs   Prognosis Good   Assessment Patient is a 63 y o  female seen for OT evaluation s/p admit to [address] [facility]  on 8/7/2022 w/New onset seizure ([address] )  Commorbidities affecting patient's functional performance at time of assessment include: aspiration pneumonia, A-fib, hemiplegia, and thrombocytopenia  Orders placed for OT evaluation and treatment and activity as tolerated   Performed at least two patient identifiers during session including name and wristband  Prior to admission, Patient reporting being independent with ADLs, ambulatory with quad cane and lives with sons in a one level house  Personal factors affecting patient at time of initial evaluation include: difficulty performing ADLs and difficulty performing IADLs  Upon evaluation, patient requires minimal  and moderate assist for UB ADLs, moderate assist for LB ADLs, transfers and functional ambulation in room and bathroom with minimal  assist, with the use of [place]  Patient is oriented to name,  Occupational performance is affected by the following deficits: decreased functional use of BUEs, in hand manipulation deficit with impaired reach, grasp and coordination, limited active ROM, decreased muscle strength, impaired gross motor coordination, impaired fine motor coordination, dynamic sit/ stand balance deficit with poor standing tolerance time for self care and functional mobility, decreased activity tolerance and delayed righting and equilibrium reactions   Patient to benefit from continued Occupational Therapy treatment while in the hospital to address deficits as defined above and maximize level of functional independence with ADLs and functional mobility  Occupational Performance areas to address include: eating, grooming , bathing/ shower, dressing, toilet hygiene, transfer to all surfaces and functional ambulation  From OT standpoint, recommendation at time of d/c would be Post acute rehabilitation services  Goals   Patient Goals no goals related to therapy verbalized at this time   Plan   Treatment Interventions ADL retraining;Functional transfer training;UE strengthening/ROM; Endurance training;Patient/family training;Equipment evaluation/education; Fine motor coordination activities; Compensatory technique education;UE splinting; Activityengagement   Goal Expiration Date 08/30/22   OT Treatment Day 0   OT Frequency 3-5x/wk   Recommendation   OT Discharge Recommendation Post acute rehabilitation services   Additional Comments  At end of eval, pt seated at EOB with PCA in room  Additional Comments 2 The patient's raw score on the AM-PAC Daily Activity inpatient short form is 14, standardized score is 33 39, less than 39 4  Patients at this level are likely to benefit from discharge to post-acute rehabilitation services  Please refer to the recommendation of the Occupational Therapist for safe discharge planning     AM-PAC Daily Activity Inpatient   Lower Body Dressing 2   Bathing 2   Toileting 2   Upper Body Dressing 2   Grooming 3   Eating 3   Daily Activity Raw Score 14   Daily Activity Standardized Score (Calc for Raw Score >=11) 33 39   AM-PAC Applied Cognition Inpatient   Following a Speech/Presentation 4   Understanding Ordinary Conversation 4   Taking Medications 2   Remembering Where Things Are Placed or Put Away 2   Remembering List of 4-5 Errands 3   Taking Care of Complicated Tasks 3   Applied Cognition Raw Score 18   Applied Cognition Standardized Score 38 07     GOALS:    *ADL transfers with (I) for inc'd independence with ADLs/purposeful tasks    *UB ADL with (I) for inc'd independence with self cares    *LB ADL with (S) using AE prn for inc'd independence with self cares    *Toileting with (S) for clothing management and hygiene for return to PLOF with personal care    *Increase stand tolerance x 5  m for inc'd tolerance with standing purposeful tasks    *Participate in 10m UE therex to increase overall stamina/activity tolerance for purposeful tasks    *Bed mobility- (I) for inc'd independence to manage own comfort and initiate EOB & OOB purposeful tasks    *Patient will verbalize 3 safety awareness/ principles to prevent falls in the home setting  *Patient will increase OOB/sitting tolerance to 2-4 hours per day to increase participation in self-care and leisure tasks with no s/s of exertion  *Patient will engage in ongoing cognitive assessment to assist with safe discharge planning/recommendations       JOSE Hernandez, OTR/L

## 2022-08-10 NOTE — ASSESSMENT & PLAN NOTE
· Decreased from 129 ->98 -> 125  · No s/sx bleeding or abnormal bruising   · Etiology unclear, though appears chronic and stable

## 2022-08-10 NOTE — INCIDENTAL FINDINGS
The following findings require follow up:  Radiographic finding   Finding: Abnormality within the left internal auditory canal may represent thickened nerve and dedicated MRI of the IACs with contrast is recommended to exclude acoustic neuroma   Follow up required: MRI with IV contrast    Follow up should be done within 1 month(s)    Please notify the following clinician to assist with the follow up:   Neurology team

## 2022-08-10 NOTE — PLAN OF CARE
Problem: NEUROSENSORY - ADULT  Goal: Remains free of injury related to seizures activity  Description: INTERVENTIONS  - Maintain airway, patient safety  and administer oxygen as ordered  - Monitor patient for seizure activity, document and report duration and description of seizure to physician/advanced practitioner  - If seizure occurs,  ensure patient safety during seizure  - Reorient patient post seizure  - Seizure pads on all 4 side rails  - Instruct patient/family to notify RN of any seizure activity including if an aura is experienced  - Instruct patient/family to call for assistance with activity based on nursing assessment  - Administer anti-seizure medications if ordered    Outcome: Progressing

## 2022-08-10 NOTE — DISCHARGE SUMMARY
[facility] Discharge- Jesse Rezailors 1960, 63 y o  female MRN: [id number]  Unit/Bed#: -01 Encounter: [id number]  Primary Care Provider: No primary care provider on file  Date and time admitted to hospital: 8/7/2022  2:23 PM    * New onset seizure [facility])  Assessment & Plan  · Patient came in with witnessed seizure at home  At the time EMS arrived the patient started to wake up but was initially confused and somnolent  · Patient does have past medical history CVA with right-sided weakness and some expressive aphasia  · CT head was normal, MRI brain negative for acute ischemia  · Neurology consulted, EEG without epileptiform waves  · Started on Keppra in ER, plan on continuing  · Seizure precautions  · PT/OT consults noted - STR     [facility] )  Assessment & Plan  · Concern for aspiration subsequent to the seizure episode  · Given ceftriaxone and fluids in ER - hold on further  · Procal is elevated x2, patient remains afebrile, with no leukocytosis   · CXR negative for infiltrate    Atrial fibrillation with RVR ([facility] )  Assessment & Plan  · Patient came in with atrial fibrillation with rapid ventricular rate with a heart rate of 160, possibly precipitated by the seizure  Continue anticoagulation with Eliquis and continue oral metoprolol      Hemiplegia (HCC)  Assessment & Plan  · This is chronic, 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, expected to resolve   · Physical and occupational therapy to continue    Thrombocytopenia ([facility] )  Assessment & Plan  · Decreased from 129 ->98 -> 125  · No s/sx bleeding or abnormal bruising   · Etiology unclear, though appears chronic and stable         Medical Problems             Resolved Problems  Date Reviewed: 8/10/2022   None               Discharging Physician / Practitioner: Adal Encarnacion PA-C  PCP: No primary care provider on file    Admission Date:   Admission Orders (From admission, onward)     Ordered 08/07/22 420[address] [facility]  Once                      Discharge Date: 08/10/22    Consultations During Hospital Stay:  [facility]  · IP CONSULT TO CASE MANAGEMENT     Procedures Performed:   · EEG   · MRI brain  · CT head     Significant Findings / Test Results:   MRI brain wo contrast   Final Result by Rodrigue Espinoza DO (08/09 1015)      Sequela of previous left MCA territory infarction with encephalomalacia and gliosis  Additional scattered chronic microangiopathic change  No acute ischemia  Abnormality within the left internal auditory canal may represent thickened nerve and dedicated MRI of the IACs with contrast is recommended to exclude acoustic neuroma  See series 6 image 6  This examination was marked "immediate notification" in Epic in order to begin the standard process by which the radiology reading room liaison alerts the referring practitioner  Workstation performed: [id number]         XR chest 1 view portable   Final Result by Shani Jenkins MD (08/08 0533)      No acute cardiopulmonary disease  Workstation performed: [id number]         CT head without contrast   Final Result by London Frank MD (08/07 3902)      No acute intracranial abnormality  Chronic large infarct in left MCA territory with mild chronic microangiopathy  Workstation performed: [id number]         MRI brain IAC wo and w contrast    (Results Pending)        Incidental Findings:   · As above, MRI brain      Test Results Pending at Discharge (will require follow up): · None      Outpatient Tests Requested:  · MRI brain with contrast     Complications:  None     Reason for Admission: seizure     Hospital Course:   Tom Branch is a 63 y o  female patient who originally presented to the hospital on 8/7/2022 due to new onset seizure activity   Patient with a stroke last fall, resulting in right sided weakness and intermittent expressive aphasia  Family witnessed generalized seizure activity at home, followed by period of confusion and lethargy  For this reason admitted to hospital for evaluation  Workup otherwise negative for acute findings, patient stabilized on keppra and stable for discharge home  There was question of possible aspiration, however imaging negative and labs stable off antibiotics  Please see above list of diagnoses and related plan for additional information  Condition at Discharge: good    Discharge Day Visit / Exam:   Subjective:  Patient seen this am, she feels she is back to her baseline since her stroke  She denies new neurologic issues overnight  No additional seizures  No fever  Vitals: Blood Pressure: 103/65 (08/10/22 0739)  Pulse: 57 (08/10/22 0739)  Temperature: 98 6 °F (37 °C) (08/10/22 0739)  Temp Source: Oral (08/09/22 0800)  Respirations: 18 (08/10/22 0739)  SpO2: 93 % (08/10/22 0739)  Exam:   Physical Exam  Vitals and nursing note reviewed  Constitutional:       General: She is not in acute distress  Appearance: She is not toxic-appearing  Cardiovascular:      Rate and Rhythm: Normal rate  Pulmonary:      Effort: Pulmonary effort is normal  No respiratory distress  Breath sounds: No wheezing  Abdominal:      General: Bowel sounds are normal       Palpations: Abdomen is soft  Neurological:      Mental Status: She is alert and oriented to person, place, and time  Mental status is at baseline  Psychiatric:         Mood and Affect: Mood normal          Behavior: Behavior normal            Discussion with Family: Updated  (son) via phone  Discharge instructions/Information to patient and family:   See after visit summary for information provided to patient and family  Provisions for Follow-Up Care:  See after visit summary for information related to follow-up care and any pertinent home health orders         Disposition:   Home with VNA Services (Reminder: Complete face to face encounter)    Planned Readmission: none      Discharge Statement:  I spent >40 minutes discharging the patient  This time was spent on the day of discharge  I had direct contact with the patient on the day of discharge  Greater than 50% of the total time was spent examining patient, answering all patient questions, arranging and discussing plan of care with patient as well as directly providing post-discharge instructions  Additional time then spent on discharge activities  Discharge Medications:  See after visit summary for reconciled discharge medications provided to patient and/or family        **Please Note: This note may have been constructed using a voice recognition system**

## 2022-08-10 NOTE — ASSESSMENT & PLAN NOTE
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

## 2022-08-10 NOTE — CASE MANAGEMENT
Case Management Assessment & Discharge Planning Note    Patient name Yesenia Bowels  Location [address] 306/-71 MRN [id number]  : 1960 Date 8/10/2022       Current Admission Date: 2022  Current Admission Diagnosis:New onset seizure [facility])   Patient Active Problem List    Diagnosis Date Noted    [facility] 20[facility]    Atrial fibrillation with RVR [facility] ) 2022    New onset seizure [facility] ) 2022    Aspiration pneumonia ([facility] ) 2022    Hemiplegia ([facility] ) 10/15/2021    Mixed hyperlipidemia 10/15/2021    Paroxysmal atrial fibrillation ([facility] ) 10/15/2021    Acute ischemic left MCA stroke ([facility] ) 10/14/2021      LOS (days): 3  Geometric Mean LOS (GMLOS) (days): 4 30  Days to GMLOS:1 4     OBJECTIVE:    Risk of Unplanned Readmission Score: 8 7         Current admission status: Inpatient       [facility]erred Pharmacy:   [id number] [address] [address] [facility] [address]  Phone: [phone] Fax: [phone]    Primary Care Provider: No primary care provider on file  Primary Insurance: [address] [place] [address]  Secondary Insurance:     ASSESSMENT:  Active Health Care Proxies    There are no active Health Care Proxies on file         Advance Directives  Does patient have a [address]?: No  Was patient offered paperwork?: Yes (declined)  Does patient have Advance Directives?: No  Was patient offered paperwork?: Yes (declined)  Primary Contact: iván mclean     Readmission Root Cause  30 Day Readmission: No    Patient Information  Admitted from[de-identified] Home  Mental Status: Alert, Other (Comment) (oriented;  d/t CVA, does not always answer appropriately)  During Assessment patient was accompanied by: Not accompanied during assessment  Assessment information provided by[de-identified] Patient, Other - please comment (S/W son on t/c to confirm answers)  Primary Caregiver: Self  Support Systems: Son  [place] of Residence: [place] [address] do you live in?: [facility] pa  Home entry access options   Select all that apply : Stairs  Number of steps to enter home : 5  Do the steps have railings?: Yes  Type of Current Residence: [place]  In the last 12 months, was there a time when you were not able to pay the mortgage or rent on time?: No  In the last 12 months, how many places have you lived?: 1  In the last 12 months, was there a time when you did not have a steady place to sleep or slept in a shelter (including now)?: No  Homeless/housing insecurity resource given?: N/A  Living Arrangements: Lives w/ Son  Is patient a ?: No    Activities of Daily Living Prior to Admission  Completes ADLs independently?: Yes  Ambulates independently?: Yes  Does patient use assisted devices?: Yes  Assisted Devices (DME) used: Straight Cane  Does patient currently own DME?: Yes  What DME does the patient currently own?: Rosio Aviles  Does patient have a history of Outpatient Therapy (PT/OT)?: No  Does the patient have a history of Short-Term Rehab?: Yes ([facility])  Does patient have a history of HHC?: Yes  Does patient currently have [address]?: No     Patient Information Continued  Does patient have prescription coverage?: Yes  Within the past 12 months, you worried that your food would run out before you got the money to buy more : Never true  Within the past 12 months, the food you bought just didn't last and you didn't have money to get more : Never true  Food insecurity resource given?: N/A  Does patient receive dialysis treatments?: No  Does patient have a history of substance abuse?: No  Does patient have a history of Mental Health Diagnosis?: No     Means of Transportation  Means of Transport to Appts[de-identified] Family transport  In the past 12 months, has lack of transportation kept you from medical appointments or from getting medications?: No  In the past 12 months, has lack of transportation kept you from meetings, work, or from getting things needed for daily living?: No  Was application for public transport provided?: N/A    DISCHARGE DETAILS:  Discharge planning discussed with[de-identified] met w pt at bedside;  later spoke w son Chepe Huertas via T/C  Freedom of Choice: Yes  Comments - Freedom of Choice: Met w pt to complete CMA;  w pt's permission, s/w son Chepe Huertas via T/C  Discussed w pt tx team recommendation for STR;  pt reports she is agreeable  Per MD this AM, pt refused placement and wanted [facility]  S/W son;  he says that his mother does not answer questions accurately since her CVA in October of last year  Describes that pt may say "yes" when she means "no";  he asks that CM s/w pt again and be very specific and very repetitive when asking about rehab placement  CM agreeable;  visited pt x 3 but she was sleeping at every visit  SNF referrals and referral to [facility] placed via [address];  awaiting responses  Family to p/u pt at 1400 today  CM contacted family/caregiver?: Yes  Were Treatment Team discharge recommendations reviewed with patient/caregiver?: Yes  Did patient/caregiver verbalize understanding of patient care needs?: Yes  Were patient/caregiver advised of the risks associated with not following Treatment Team discharge recommendations?: Yes    Contacts  Patient Contacts: Loy Chi  Relationship to Patient[de-identified] Family  Contact Method: Phone  Phone Number: [phone]  Reason/Outcome: Continuity of Care, Discharge [address]         Is the patient interested in [place] [address] at discharge?:  (TBD)    DME Referral Provided  Referral made for DME?: No    Other Referral/Resources/Interventions Provided:  Interventions: SNF, [facility]  Referral Comments: Referred to SNFs and [facility];  awaiting responses  Unclear if pt understands d/c plan;  family coming in this afternoon and will discuss w pt again       Treatment Team Recommendation: Short Term Rehab, SNF  Discharge Destination Plan[de-identified] SNF, Short Term Rehab, [facility]

## 2022-08-10 NOTE — PLAN OF CARE
Problem: OCCUPATIONAL THERAPY ADULT  Goal: Performs self-care activities at highest level of function for planned discharge setting  See evaluation for individualized goals  Description: Treatment Interventions: ADL retraining, Functional transfer training, UE strengthening/ROM, Endurance training, Patient/family training, Equipment evaluation/education, Fine motor coordination activities, Compensatory technique education, UE splinting, Activityengagement          See flowsheet documentation for full assessment, interventions and recommendations  Note: Limitation: Decreased ADL status, Decreased UE ROM, Decreased UE strength, Decreased endurance, Decreased fine motor control, Decreased self-care trans, Decreased high-level ADLs  Prognosis: Good  Assessment: Patient is a 63 y o  female seen for OT evaluation s/p admit to [id number] [place] [address]  on 8/7/2022 w/New onset seizure ([facility] )  Commorbidities affecting patient's functional performance at time of assessment include: aspiration pneumonia, A-fib, hemiplegia, and thrombocytopenia  Orders placed for OT evaluation and treatment and activity as tolerated   Performed at least two patient identifiers during session including name and wristband  Prior to admission, Patient reporting being independent with ADLs, ambulatory with quad cane and lives with sons in a one level house  Personal factors affecting patient at time of initial evaluation include: difficulty performing ADLs and difficulty performing IADLs  Upon evaluation, patient requires minimal  and moderate assist for UB ADLs, moderate assist for LB ADLs, transfers and functional ambulation in room and bathroom with minimal  assist, with the use of [place] Anya  Patient is oriented to name,    Occupational performance is affected by the following deficits: decreased functional use of BUEs, in hand manipulation deficit with impaired reach, grasp and coordination, limited active ROM, decreased muscle strength, impaired gross motor coordination, impaired fine motor coordination, dynamic sit/ stand balance deficit with poor standing tolerance time for self care and functional mobility, decreased activity tolerance and delayed righting and equilibrium reactions  Patient to benefit from continued Occupational Therapy treatment while in the hospital to address deficits as defined above and maximize level of functional independence with ADLs and functional mobility  Occupational Performance areas to address include: eating, grooming , bathing/ shower, dressing, toilet hygiene, transfer to all surfaces and functional ambulation  From OT standpoint, recommendation at time of d/c would be Post acute rehabilitation services       OT Discharge Recommendation: Post acute rehabilitation services     Christiana Marin OT

## 2022-08-10 NOTE — CASE MANAGEMENT
Case Management Discharge Planning Note    Patient name Damaris Hutchison  Location [address] 306/-45 MRN [id number]  : 1960 Date 8/10/2022       Current Admission Date: 2022  Current Admission Diagnosis:New onset seizure [facility])   Patient Active Problem List    Diagnosis Date Noted    [facility]    Atrial fibrillation with RVR ([facility] ) 2022    New onset seizure ([facility] ) 2022    Aspiration pneumonia ([facility] ) 2022    Hemiplegia ([facility] ) 10/15/2021    Mixed hyperlipidemia 10/15/2021    Paroxysmal atrial fibrillation ([facility] ) 10/15/2021    Acute ischemic left MCA stroke ([facility] ) 10/14/2021      LOS (days): 3  Geometric Mean LOS (GMLOS) (days): 4 30  Days to GMLOS:1 3     OBJECTIVE:  Risk of Unplanned Readmission Score: 8 7         Current admission status: Inpatient   [facility]   [id number] [place] [address] [place] [address]  Phone: [phone] Fax: [phone]    Primary Care Provider: No primary care provider on file  Primary Insurance: [address] [place] [address]  Secondary Insurance:     DISCHARGE DETAILS:    Discharge planning discussed with[de-identified] Met w pt at two sons at bedside     Comments - Freedom of Choice: Met w pt and sons at bedside to discuss d/c plan  Sons made aware of SNF facilities offering pt a STR bed  Explained to pt again the tx team recommendation for STR/SNF  She expressed understanding and agreement  Son Charlyn Dakin reports pt has been falling at home;  he thinks rehab placement would be good for pt  Son Kendall Mcdonald reported this AM that "she's fine;  she does everything for herself";  now he is agreeable to pt going to rehab "for a short time"  Son Charlyn Dakin prefers [facility] [place];  brother Kendall cMdonald voiced no objection, pt says "I don't care"  Bed accepted at [facility] process initiated    CM contacted family/caregiver?: Yes  Were Treatment Team discharge recommendations reviewed with patient/caregiver?: Yes  Did patient/caregiver verbalize understanding of patient care needs?: Yes  Were patient/caregiver advised of the risks associated with not following Treatment Team discharge recommendations?: Yes    Contacts  Patient Contacts: Maribeth Tiwari  Relationship to Patient[de-identified] Family  Contact Method: In Person  Phone Number: [phone]  Reason/Outcome: Continuity of Care, Discharge [address]         Is the patient interested in [facility] at discharge?: No    DME Referral Provided  Referral made for DME?: No    Other Referral/Resources/Interventions Provided:  Interventions: SNF, Short Term Rehab  Referral Comments: Met adelita cunha at pt's bedside;  all in agreement now for pt placement at STR/SNF  [facility] [place]  Bed accepted;  auth process initiated           Treatment Team Recommendation: Short Term Rehab, SNF  Discharge Destination Plan[de-identified] SNF, Short Term Rehab  Transport at Discharge : BLS Ambulance, Other (Comment) (pending medical necessity)

## 2022-08-10 NOTE — QUICK NOTE
MRI brain revealed "abnormalitiy within the left internal auditory canal, may represent thickened nerve and dedicated MRI of the IACS with contrast is recommended to exclude acoustic neuroma "    Outpatient MRI IAC w/wo contrast has been ordered  Discussed with attending Neurologist and SLIM  No further inpatient Neuro recommendations  Codi Avalos will need follow up in 6-8 weeks with epilepsy attending or AP  Can also follow up with general attending or AP  Benito Christianson She will require a MRI IAC outpatient

## 2022-08-10 NOTE — ASSESSMENT & PLAN NOTE
· Patient came in with atrial fibrillation with rapid ventricular rate with a heart rate of 160, possibly precipitated by the seizure   Continue anticoagulation with Eliquis and continue oral metoprolol

## 2022-08-10 NOTE — CASE MANAGEMENT
Case Management Progress Note    Patient name Paul Viera  Location [address] 306/-05 MRN [id number]  : 1960 Date 8/10/2022       LOS (days): 3  Geometric Mean LOS (GMLOS) (days): 4 30  Days to GMLOS:1 3        OBJECTIVE:        Current admission status: Inpatient  Preferred Pharmacy:   [id number] [place] [place] - [address] [address]  Phone: [phone] Fax: [phone]    Primary Care Provider: No primary care provider on file  Primary Insurance: Farrukh Castillo  Secondary Insurance:     PROGRESS NOTE:  PRISCILLA Dias and pt's nurse Olsen made aware of change in d/c plan

## 2022-08-10 NOTE — ASSESSMENT & PLAN NOTE
· Patient came in with witnessed seizure at home    At the time EMS arrived the patient started to wake up but was initially confused and somnolent  · Patient does have past medical history CVA with right-sided weakness and some expressive aphasia  · CT head was normal, MRI brain negative for acute ischemia  · Neurology consulted, EEG without epileptiform waves  · Started on Keppra in ER, plan on continuing  · Seizure precautions  · PT/OT consults noted - STR

## 2022-08-10 NOTE — CASE MANAGEMENT
Case Management Assessment & Discharge Planning Note    Patient name Lizy Esquivel  Location [address] [phone]/-[id number] MRN [id number]  : 1960 Date 8/10/2022       Current Admission Date: 2022  Current Admission Diagnosis:New onset seizure [facility])   Patient Active Problem List    Diagnosis Date Noted    Thrombocytopenia ([facility] [address] ) 2022    Atrial fibrillation with RVR ([facility] ) 2022    New onset seizure ([facility] ) 2022    Aspiration pneumonia ([facility] [address] ) 2022    Hemiplegia ([facility] ) 10/15/2021    Mixed hyperlipidemia 10/15/2021    Paroxysmal atrial fibrillation ([facility] ) 10/15/2021    Acute ischemic left MCA stroke ([facility] ) 10/14/2021      LOS (days): 3  Geometric Mean LOS (GMLOS) (days): 4 30  Days to GMLOS:1 4     OBJECTIVE:    Risk of Unplanned Readmission Score: 8 7         Current admission status: Inpatient       Preferred Pharmacy:   [id number] [address] [address]  Phone: [phone] Fax: [phone]    Primary Care Provider: No primary care provider on file  Primary Insurance: Andrea Qureshi  Secondary Insurance:     ASSESSMENT:  Active Health Care Proxies    There are no active Health Care Proxies on file         Advance Directives  Does patient have a [address]?: No  Was patient offered paperwork?: Yes (declined)  Does patient have Advance Directives?: No  Was patient offered paperwork?: Yes (declined)  Primary Contact: iván mclean     Readmission Root Cause  30 Day Readmission: No    Patient Information  Admitted from[de-identified] Home  Mental Status: Alert, Other (Comment) (oriented;  d/t CVA, does not always answer appropriately)  During Assessment patient was accompanied by: Not accompanied during assessment  Assessment information provided by[de-identified] Patient, Other - please comment (S/W son on t/c to confirm answers)  Primary Caregiver: Self  Support Systems: Son  [place] of Residence: [place] [address] do you live in?: [place]  Home entry access options   Select all that apply : Stairs  Number of steps to enter home : 5  Do the steps have railings?: Yes  Type of Current Residence: [place]  In the last 12 months, was there a time when you were not able to pay the mortgage or rent on time?: No  In the last 12 months, how many places have you lived?: 1  In the last 12 months, was there a time when you did not have a steady place to sleep or slept in a shelter (including now)?: No  Homeless/housing insecurity resource given?: N/A  Living Arrangements: Lives w/ Son  Is patient a ?: No    Activities of Daily Living Prior to Admission  Completes ADLs independently?: Yes  Ambulates independently?: Yes  Does patient use assisted devices?: Yes  Assisted Devices (DME) used: Straight Cane  Does patient currently own DME?: Yes  What DME does the patient currently own?: Carolyne Bryan  Does patient have a history of Outpatient Therapy (PT/OT)?: No  Does the patient have a history of Short-Term Rehab?: Yes ([facility])  Does patient have a history of HHC?: Yes  Does patient currently have [facility]?: No     Patient Information Continued  Does patient have prescription coverage?: Yes  Within the past 12 months, you worried that your food would run out before you got the money to buy more : Never true  Within the past 12 months, the food you bought just didn't last and you didn't have money to get more : Never true  Food insecurity resource given?: N/A  Does patient receive dialysis treatments?: No  Does patient have a history of substance abuse?: No  Does patient have a history of Mental Health Diagnosis?: No     Means of Transportation  Means of Transport to Appts[de-identified] Family transport  In the past 12 months, has lack of transportation kept you from medical appointments or from getting medications?: No  In the past 12 months, has lack of transportation kept you from meetings, work, or from getting things needed for daily living?: No  Was application for public transport provided?: N/A    DISCHARGE DETAILS:  Discharge planning discussed with[de-identified] met w pt at bedside;  later spoke w son Damaris Record via T/C  Freedom of Choice: Yes  Comments - Freedom of Choice: Met w pt to complete CMA;  w pt's permission, s/w son Damaris Record via T/C  Discussed w pt tx team recommendation for STR;  pt reports she is agreeable  Per MD this AM, pt refused placement and wanted to go home w home health/[facility]  S/W son;  he says that his mother does not answer questions accurately since her CVA in October of last year  Describes that pt may say "yes" when she means "no";  he asks that CM s/w pt again and be very specific and very repetitive when asking about rehab placement  CM agreeable;  visited pt x 3 but she was sleeping at every visit  SNF referrals and referral to [facility] placed via Anne-Marie Lemus;  awaiting responses  Family to p/u pt at 1400 today  CM contacted family/caregiver?: Yes  Were Treatment Team discharge recommendations reviewed with patient/caregiver?: Yes  Did patient/caregiver verbalize understanding of patient care needs?: Yes  Were patient/caregiver advised of the risks associated with not following Treatment Team discharge recommendations?: Yes    Contacts  Patient Contacts: Eleonora Primrose  Relationship to Patient[de-identified] Family  Contact Method: Phone  Phone Number: [phone]  Reason/Outcome: Continuity of Care, Discharge [address]         Is the patient interested in [facility] at discharge?:  (TBD)    DME Referral Provided  Referral made for DME?: No    Other Referral/Resources/Interventions Provided:  Interventions: SNF, [facility]  Referral Comments: Referred to SNFs and [facility];  awaiting responses  Unclear if pt understands d/c plan;  family coming in this afternoon and will discuss w pt again       Treatment Team Recommendation: Short Term Rehab, SNF  Discharge Destination Plan[de-identified] SNF, Short Term Rehab, [facility]

## 2022-08-11 PROCEDURE — 97110 THERAPEUTIC EXERCISES: CPT

## 2022-08-11 PROCEDURE — 99239 HOSP IP/OBS DSCHRG MGMT >30: CPT | Performed by: INTERNAL MEDICINE

## 2022-08-11 PROCEDURE — 99232 SBSQ HOSP IP/OBS MODERATE 35: CPT | Performed by: PHYSICIAN ASSISTANT

## 2022-08-11 PROCEDURE — 97116 GAIT TRAINING THERAPY: CPT

## 2022-08-11 RX ADMIN — THIAMINE HCL TAB 100 MG 100 MG: 100 TAB at 09:59

## 2022-08-11 RX ADMIN — ATORVASTATIN CALCIUM 40 MG: 40 TABLET, FILM COATED ORAL at 09:59

## 2022-08-11 RX ADMIN — LEVETIRACETAM 500 MG: 500 TABLET, FILM COATED ORAL at 20:15

## 2022-08-11 RX ADMIN — APIXABAN 5 MG: 5 TABLET, FILM COATED ORAL at 18:23

## 2022-08-11 RX ADMIN — LEVETIRACETAM 500 MG: 500 TABLET, FILM COATED ORAL at 09:59

## 2022-08-11 RX ADMIN — SERTRALINE HYDROCHLORIDE 50 MG: 50 TABLET ORAL at 09:59

## 2022-08-11 RX ADMIN — APIXABAN 5 MG: 5 TABLET, FILM COATED ORAL at 09:59

## 2022-08-11 NOTE — PLAN OF CARE
Problem: PHYSICAL THERAPY ADULT  Goal: Performs mobility at highest level of function for planned discharge setting  See evaluation for individualized goals  Description: Treatment/Interventions: Functional transfer training, LE strengthening/ROM, Therapeutic exercise, Endurance training, Patient/family training, Bed mobility, Gait training, OT, Spoke to nursing          See flowsheet documentation for full assessment, interventions and recommendations  8/11/2022 1130 by Esperanza Wise PT  Note: Prognosis: Good  Problem List: Decreased strength, Decreased endurance, Impaired balance, Decreased mobility, Decreased coordination  Assessment: Chart reviewed  Patient was received supine in bed in NAD and agreeable to PT session  Today's PT treatment session consisted of therapeutic activity for facilitation of transitional movements and safe performance of correct technique for bed mobility and sit to stand transfers, therapeutic exercise to increase lower extremity muscle strength, and gait training to promote safe and functional ambulation on level surfaces  In comparison to the previous session the patient has made progress as evident by requiring decreased assistance with sit to stand transfers, decreased assistance with ambulation, and ability to ambulate an increased distance  Pt ambulated with use of quad cane; when ambulating pt exhibits decreased right stance time, decreased right step length, decreased heel strike, and narrow base of support  Pt able to tolerate all therapeutic exercise well without complaints  Overall, patient tolerated today's session well and continues to be making progress towards achieving her STG's  Patient's prognosis for achieving their STG's is good as evident by pt's motivation   PT intervention continues to be appropriate as the patient continues to be limited by decreased lower extremity strength, impaired balance, decreased endurance, gait deviations, and decreased functional mobility  Continue to recommend STR  PT to continue to see patient in order to address the deficits listed above and provide interventions consistent with the POC in order to achieve STG's and optimize the patient's independence with functional mobility  Barriers to Discharge: Inaccessible home environment     PT Discharge Recommendation: Post acute rehabilitation services    See flowsheet documentation for full assessment

## 2022-08-11 NOTE — DISCHARGE SUMMARY
[address] [facility]  Discharge- Melodie Veloz 1960, 63 y o  female MRN: [id number]  Unit/Bed#: -01 Encounter: [id number]  Primary Care Provider: No primary care provider on file  Date and time admitted to hospital: 8/7/2022  2:23 PM    * New onset seizure [facility])  Assessment & Plan  · Patient came in with witnessed seizure at home  At the time EMS arrived the patient started to wake up but was initially confused and somnolent  · Patient does have past medical history CVA with right-sided weakness and some expressive aphasia  · CT head was normal, MRI brain negative for acute ischemia  · Neurology consulted, EEG without epileptiform waves  · Continue Keppra  · Seizure precautions  · PT/OT consults noted - STR recommended  · Plan to discharge to rehab    Thrombocytopenia [facility])  Assessment & Plan  · No s/sx bleeding or abnormal bruising   · Etiology unclear, though appears chronic and stable     Aspiration pneumonia ([facility] [address] )  Assessment & Plan  · Concern for aspiration subsequent to the seizure episode  · Given ceftriaxone and fluids in ER - hold on further  · Procal is improving without intervention  · Patient remains afebrile, with no leukocytosis   · CXR negative for infiltrate    Atrial fibrillation with RVR ([facility] [address] )  Assessment & Plan  · Patient came in with atrial fibrillation with rapid ventricular rate with a heart rate of 160, possibly precipitated by the seizure  · Continue anticoagulation with Eliquis and continue oral metoprolol  · Heart rate controlled 50s to 60s    Hemiplegia ([address] )  Assessment & Plan  · This is chronic, 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, expected to resolve   · Physical and occupational therapy to continue      Medical Problems             Resolved Problems  Date Reviewed: 8/14/2022   None               Discharging Physician / Practitioner: Eloisa Angelucci, DO  PCP: No primary care provider on file    Admission Date:   Admission Orders (From admission, onward)     Ordered        08/07/22 13918 [facility]  Once                      Discharge Date: 08/15/22    Consultations During Hospital Stay:  · Neurology    Complications:  none    Reason for Admission:  Seizure-like activity    Hospital Course:   Damaris Hutchison is a 63 y o  female patient who originally presented to the hospital on 8/7/2022 due to seizure-like activity  Patient was seen by Neurology team during hospitalization and started on anti epileptic medicati complaints on exam   on   Patient also had MRI which did not show any acute strokes but did show abnormality within left internal auditory canal   Patient will need outpatient MRI to follow-up with primary care provider and Neurology  Patient was seen by PT and OT who recommended rehab and patient will be discharged to rehab at this time  Patient had no further questions on exam     Please see above list of diagnoses and related plan for additional information  Condition at Discharge: stable    Discharge Day Visit / Exam:   Subjective:  Patient resting comfortably on examination  Patient had no overnight events or  Vitals: Blood Pressure: 99/66 (08/15/22 1456)  Pulse: (!) 47 (08/15/22 1456)  Temperature: 98 3 °F (36 8 °C) (08/15/22 1456)  Temp Source: Oral (08/09/22 0800)  Respirations: 18 (08/14/22 2137)  Height: 5' 8" (172 7 cm) (08/12/22 2300)  Weight - Scale: 66 5 kg (146 lb 9 7 oz) (08/12/22 2300)  SpO2: 93 % (08/15/22 1456)  Exam:   Physical Exam  Vitals and nursing note reviewed  Constitutional:       General: She is not in acute distress  Appearance: She is well-developed  HENT:      Head: Normocephalic and atraumatic  Eyes:      General: No scleral icterus  Conjunctiva/sclera: Conjunctivae normal    Cardiovascular:      Rate and Rhythm: Normal rate and regular rhythm  Heart sounds: Normal heart sounds  No murmur heard  No friction rub  No gallop     Pulmonary:      Effort: Pulmonary effort is normal  No respiratory distress  Breath sounds: Normal breath sounds  No wheezing or rales  Abdominal:      General: Bowel sounds are normal  There is no distension  Palpations: Abdomen is soft  Tenderness: There is no abdominal tenderness  Musculoskeletal:         General: Normal range of motion  Skin:     General: Skin is warm  Findings: No rash  Neurological:      Mental Status: She is alert and oriented to person, place, and time  Discussion with Family: Patient declined call to   Discharge instructions/Information to patient and family:   See after visit summary for information provided to patient and family  Provisions for Follow-Up Care:  See after visit summary for information related to follow-up care and any pertinent home health orders  Disposition:   Acute Rehab at rehab    Planned Readmission: none     Discharge Statement:  I spent 35 minutes discharging the patient  This time was spent on the day of discharge  I had direct contact with the patient on the day of discharge  Greater than 50% of the total time was spent examining patient, answering all patient questions, arranging and discussing plan of care with patient as well as directly providing post-discharge instructions  Additional time then spent on discharge activities  Discharge Medications:  See after visit summary for reconciled discharge medications provided to patient and/or family        **Please Note: This note may have been constructed using a voice recognition system**

## 2022-08-11 NOTE — ASSESSMENT & PLAN NOTE
· Patient came in with witnessed seizure at home    At the time EMS arrived the patient started to wake up but was initially confused and somnolent  · Patient does have past medical history CVA with right-sided weakness and some expressive aphasia  · CT head was normal, MRI brain negative for acute ischemia  · Neurology consulted, EEG without epileptiform waves  · Continue Keppra  · Seizure precautions  · PT/OT consults noted - STR recommended  · Patient discharged to home per request 8/10, however now agreeable to STR  · Pending authorization

## 2022-08-11 NOTE — PHYSICAL THERAPY NOTE
Physical Therapy Treatment Note    Patient Name: Ac Grewal    Diagnosis: Atrial fibrillation ([facility] [address] ) [I48 91]  Atrial fibrillation with rapid ventricular response ([address] ) [I48 91]  New onset seizure ([facility] [address] ) [R56 9]  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, unspecified hemiplegia type ([facility] [address] ) [I69 351]     08/11/22 0932   PT Last Visit   PT Visit Date 08/11/22   Note Type   Note Type Treatment   Pain Assessment   Pain Assessment Tool 0-10   Pain Score No Pain   Restrictions/Precautions   Weight Bearing Precautions Per Order No   Other Precautions Chair Alarm; Bed Alarm; Fall Risk;Seizure  (expressive aphasia)   General   Chart Reviewed Yes   Response to Previous Treatment Patient with no complaints from previous session  Family/Caregiver Present No   Cognition   Overall Cognitive Status WFL   Arousal/Participation Alert; Responsive; Cooperative   Attention Attends with cues to redirect   Orientation Level Oriented X4  (when asked yes/no questions)   Memory Decreased recall of recent events   Following Commands Follows one step commands without difficulty   Comments Pt agreeable to PT  Benefits from yes/no questions   Subjective   Subjective "I'm fine "   Bed Mobility   Supine to Sit 4  Minimal assistance   Additional items Assist x 1;HOB elevated; Increased time required;Verbal cues;LE management   Transfers   Sit to Stand 4  Minimal assistance  (CG assist)   Additional items Assist x 1; Increased time required;Verbal cues   Stand to Sit 4  Minimal assistance  (CG assist)   Additional items Assist x 1; Armrests; Increased time required;Verbal cues   Ambulation/Elevation   Gait pattern Decreased toe off;Decreased heel strike;Decreased hip extension; Excessively slow; Step to;Short stride; Shuffling;Decreased R stance;Narrow ELENI   Gait Assistance 4  Minimal assist   Additional items Assist x 1;Verbal cues   Assistive Device Small base quad cane   Distance 30 feet   Stair Management Assistance Not tested   Balance   Static Sitting Fair +   Dynamic Sitting Fair   Static Standing Fair -   Dynamic Standing Poor +   Ambulatory Poor +   Endurance Deficit   Endurance Deficit Yes   Endurance Deficit Description decreased activity tolerance   Activity Tolerance   Activity Tolerance Patient tolerated treatment well   Nurse Made Aware Discussed case with RN   Exercises   Quad Sets Sitting;20 reps;AROM; Bilateral  (long sitting)   Heelslides Supine;20 reps;AROM; Bilateral   Glute Sets Supine;20 reps;AROM; Bilateral   Hip Flexion Sitting;20 reps;AROM; Bilateral   Hip Abduction Sitting;20 reps;AROM; Bilateral  (long sitting)   Hip Adduction Sitting;20 reps;AROM; Bilateral   Knee AROM Long Arc Quad Sitting;20 reps;AROM; Bilateral   Ankle Pumps Supine;20 reps;AROM; Bilateral   Assessment   Prognosis Good   Problem List Decreased strength;Decreased endurance; Impaired balance;Decreased mobility; Decreased coordination   Assessment Chart reviewed  Patient was received supine in bed in NAD and agreeable to PT session  Today's PT treatment session consisted of therapeutic activity for facilitation of transitional movements and safe performance of correct technique for bed mobility and sit to stand transfers, therapeutic exercise to increase lower extremity muscle strength, and gait training to promote safe and functional ambulation on level surfaces  In comparison to the previous session the patient has made progress as evident by requiring decreased assistance with sit to stand transfers, decreased assistance with ambulation, and ability to ambulate an increased distance  Pt ambulated with use of quad cane; when ambulating pt exhibits decreased right stance time, decreased right step length, decreased heel strike, and narrow base of support  Pt able to tolerate all therapeutic exercise well without complaints  Overall, patient tolerated today's session well and continues to be making progress towards achieving her STG's   Patient's prognosis for achieving their STG's is good as evident by pt's motivation  PT intervention continues to be appropriate as the patient continues to be limited by decreased lower extremity strength, impaired balance, decreased endurance, gait deviations, and decreased functional mobility  Continue to recommend STR  PT to continue to see patient in order to address the deficits listed above and provide interventions consistent with the POC in order to achieve STG's and optimize the patient's independence with functional mobility  Barriers to Discharge Inaccessible home environment   Goals   STG Expiration Date 08/18/22   Plan   Treatment/Interventions Functional transfer training;LE strengthening/ROM; Therapeutic exercise; Endurance training;Patient/family training;Bed mobility;Gait training;OT;Spoke to nursing   Progress Progressing toward goals   PT Frequency 3-5x/wk   Recommendation   PT Discharge Recommendation Post acute rehabilitation services   AM-PAC Basic Mobility Inpatient   Turning in Bed Without Bedrails 3   Lying on Back to Sitting on Edge of Flat Bed 3   Moving Bed to Chair 3   Standing Up From Chair 3   Walk in Room 3   Climb 3-5 Stairs 2   Basic Mobility Inpatient Raw Score 17   Basic Mobility Standardized Score 39 67   Highest Level Of Mobility   JH-HLM Goal 5: Stand one or more mins   JH-HLM Achieved 7: Walk 25 feet or more   Education   Education Provided Mobility training;Home exercise program;Assistive device   Patient Reinforcement needed   End of Consult   Patient Position at End of Consult Bedside chair;Bed/Chair alarm activated; All needs within reach  (RN aware)     Martínez Nicole PT, DPT    Time of PT treatment session: [id number]  23 minutes

## 2022-08-11 NOTE — ASSESSMENT & PLAN NOTE
· Concern for aspiration subsequent to the seizure episode  · Given ceftriaxone and fluids in ER - hold on further  · Procal is improving without intervention  · Patient remains afebrile, with no leukocytosis   · CXR negative for infiltrate

## 2022-08-11 NOTE — PROGRESS NOTES
[facility] Progress Note - Denver Arellano 1960, 63 y o  female MRN: [id number]  Unit/Bed#: -01 Encounter: [id number]  Primary Care Provider: No primary care provider on file  Date and time admitted to hospital: 8/7/2022  2:23 PM    * New onset seizure [facility])  Assessment & Plan  · Patient came in with witnessed seizure at home  At the time EMS arrived the patient started to wake up but was initially confused and somnolent  · Patient does have past medical history CVA with right-sided weakness and some expressive aphasia  · CT head was normal, MRI brain negative for acute ischemia  · Neurology consulted, EEG without epileptiform waves  · Continue Keppra  · Seizure precautions  · PT/OT consults noted - STR recommended  · Patient discharged to home per request 8/10, however now agreeable to STR  · Pending authorization    Aspiration pneumonia ([facility] )  Assessment & Plan  · Concern for aspiration subsequent to the seizure episode  · Given ceftriaxone and fluids in ER - hold on further  · Procal is improving without intervention  · Patient remains afebrile, with no leukocytosis   · CXR negative for infiltrate    Atrial fibrillation with RVR ([facility] )  Assessment & Plan  · Patient came in with atrial fibrillation with rapid ventricular rate with a heart rate of 160, possibly precipitated by the seizure  Continue anticoagulation with Eliquis and continue oral metoprolol    Thrombocytopenia (HCC)  Assessment & Plan  · No s/sx bleeding or abnormal bruising   · Etiology unclear, though appears chronic and stable     Hemiplegia (HCC)  Assessment & Plan  · This is chronic, 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, expected to resolve   · Physical and occupational therapy to continue          VTE Pharmacologic Prophylaxis: VTE Score: 1 Low Risk (Score 0-2) - Encourage Ambulation  Patient Centered Rounds: I performed bedside rounds with nursing staff today    Discussions with Specialists or Other Care Team Provider: SLP     Education and Discussions with Family / Patient: Patient declined call to   Time Spent for Care: 15 minutes  More than 50% of total time spent on counseling and coordination of care as described above  Current Length of Stay: 4 day(s)  Current Patient Status: Inpatient   Certification Statement: The patient will continue to require additional inpatient hospital stay due to pending STR/auth  Discharge Plan: Anticipate discharge later today or tomorrow to rehab facility  Code Status: Level 1 - Full Code    Subjective:   Patient seen this am, reports the aphasia is stable but she does feel it's more than her baseline  She is agreeable to STR placement and is agreeable to SLP evaluation today  Objective:     Vitals:   Temp (24hrs), Av 5 °F (36 9 °C), Min:98 1 °F (36 7 °C), Max:98 7 °F (37 1 °C)    Temp:  [98 1 °F (36 7 °C)-98 7 °F (37 1 °C)] 98 4 °F (36 9 °C)  HR:  [56-58] 56  Resp:  [18] 18  BP: ()/(61-65) 99/61  SpO2:  [93 %-94 %] 93 %  There is no height or weight on file to calculate BMI  Input and Output Summary (last 24 hours): Intake/Output Summary (Last 24 hours) at 2022 0726  Last data filed at 8/10/2022 2001  Gross per 24 hour   Intake 420 ml   Output 150 ml   Net 270 ml       Physical Exam:   Physical Exam  Vitals and nursing note reviewed  Constitutional:       General: She is not in acute distress  Appearance: She is not ill-appearing or toxic-appearing  Cardiovascular:      Rate and Rhythm: Normal rate  Pulmonary:      Effort: Pulmonary effort is normal  No respiratory distress  Neurological:      Mental Status: She is alert        Comments: Expressive aphasia remains present, however receptive capabilities appear normal            Additional Data:     Labs:  Results from last 7 days   Lab Units 08/10/22  0811   WBC Thousand/uL 4 87   HEMOGLOBIN g/dL 12 4   HEMATOCRIT % 37 2   PLATELETS Thousands/uL 116*   NEUTROS PCT % 57   LYMPHS PCT % 30   MONOS PCT % 9   EOS PCT % 3     Results from last 7 days   Lab Units 08/10/22  0811 08/09/22  0831 08/08/22  0449   SODIUM mmol/L 136   < > 141   POTASSIUM mmol/L 3 5   < > 3 1*   CHLORIDE mmol/L 102   < > 109*   CO2 mmol/L 28   < > 24   BUN mg/dL 10   < > 8   CREATININE mg/dL 0 66   < > 0 56*   ANION GAP mmol/L 6   < > 8   CALCIUM mg/dL 9 1   < > 8 4   ALBUMIN g/dL  --   --  2 8*   TOTAL BILIRUBIN mg/dL  --   --  0 69   ALK PHOS U/L  --   --  59   ALT U/L  --   --  17   AST U/L  --   --  17   GLUCOSE RANDOM mg/dL 103   < > 93    < > = values in this interval not displayed  Results from last 7 days   Lab Units 08/07/22  1425   POC GLUCOSE mg/dl 141*         Results from last 7 days   Lab Units 08/10/22  0811 08/09/22  0831 08/07/22  1431   LACTIC ACID mmol/L  --   --  1 9   PROCALCITONIN ng/ml 0 90* 1 36* 0 81*       Lines/Drains:  Invasive Devices  Report    Peripheral Intravenous Line  Duration           Peripheral IV 08/07/22 Right;Ventral (anterior) Hand 4 days    Peripheral IV 08/07/22 Left Wrist 3 days          Drain  Duration           External Urinary Catheter 2 days                      Imaging: No pertinent imaging reviewed      Recent Cultures (last 7 days):         Last 24 Hours Medication List:   Current Facility-Administered Medications   Medication Dose Route Frequency Provider Last Rate    acetaminophen  650 mg Oral Q6H PRN Judy Sebastian MD      aluminum-magnesium hydroxide-simethicone  30 mL Oral Q6H PRN Judy Sebastian MD      apixaban  5 mg Oral BID Judy Sebastian MD      atorvastatin  40 mg Oral Daily Judy Sebastian MD      levETIRAcetam  500 mg Oral Q12H Socorro Alex MD      magnesium hydroxide  30 mL Oral Daily PRN Judy Sebastian MD      metoprolol succinate  25 mg Oral Daily Judy Sebastian MD      ondansetron  4 mg Intravenous Q6H PRN Judy Sebastian MD     [facility] sertraline  50 mg Oral Daily Michelle Ramirez MD      simethicone  80 mg Oral 4x Daily PRN Michelle Ramirez MD      thiamine  100 mg Oral Daily Michelle Ramirez MD          Today, Patient Was Seen By: Oliver Rivera PA-C    **Please Note: This note may have been constructed using a voice recognition system  **

## 2022-08-11 NOTE — CASE MANAGEMENT
Case Management Discharge Planning Note    Patient name Jalen Cargo  Location [address] 306/-99 MRN [id number]  : 1960 Date 2022       Current Admission Date: 2022  Current Admission Diagnosis:New onset seizure [facility])   Patient Active Problem List    Diagnosis Date Noted    Thrombocytopenia ([facility] ) 2022    Atrial fibrillation with RVR ([facility] ) 2022    New onset seizure ([facility] ) 2022    Aspiration pneumonia ([facility] ) 2022    Hemiplegia ([facility] ) 10/15/2021    Mixed hyperlipidemia 10/15/2021    Paroxysmal atrial fibrillation ([facility] ) 10/15/2021    Acute ischemic left MCA stroke ([facility] ) 10/14/2021      LOS (days): 4  Geometric Mean LOS (GMLOS) (days): 4 30  Days to GMLOS:0 5     OBJECTIVE:  Risk of Unplanned Readmission Score: 8 71         Current admission status: Inpatient   Preferred Pharmacy:   [id number] [place], [place] - [address]  [address] [address]  Phone: [phone] Fax: [phone]    Primary Care Provider: No primary care provider on file  Primary Insurance: [address] [place] [address]  Secondary Insurance:     DISCHARGE DETAILS:                                                                                                               Facility Insurance Auth Number: submitted SNF auth request to [address] [place] [address] for [place] [facility]  NPI: [id number]  Dr Ricardo Mcbride  NPI:  [id number]   Clinicals faxed to [phone]

## 2022-08-11 NOTE — NURSING NOTE
During chart review this RN noticed yesterdays hypotension  During further chart review it was noticed patient received oral Metoprolol with out a blood pressure  SLIM notified   Patient no longer symptomatic

## 2022-08-11 NOTE — CASE MANAGEMENT
Case Management Progress Note    Patient name Wai Henning  Location [address] 306/-21 MRN [id number]  : 1960 Date 2022       LOS (days): 4  Geometric Mean LOS (GMLOS) (days): 4 30  Days to GMLOS:0 2        OBJECTIVE:        Current admission status: Inpatient  Preferred Pharmacy:   [id number] [address]  [address]  [facility] [address]  Phone: [phone] Fax: [phone]    Primary Care Provider: No primary care provider on file  Primary Insurance: [address] [place] [address]  Secondary Insurance:     PROGRESS NOTE:  Awaiting insurance auth from [facility] Discharge Support  For placement at [facility]

## 2022-08-12 PROCEDURE — 99232 SBSQ HOSP IP/OBS MODERATE 35: CPT | Performed by: NURSE PRACTITIONER

## 2022-08-12 PROCEDURE — 97116 GAIT TRAINING THERAPY: CPT

## 2022-08-12 PROCEDURE — 97110 THERAPEUTIC EXERCISES: CPT

## 2022-08-12 RX ADMIN — THIAMINE HCL TAB 100 MG 100 MG: 100 TAB at 09:52

## 2022-08-12 RX ADMIN — ATORVASTATIN CALCIUM 40 MG: 40 TABLET, FILM COATED ORAL at 09:52

## 2022-08-12 RX ADMIN — APIXABAN 5 MG: 5 TABLET, FILM COATED ORAL at 09:52

## 2022-08-12 RX ADMIN — SERTRALINE HYDROCHLORIDE 50 MG: 50 TABLET ORAL at 09:52

## 2022-08-12 RX ADMIN — LEVETIRACETAM 500 MG: 500 TABLET, FILM COATED ORAL at 20:13

## 2022-08-12 RX ADMIN — LEVETIRACETAM 500 MG: 500 TABLET, FILM COATED ORAL at 09:52

## 2022-08-12 RX ADMIN — APIXABAN 5 MG: 5 TABLET, FILM COATED ORAL at 18:23

## 2022-08-12 NOTE — PHYSICAL THERAPY NOTE
Physical Therapy Treatment Note    Patient Name: Bertrand Patel    Diagnosis: Atrial fibrillation ([facility] ) [I48 91]  Atrial fibrillation with rapid ventricular response ([address] ) [I48 91]  New onset seizure ([facility] ) [R56 9]  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, unspecified hemiplegia type ([address] ) [I69 351]     08/12/22 1015   PT Last Visit   PT Visit Date 08/12/22   Note Type   Note Type Treatment   Pain Assessment   Pain Assessment Tool 0-10   Pain Score No Pain   Restrictions/Precautions   Weight Bearing Precautions Per Order No   Other Precautions Chair Alarm; Bed Alarm; Fall Risk;Seizure  (expressive aphasia)   General   Chart Reviewed Yes   Response to Previous Treatment Patient with no complaints from previous session  Family/Caregiver Present No   Cognition   Overall Cognitive Status WFL   Arousal/Participation Alert; Responsive; Cooperative   Attention Attends with cues to redirect   Orientation Level Oriented to person;Oriented to place;Oriented to situation   Memory Decreased recall of recent events;Decreased short term memory   Following Commands Follows one step commands without difficulty   Comments Pt agreeable to PT  Pt benefits from yes/no questions; able to answer orientation questions when given options   Subjective   Subjective "I'm good "   Bed Mobility   Supine to Sit 4  Minimal assistance  (CG assist)   Additional items Assist x 1;HOB elevated; Bedrails; Increased time required;Verbal cues   Transfers   Sit to Stand 4  Minimal assistance  (CG assist)   Additional items Assist x 1; Increased time required;Verbal cues   Stand to Sit 4  Minimal assistance  (CG assist)   Additional items Assist x 1; Armrests; Increased time required;Verbal cues   Ambulation/Elevation   Gait pattern Decreased heel strike;Decreased hip extension;Decreased toe off;Step to;Short stride; Shuffling;Narrow ELENI   Gait Assistance 4  Minimal assist   Additional items Assist x 1;Verbal cues   Assistive Device Small base quad cane   Distance 50 feet   Stair Management Assistance Not tested   Balance   Static Sitting Good   Dynamic Sitting Fair +   Static Standing Fair -   Dynamic Standing Poor +   Ambulatory Poor +   Endurance Deficit   Endurance Deficit Yes   Endurance Deficit Description decreased activity tolerance   Activity Tolerance   Activity Tolerance Patient tolerated treatment well   Exercises   Quad Sets Sitting;20 reps;AROM; Bilateral  (long sitting)   Heelslides Sitting;20 reps;AROM; Bilateral  (long sitting)   Glute Sets Sitting;20 reps;AROM; Bilateral   Hip Flexion Sitting;20 reps;AROM; Bilateral   Hip Abduction Sitting;20 reps;AROM; Bilateral  (long sitting)   Knee AROM Long Arc Quad Sitting;20 reps;AROM; Bilateral   Ankle Pumps Sitting;20 reps;AROM; Bilateral   Assessment   Prognosis Good   Problem List Decreased strength;Decreased endurance; Impaired balance;Decreased mobility; Decreased coordination   Assessment Chart reviewed  Patient was received supine in bed in NAD and agreeable to PT session  Today's PT treatment session consisted of therapeutic activity for facilitation of transitional movements and safe performance of correct technique for bed mobility and sit to stand transfers, therapeutic exercise to increase lower extremity muscle strength, and gait training to promote safe and functional ambulation on level surfaces  In comparison to the previous session the patient has made progress as evident by requiring slightly decreased assistance with bed mobility  Pt was also able to ambulate an increased distance with use of quad cane  When ambulating pt continues to exhibit decreased right step length, decreased darya, and decreased heel strike  Pt requires verbal cues to ensure all four points of the quad cane on placed floor prior to taking a step  Pt continues to tolerate all therapeutic exercise well without complaints   Overall, patient tolerated today's session well and continues to be making progress towards achieving her STG's  Patient's prognosis for achieving their STG's is good as evident by pt's motivation  PT intervention continues to be appropriate as the patient continues to be limited by decreased lower extremity strength, impaired balance, decreased endurance, gait deviations, and decreased functional mobility  Continue to recommend STR  PT to continue to see patient in order to address the deficits listed above and provide interventions consistent with the POC in order to achieve STG's and optimize the patient's independence with functional mobility  Barriers to Discharge Other (Comment)  (decline in functional status)   Goals   STG Expiration Date 08/18/22   Plan   Treatment/Interventions Functional transfer training;LE strengthening/ROM; Therapeutic exercise; Endurance training;Patient/family training;Bed mobility;Gait training   Progress Progressing toward goals   PT Frequency 3-5x/wk   Recommendation   PT Discharge Recommendation Post acute rehabilitation services   AM-PAC Basic Mobility Inpatient   Turning in Bed Without Bedrails 3   Lying on Back to Sitting on Edge of Flat Bed 3   Moving Bed to Chair 3   Standing Up From Chair 3   Walk in Room 3   Climb 3-5 Stairs 2   Basic Mobility Inpatient Raw Score 17   Basic Mobility Standardized Score 39 67   Highest Level Of Mobility   JH-HLM Goal 5: Stand one or more mins   JH-HLM Achieved 7: Walk 25 feet or more   Education   Education Provided Mobility training;Home exercise program;Assistive device   Patient Reinforcement needed;Demonstrates acceptance/verbal understanding   End of Consult   Patient Position at End of Consult Seated edge of bed;Bed/Chair alarm activated; All needs within reach     Johnson Garrison PT, DPT    Time of PT treatment session: 8941-2004  25 minutes

## 2022-08-12 NOTE — PLAN OF CARE
Problem: PHYSICAL THERAPY ADULT  Goal: Performs mobility at highest level of function for planned discharge setting  See evaluation for individualized goals  Description: Treatment/Interventions: Functional transfer training, LE strengthening/ROM, Therapeutic exercise, Endurance training, Patient/family training, Bed mobility, Gait training, OT, Spoke to nursing          See flowsheet documentation for full assessment, interventions and recommendations  Outcome: Progressing  Note: Prognosis: Good  Problem List: Decreased strength, Decreased endurance, Impaired balance, Decreased mobility, Decreased coordination  Assessment: Chart reviewed  Patient was received supine in bed in NAD and agreeable to PT session  Today's PT treatment session consisted of therapeutic activity for facilitation of transitional movements and safe performance of correct technique for bed mobility and sit to stand transfers, therapeutic exercise to increase lower extremity muscle strength, and gait training to promote safe and functional ambulation on level surfaces  In comparison to the previous session the patient has made progress as evident by requiring slightly decreased assistance with bed mobility  Pt was also able to ambulate an increased distance with use of quad cane  When ambulating pt continues to exhibit decreased right step length, decreased darya, and decreased heel strike  Pt requires verbal cues to ensure all four points of the quad cane on placed floor prior to taking a step  Pt continues to tolerate all therapeutic exercise well without complaints  Overall, patient tolerated today's session well and continues to be making progress towards achieving her STG's  Patient's prognosis for achieving their STG's is good as evident by pt's motivation   PT intervention continues to be appropriate as the patient continues to be limited by decreased lower extremity strength, impaired balance, decreased endurance, gait deviations, and decreased functional mobility  Continue to recommend STR  PT to continue to see patient in order to address the deficits listed above and provide interventions consistent with the POC in order to achieve STG's and optimize the patient's independence with functional mobility  Barriers to Discharge: Other (Comment) (decline in functional status)     PT Discharge Recommendation: Post acute rehabilitation services    See flowsheet documentation for full assessment

## 2022-08-12 NOTE — ASSESSMENT & PLAN NOTE
· Patient came in with atrial fibrillation with rapid ventricular rate with a heart rate of 160, possibly precipitated by the seizure    · Continue anticoagulation with Eliquis and continue oral metoprolol

## 2022-08-12 NOTE — PLAN OF CARE
Problem: PAIN - ADULT  Goal: Verbalizes/displays adequate comfort level or baseline comfort level  Description: Interventions:  - Encourage patient to monitor pain and request assistance  - Assess pain using appropriate pain scale  - Administer analgesics based on type and severity of pain and evaluate response  - Implement non-pharmacological measures as appropriate and evaluate response  - Consider cultural and social influences on pain and pain management  - Notify physician/advanced practitioner if interventions unsuccessful or patient reports new pain  Outcome: Progressing     Problem: INFECTION - ADULT  Goal: Absence or prevention of progression during hospitalization  Description: INTERVENTIONS:  - Assess and monitor for signs and symptoms of infection  - Monitor lab/diagnostic results  - Monitor all insertion sites, i e  indwelling lines, tubes, and drains  - Monitor endotracheal if appropriate and nasal secretions for changes in amount and color  - [place] appropriate cooling/warming therapies per order  - Administer medications as ordered  - Instruct and encourage patient and family to use good hand hygiene technique  - Identify and instruct in appropriate isolation precautions for identified infection/condition  Outcome: Progressing

## 2022-08-12 NOTE — PROGRESS NOTES
[facility]  Progress Note - Jesse Victor 1960, 63 y o  female MRN: [id number]  Unit/Bed#: -01 Encounter: [id number]  Primary Care Provider: No primary care provider on file  Date and time admitted to hospital: 8/7/2022  2:23 PM    * New onset seizure [facility])  Assessment & Plan  · Patient came in with witnessed seizure at home  At the time EMS arrived the patient started to wake up but was initially confused and somnolent  · Patient does have past medical history CVA with right-sided weakness and some expressive aphasia  · CT head was normal, MRI brain negative for acute ischemia  · Neurology consulted, EEG without epileptiform waves  · Continue Keppra  · Seizure precautions  · PT/OT consults noted - STR recommended  · Patient discharged to home per request 8/10, however now agreeable to STR  · Pending authorization    Thrombocytopenia ([facility] )  Assessment & Plan  · No s/sx bleeding or abnormal bruising   · Etiology unclear, though appears chronic and stable     Aspiration pneumonia ([facility] [address] )  Assessment & Plan  · Concern for aspiration subsequent to the seizure episode  · Given ceftriaxone and fluids in ER - hold on further  · Procal is improving without intervention  · Patient remains afebrile, with no leukocytosis   · CXR negative for infiltrate    Atrial fibrillation with RVR ([facility] [address] )  Assessment & Plan  · Patient came in with atrial fibrillation with rapid ventricular rate with a heart rate of 160, possibly precipitated by the seizure  · Continue anticoagulation with Eliquis and continue oral metoprolol    Hemiplegia (HCC)  Assessment & Plan  · This is chronic, 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, expected to resolve   · Physical and occupational therapy to continue      VTE Pharmacologic Prophylaxis: VTE Score: 1 Low Risk (Score 0-2) - Encourage Ambulation  Patient Centered Rounds: I performed bedside rounds with nursing staff today    Discussions with Specialists or Other Care Team Provider:  Case management    Education and Discussions with Family / Patient: Patient declined call to   Time Spent for Care: 15 minutes  More than 50% of total time spent on counseling and coordination of care as described above  Current Length of Stay: 5 day(s)  Current Patient Status: Inpatient   Certification Statement: The patient will continue to require additional inpatient hospital stay due to Pending placement  Discharge Plan: TBD pending placement    Code Status: Level 1 - Full Code    Subjective:   Patient resting in bed at this time  Denies any acute complaints at this time  States she is eating okay and she is moving her bowels okay  Objective:     Vitals:   Temp (24hrs), Av 3 °F (36 8 °C), Min:98 2 °F (36 8 °C), Max:98 3 °F (36 8 °C)    Temp:  [98 2 °F (36 8 °C)-98 3 °F (36 8 °C)] 98 3 °F (36 8 °C)  HR:  [50-58] 50  Resp:  [15-18] 15  BP: ()/(64-68) 106/68  SpO2:  [92 %-94 %] 92 %  There is no height or weight on file to calculate BMI  Input and Output Summary (last 24 hours): Intake/Output Summary (Last 24 hours) at 2022 1138  Last data filed at 2022 0500  Gross per 24 hour   Intake --   Output 200 ml   Net -200 ml       Physical Exam:   Physical Exam  Vitals and nursing note reviewed  Constitutional:       General: She is not in acute distress  Appearance: She is normal weight  She is not ill-appearing  Cardiovascular:      Rate and Rhythm: Normal rate  Pulses: Normal pulses  Pulmonary:      Effort: Pulmonary effort is normal    Abdominal:      General: Bowel sounds are normal       Palpations: Abdomen is soft  Musculoskeletal:         General: Normal range of motion  Skin:     General: Skin is warm  Neurological:      Mental Status: She is alert  Mental status is at baseline        Comments: Expressive aphasia   Psychiatric:         Mood and Affect: Mood normal           Additional Data: Labs:  Results from last 7 days   Lab Units 08/10/22  0811   WBC Thousand/uL 4 87   HEMOGLOBIN g/dL 12 4   HEMATOCRIT % 37 2   PLATELETS Thousands/uL 116*   NEUTROS PCT % 57   LYMPHS PCT % 30   MONOS PCT % 9   EOS PCT % 3     Results from last 7 days   Lab Units 08/10/22  0811 08/09/22  0831 08/08/22  0449   SODIUM mmol/L 136   < > 141   POTASSIUM mmol/L 3 5   < > 3 1*   CHLORIDE mmol/L 102   < > 109*   CO2 mmol/L 28   < > 24   BUN mg/dL 10   < > 8   CREATININE mg/dL 0 66   < > 0 56*   ANION GAP mmol/L 6   < > 8   CALCIUM mg/dL 9 1   < > 8 4   ALBUMIN g/dL  --   --  2 8*   TOTAL BILIRUBIN mg/dL  --   --  0 69   ALK PHOS U/L  --   --  59   ALT U/L  --   --  17   AST U/L  --   --  17   GLUCOSE RANDOM mg/dL 103   < > 93    < > = values in this interval not displayed  Results from last 7 days   Lab Units 08/07/22  1425   POC GLUCOSE mg/dl 141*         Results from last 7 days   Lab Units 08/10/22  0811 08/09/22  0831 08/07/22  1431   LACTIC ACID mmol/L  --   --  1 9   PROCALCITONIN ng/ml 0 90* 1 36* 0 81*       Lines/Drains:  Invasive Devices  Report    Drain  Duration           External Urinary Catheter 3 days                      Imaging: No pertinent imaging reviewed      Recent Cultures (last 7 days):         Last 24 Hours Medication List:   Current Facility-Administered Medications   Medication Dose Route Frequency Provider Last Rate    acetaminophen  650 mg Oral Q6H PRN Hermelindo Martinez MD      aluminum-magnesium hydroxide-simethicone  30 mL Oral Q6H PRN Hermelindo Martinez MD      apixaban  5 mg Oral BID Hermelindo Martinez MD      atorvastatin  40 mg Oral Daily Hermelindo Martinez MD      levETIRAcetam  500 mg Oral Q12H Aaron Blanc MD      magnesium hydroxide  30 mL Oral Daily PRN Hermelindo Martinez MD      metoprolol succinate  25 mg Oral Daily Hermelindo Martinez MD      ondansetron  4 mg Intravenous Q6H PRN Hermelindo Martinez MD      sertraline  50 mg Oral Daily Michael Encarnacion MD      simethicone  80 mg Oral 4x Daily PRN Michael Encarnacion MD      thiamine  100 mg Oral Daily Michael Encarnacion MD          Today, Patient Was Seen By: MARTIN Adame    **Please Note: This note may have been constructed using a voice recognition system  **

## 2022-08-12 NOTE — PLAN OF CARE
Problem: MOBILITY - ADULT  Goal: Maintain or return to baseline ADL function  Description: INTERVENTIONS:  -  Assess patient's ability to carry out ADLs; assess patient's baseline for ADL function and identify physical deficits which impact ability to perform ADLs (bathing, care of mouth/teeth, toileting, grooming, dressing, etc )  - Assess/evaluate cause of self-care deficits   - Assess range of motion  - Assess patient's mobility; develop plan if impaired  - Assess patient's need for assistive devices and provide as appropriate  - Encourage maximum independence but intervene and supervise when necessary  - Involve family in performance of ADLs  - Assess for home care needs following discharge   - Consider OT consult to assist with ADL evaluation and planning for discharge  - Provide patient education as appropriate  Outcome: Progressing  Goal: Maintains/Returns to pre admission functional level  Description: INTERVENTIONS:  - Perform BMAT or MOVE assessment daily    - Set and communicate daily mobility goal to care team and patient/family/caregiver  - Collaborate with rehabilitation services on mobility goals if consulted  - Perform Range of Motion 3 times a day  - Reposition patient every 2 hours    - Dangle patient  times a day  - Stand patient 3 times a day  - Ambulate patient 3 times a day  - Out of bed to chair 3 times a day   - Out of bed for meals 3 times a day  - Out of bed for toileting  - Record patient progress and toleration of activity level   Outcome: Progressing     Problem: Prexisting or High Potential for Compromised Skin Integrity  Goal: Skin integrity is maintained or improved  Description: INTERVENTIONS:  - Identify patients at risk for skin breakdown  - Assess and monitor skin integrity  - Assess and monitor nutrition and hydration status  - Monitor labs   - Assess for incontinence   - Turn and reposition patient  - Assist with mobility/ambulation  - Relieve pressure over bony prominences  - Avoid friction and shearing  - Provide appropriate hygiene as needed including keeping skin clean and dry  - Evaluate need for skin moisturizer/barrier cream  - Collaborate with interdisciplinary team   - Patient/family teaching  - Consider wound care consult   Outcome: Progressing     Problem: Potential for Falls  Goal: Patient will remain free of falls  Description: INTERVENTIONS:  - Educate patient/family on patient safety including physical limitations  - Instruct patient to call for assistance with activity   - Consult OT/PT to assist with strengthening/mobility   - Keep Call bell within reach  - Keep bed low and locked with side rails adjusted as appropriate  - Keep care items and personal belongings within reach  - Initiate and maintain comfort rounds  - Make Fall Risk Sign visible to staff  - Offer Toileting every 2 Hours, in advance of need  - Initiate/Maintain bedalarm  - Obtain necessary fall risk management equipment:   - Apply yellow socks and bracelet for high fall risk patients  - Consider moving patient to room near nurses station  Outcome: Progressing     Problem: PAIN - ADULT  Goal: Verbalizes/displays adequate comfort level or baseline comfort level  Description: Interventions:  - Encourage patient to monitor pain and request assistance  - Assess pain using appropriate pain scale  - Administer analgesics based on type and severity of pain and evaluate response  - Implement non-pharmacological measures as appropriate and evaluate response  - Consider cultural and social influences on pain and pain management  - Notify physician/advanced practitioner if interventions unsuccessful or patient reports new pain  Outcome: Progressing     Problem: INFECTION - ADULT  Goal: Absence or prevention of progression during hospitalization  Description: INTERVENTIONS:  - Assess and monitor for signs and symptoms of infection  - Monitor lab/diagnostic results  - Monitor all insertion sites, i e  indwelling lines, tubes, and drains  - Monitor endotracheal if appropriate and nasal secretions for changes in amount and color  - Media appropriate cooling/warming therapies per order  - Administer medications as ordered  - Instruct and encourage patient and family to use good hand hygiene technique  - Identify and instruct in appropriate isolation precautions for identified infection/condition  Outcome: Progressing     Problem: SAFETY ADULT  Goal: Maintain or return to baseline ADL function  Description: INTERVENTIONS:  -  Assess patient's ability to carry out ADLs; assess patient's baseline for ADL function and identify physical deficits which impact ability to perform ADLs (bathing, care of mouth/teeth, toileting, grooming, dressing, etc )  - Assess/evaluate cause of self-care deficits   - Assess range of motion  - Assess patient's mobility; develop plan if impaired  - Assess patient's need for assistive devices and provide as appropriate  - Encourage maximum independence but intervene and supervise when necessary  - Involve family in performance of ADLs  - Assess for home care needs following discharge   - Consider OT consult to assist with ADL evaluation and planning for discharge  - Provide patient education as appropriate  Outcome: Progressing  Goal: Maintains/Returns to pre admission functional level  Description: INTERVENTIONS:  - Perform BMAT or MOVE assessment daily    - Set and communicate daily mobility goal to care team and patient/family/caregiver  - Collaborate with rehabilitation services on mobility goals if consulted  - Perform Range of Motion 3 times a day  - Reposition patient every 2 hours    - Dangle patient 3 times a day  - Stand patient 3 times a day  - Ambulate patient 3 times a day  - Out of bed to chair 3 times a day   - Out of bed for meals 3 times a day  - Out of bed for toileting  - Record patient progress and toleration of activity level   Outcome: Progressing  Goal: Patient will remain free of falls  Description: INTERVENTIONS:  - Educate patient/family on patient safety including physical limitations  - Instruct patient to call for assistance with activity   - Consult OT/PT to assist with strengthening/mobility   - Keep Call bell within reach  - Keep bed low and locked with side rails adjusted as appropriate  - Keep care items and personal belongings within reach  - Initiate and maintain comfort rounds  - Make Fall Risk Sign visible to staff  - Offer Toileting every 2 Hours, in advance of need  - Initiate/Maintain bedalarm  - Obtain necessary fall risk management equipment:   - Apply yellow socks and bracelet for high fall risk patients  - Consider moving patient to room near nurses station  Outcome: Progressing     Problem: DISCHARGE PLANNING  Goal: Discharge to home or other facility with appropriate resources  Description: INTERVENTIONS:  - Identify barriers to discharge w/patient and caregiver  - Arrange for needed discharge resources and transportation as appropriate  - Identify discharge learning needs (meds, wound care, etc )  - Arrange for interpretive services to assist at discharge as needed  - Refer to Case Management Department for coordinating discharge planning if the patient needs post-hospital services based on physician/advanced practitioner order or complex needs related to functional status, cognitive ability, or social support system  Outcome: Progressing     Problem: Knowledge Deficit  Goal: Patient/family/caregiver demonstrates understanding of disease process, treatment plan, medications, and discharge instructions  Description: Complete learning assessment and assess knowledge base    Interventions:  - Provide teaching at level of understanding  - Provide teaching via preferred learning methods  Outcome: Progressing     Problem: NEUROSENSORY - ADULT  Goal: Achieves stable or improved neurological status  Description: INTERVENTIONS  - Monitor and report changes in neurological status  - Monitor vital signs such as temperature, blood pressure, glucose, and any other labs ordered   - Initiate measures to prevent increased intracranial pressure  - Monitor for seizure activity and implement precautions if appropriate      Outcome: Progressing  Goal: Remains free of injury related to seizures activity  Description: INTERVENTIONS  - Maintain airway, patient safety  and administer oxygen as ordered  - Monitor patient for seizure activity, document and report duration and description of seizure to physician/advanced practitioner  - If seizure occurs,  ensure patient safety during seizure  - Reorient patient post seizure  - Seizure pads on all 4 side rails  - Instruct patient/family to notify RN of any seizure activity including if an aura is experienced  - Instruct patient/family to call for assistance with activity based on nursing assessment  - Administer anti-seizure medications if ordered    Outcome: Progressing  Goal: Achieves maximal functionality and self care  Description: INTERVENTIONS  - Monitor swallowing and airway patency with patient fatigue and changes in neurological status  - Encourage and assist patient to increase activity and self care     - Encourage visually impaired, hearing impaired and aphasic patients to use assistive/communication devices  Outcome: Progressing

## 2022-08-12 NOTE — CASE MANAGEMENT
Case Management Discharge Planning Note    Patient name Rosa Isela Gomez  Location [address] 306/-21 MRN [id number]  : 1960 Date 2022       Current Admission Date: 2022  Current Admission Diagnosis:New onset seizure [facility])   Patient Active Problem List    Diagnosis Date Noted    [facility] ([facility] ) 2022    Atrial fibrillation with RVR ([facility] ) 2022    New onset seizure ([facility] ) 2022    Aspiration pneumonia ([facility] ) 2022    Hemiplegia ([facility] ) 10/15/2021    Mixed hyperlipidemia 10/15/2021    Paroxysmal atrial fibrillation ([facility] ) 10/15/2021    Acute ischemic left MCA stroke ([facility] ) 10/14/2021      LOS (days): 5  Geometric Mean LOS (GMLOS) (days): 4 30  Days to GMLOS:-0 8     OBJECTIVE:  Risk of Unplanned Readmission Score: 8 9         Current admission status: Inpatient   Preferred Pharmacy:   [id number]- [place], [place] - [address] [address]  Phone: [phone] Fax: [phone]    Primary Care Provider: No primary care provider on file  Primary Insurance: [address] [place] [address]  Secondary Insurance:     DISCHARGE DETAILS:                                          Other Referral/Resources/Interventions Provided:  Referral Comments: Awaiting auth from [employer]  For placement at [place] [facility] [place] when obtained  Sons updated by Williams Obregon in Admissions at [email or web address]           Treatment Team Recommendation: Short Term Rehab, SNF  Discharge Destination Plan[de-identified] SNF, Short Term Rehab  Transport at Discharge : BLS Ambulance

## 2022-08-13 PROCEDURE — 99231 SBSQ HOSP IP/OBS SF/LOW 25: CPT | Performed by: NURSE PRACTITIONER

## 2022-08-13 RX ADMIN — APIXABAN 5 MG: 5 TABLET, FILM COATED ORAL at 09:31

## 2022-08-13 RX ADMIN — THIAMINE HCL TAB 100 MG 100 MG: 100 TAB at 09:31

## 2022-08-13 RX ADMIN — ATORVASTATIN CALCIUM 40 MG: 40 TABLET, FILM COATED ORAL at 09:31

## 2022-08-13 RX ADMIN — LEVETIRACETAM 500 MG: 500 TABLET, FILM COATED ORAL at 09:31

## 2022-08-13 RX ADMIN — SERTRALINE HYDROCHLORIDE 50 MG: 50 TABLET ORAL at 09:31

## 2022-08-13 RX ADMIN — APIXABAN 5 MG: 5 TABLET, FILM COATED ORAL at 17:12

## 2022-08-13 RX ADMIN — LEVETIRACETAM 500 MG: 500 TABLET, FILM COATED ORAL at 20:58

## 2022-08-13 RX ADMIN — METOPROLOL SUCCINATE 25 MG: 25 TABLET, EXTENDED RELEASE ORAL at 09:31

## 2022-08-13 NOTE — PLAN OF CARE
Problem: MOBILITY - ADULT  Goal: Maintain or return to baseline ADL function  Description: INTERVENTIONS:  -  Assess patient's ability to carry out ADLs; assess patient's baseline for ADL function and identify physical deficits which impact ability to perform ADLs (bathing, care of mouth/teeth, toileting, grooming, dressing, etc )  - Assess/evaluate cause of self-care deficits   - Assess range of motion  - Assess patient's mobility; develop plan if impaired  - Assess patient's need for assistive devices and provide as appropriate  - Encourage maximum independence but intervene and supervise when necessary  - Involve family in performance of ADLs  - Assess for home care needs following discharge   - Consider OT consult to assist with ADL evaluation and planning for discharge  - Provide patient education as appropriate  Outcome: Progressing       Problem: SAFETY ADULT  Goal: Maintain or return to baseline ADL function  Description: INTERVENTIONS:  -  Assess patient's ability to carry out ADLs; assess patient's baseline for ADL function and identify physical deficits which impact ability to perform ADLs (bathing, care of mouth/teeth, toileting, grooming, dressing, etc )  - Assess/evaluate cause of self-care deficits   - Assess range of motion  - Assess patient's mobility; develop plan if impaired  - Assess patient's need for assistive devices and provide as appropriate  - Encourage maximum independence but intervene and supervise when necessary  - Involve family in performance of ADLs  - Assess for home care needs following discharge   - Consider OT consult to assist with ADL evaluation and planning for discharge  - Provide patient education as appropriate  Outcome: Progressing     Problem: DISCHARGE PLANNING  Goal: Discharge to home or other facility with appropriate resources  Description: INTERVENTIONS:  - Identify barriers to discharge w/patient and caregiver  - Arrange for needed discharge resources and transportation as appropriate  - Identify discharge learning needs (meds, wound care, etc )  - Arrange for interpretive services to assist at discharge as needed  - Refer to Case Management Department for coordinating discharge planning if the patient needs post-hospital services based on physician/advanced practitioner order or complex needs related to functional status, cognitive ability, or social support system  Outcome: Progressing

## 2022-08-13 NOTE — PLAN OF CARE
Problem: NEUROSENSORY - ADULT  Goal: Achieves stable or improved neurological status  Description: INTERVENTIONS  - Monitor and report changes in neurological status  - Monitor vital signs such as temperature, blood pressure, glucose, and any other labs ordered   - Initiate measures to prevent increased intracranial pressure  - Monitor for seizure activity and implement precautions if appropriate      Outcome: Progressing  Goal: Remains free of injury related to seizures activity  Description: INTERVENTIONS  - Maintain airway, patient safety  and administer oxygen as ordered  - Monitor patient for seizure activity, document and report duration and description of seizure to physician/advanced practitioner  - If seizure occurs,  ensure patient safety during seizure  - Reorient patient post seizure  - Seizure pads on all 4 side rails  - Instruct patient/family to notify RN of any seizure activity including if an aura is experienced  - Instruct patient/family to call for assistance with activity based on nursing assessment  - Administer anti-seizure medications if ordered    Outcome: Progressing  Goal: Achieves maximal functionality and self care  Description: INTERVENTIONS  - Monitor swallowing and airway patency with patient fatigue and changes in neurological status  - Encourage and assist patient to increase activity and self care  - Encourage visually impaired, hearing impaired and aphasic patients to use assistive/communication devices  Outcome: Progressing     Problem: Knowledge Deficit  Goal: Patient/family/caregiver demonstrates understanding of disease process, treatment plan, medications, and discharge instructions  Description: Complete learning assessment and assess knowledge base    Interventions:  - Provide teaching at level of understanding  - Provide teaching via preferred learning methods  Outcome: Progressing     Problem: DISCHARGE PLANNING  Goal: Discharge to home or other facility with appropriate resources  Description: INTERVENTIONS:  - Identify barriers to discharge w/patient and caregiver  - Arrange for needed discharge resources and transportation as appropriate  - Identify discharge learning needs (meds, wound care, etc )  - Arrange for interpretive services to assist at discharge as needed  - Refer to Case Management Department for coordinating discharge planning if the patient needs post-hospital services based on physician/advanced practitioner order or complex needs related to functional status, cognitive ability, or social support system  Outcome: Progressing

## 2022-08-13 NOTE — PROGRESS NOTES
[address] [facility]  Progress Note - Kendra Beatty 1960, 63 y o  female MRN: [id number]  Unit/Bed#: -01 Encounter: [id number]  Primary Care Provider: No primary care provider on file  Date and time admitted to hospital: 8/7/2022  2:23 PM    * New onset seizure [facility])  Assessment & Plan  · Patient came in with witnessed seizure at home  At the time EMS arrived the patient started to wake up but was initially confused and somnolent  · Patient does have past medical history CVA with right-sided weakness and some expressive aphasia  · CT head was normal, MRI brain negative for acute ischemia  · Neurology consulted, EEG without epileptiform waves  · Continue Keppra  · Seizure precautions  · PT/OT consults noted - STR recommended  · Patient discharged to home per request 8/10, however now agreeable to STR  · Pending authorization    Thrombocytopenia ([facility] )  Assessment & Plan  · No s/sx bleeding or abnormal bruising   · Etiology unclear, though appears chronic and stable     Atrial fibrillation with RVR ([facility] )  Assessment & Plan  · Patient came in with atrial fibrillation with rapid ventricular rate with a heart rate of 160, possibly precipitated by the seizure  · Continue anticoagulation with Eliquis and continue oral metoprolol  · Heart rate controlled 50s to 60s    Hemiplegia (HCC)  Assessment & Plan  · This is chronic, 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, expected to resolve   · Physical and occupational therapy to continue      VTE Pharmacologic Prophylaxis: VTE Score: 1 Low Risk (Score 0-2) - Encourage Ambulation  Patient Centered Rounds: I performed bedside rounds with nursing staff today  Discussions with Specialists or Other Care Team Provider:     Education and Discussions with Family / Patient: Patient declined call to   Time Spent for Care: 15 minutes   More than 50% of total time spent on counseling and coordination of care as described above     Current Length of Stay: 6 day(s)  Current Patient Status: Inpatient   Certification Statement: The patient will continue to require additional inpatient hospital stay due to pending placement  Discharge Plan: TBD pending placement    Code Status: Level 1 - Full Code    Subjective:   Patient resting in bed, denies complaints of pain, shortness of breath, fever, or chills  No acute complaints  Objective:     Vitals:   Temp (24hrs), Av 3 °F (36 8 °C), Min:98 1 °F (36 7 °C), Max:98 5 °F (36 9 °C)    Temp:  [98 1 °F (36 7 °C)-98 5 °F (36 9 °C)] 98 1 °F (36 7 °C)  HR:  [52-60] 52  Resp:  [16-18] 16  BP: ()/(66-69) 116/69  SpO2:  [94 %] 94 %  Body mass index is 22 29 kg/m²  Input and Output Summary (last 24 hours): Intake/Output Summary (Last 24 hours) at 2022 9209  Last data filed at 2022 1719  Gross per 24 hour   Intake 240 ml   Output --   Net 240 ml       Physical Exam:   Physical Exam  Vitals and nursing note reviewed  Constitutional:       General: She is not in acute distress  Appearance: She is not ill-appearing  Cardiovascular:      Rate and Rhythm: Normal rate  Pulmonary:      Effort: Pulmonary effort is normal  No respiratory distress  Neurological:      Mental Status: She is alert  Mental status is at baseline        Comments: + expressive aphasia   Psychiatric:         Mood and Affect: Mood normal           Additional Data:     Labs:  Results from last 7 days   Lab Units 08/10/22  0811   WBC Thousand/uL 4 87   HEMOGLOBIN g/dL 12 4   HEMATOCRIT % 37 2   PLATELETS Thousands/uL 116*   NEUTROS PCT % 57   LYMPHS PCT % 30   MONOS PCT % 9   EOS PCT % 3     Results from last 7 days   Lab Units 08/10/22  0811 22  0831 22  0449   SODIUM mmol/L 136   < > 141   POTASSIUM mmol/L 3 5   < > 3 1*   CHLORIDE mmol/L 102   < > 109*   CO2 mmol/L 28   < > 24   BUN mg/dL 10   < > 8   CREATININE mg/dL 0 66   < > 0 56*   ANION GAP mmol/L 6   < > 8   CALCIUM mg/dL 9 1 < > 8 4   ALBUMIN g/dL  --   --  2 8*   TOTAL BILIRUBIN mg/dL  --   --  0 69   ALK PHOS U/L  --   --  59   ALT U/L  --   --  17   AST U/L  --   --  17   GLUCOSE RANDOM mg/dL 103   < > 93    < > = values in this interval not displayed  Results from last 7 days   Lab Units 08/07/22  1425   POC GLUCOSE mg/dl 141*         Results from last 7 days   Lab Units 08/10/22  0811 08/09/22  0831 08/07/22  1431   LACTIC ACID mmol/L  --   --  1 9   PROCALCITONIN ng/ml 0 90* 1 36* 0 81*       Lines/Drains:  Invasive Devices  Report    Drain  Duration           External Urinary Catheter 4 days                      Imaging: No pertinent imaging reviewed  Recent Cultures (last 7 days):         Last 24 Hours Medication List:   Current Facility-Administered Medications   Medication Dose Route Frequency Provider Last Rate    acetaminophen  650 mg Oral Q6H PRN Janel Kim MD      aluminum-magnesium hydroxide-simethicone  30 mL Oral Q6H PRN Janel Kim MD      apixaban  5 mg Oral BID Janel Kim MD      atorvastatin  40 mg Oral Daily Janel Kim MD      levETIRAcetam  500 mg Oral Q12H Bib Luque MD      magnesium hydroxide  30 mL Oral Daily PRN Janel Kim MD      metoprolol succinate  25 mg Oral Daily Janel Kim MD      ondansetron  4 mg Intravenous Q6H PRN Janel Kim MD      sertraline  50 mg Oral Daily Janel Kim MD      simethicone  80 mg Oral 4x Daily PRN Janel Kim MD      thiamine  100 mg Oral Daily Janel Kim MD          Today, Patient Was Seen By: MARTIN Daugherty    **Please Note: This note may have been constructed using a voice recognition system  **

## 2022-08-13 NOTE — ASSESSMENT & PLAN NOTE
· Patient came in with atrial fibrillation with rapid ventricular rate with a heart rate of 160, possibly precipitated by the seizure    · Continue anticoagulation with Eliquis and continue oral metoprolol  · Heart rate controlled 50s to 60s

## 2022-08-14 PROCEDURE — 99231 SBSQ HOSP IP/OBS SF/LOW 25: CPT | Performed by: NURSE PRACTITIONER

## 2022-08-14 RX ADMIN — ATORVASTATIN CALCIUM 40 MG: 40 TABLET, FILM COATED ORAL at 08:04

## 2022-08-14 RX ADMIN — THIAMINE HCL TAB 100 MG 100 MG: 100 TAB at 08:04

## 2022-08-14 RX ADMIN — APIXABAN 5 MG: 5 TABLET, FILM COATED ORAL at 17:36

## 2022-08-14 RX ADMIN — METOPROLOL SUCCINATE 25 MG: 25 TABLET, EXTENDED RELEASE ORAL at 08:04

## 2022-08-14 RX ADMIN — SERTRALINE HYDROCHLORIDE 50 MG: 50 TABLET ORAL at 08:04

## 2022-08-14 RX ADMIN — APIXABAN 5 MG: 5 TABLET, FILM COATED ORAL at 08:04

## 2022-08-14 RX ADMIN — LEVETIRACETAM 500 MG: 500 TABLET, FILM COATED ORAL at 20:27

## 2022-08-14 RX ADMIN — LEVETIRACETAM 500 MG: 500 TABLET, FILM COATED ORAL at 08:04

## 2022-08-14 NOTE — PROGRESS NOTES
[address] [facility]  Progress Note - Timothy Bettencourt 1960, 63 y o  female MRN: [id number]  Unit/Bed#: -01 Encounter: [id number]  Primary Care Provider: No primary care provider on file  Date and time admitted to hospital: 8/7/2022  2:23 PM    * New onset seizure [facility])  Assessment & Plan  · Patient came in with witnessed seizure at home  At the time EMS arrived the patient started to wake up but was initially confused and somnolent  · Patient does have past medical history CVA with right-sided weakness and some expressive aphasia  · CT head was normal, MRI brain negative for acute ischemia  · Neurology consulted, EEG without epileptiform waves  · Continue Keppra  · Seizure precautions  · PT/OT consults noted - STR recommended  · Patient discharged to home per request 8/10, however now agreeable to STR  · Pending authorization    Thrombocytopenia ([facility] )  Assessment & Plan  · No s/sx bleeding or abnormal bruising   · Etiology unclear, though appears chronic and stable     Aspiration pneumonia ([facility] )  Assessment & Plan  · Concern for aspiration subsequent to the seizure episode  · Given ceftriaxone and fluids in ER - hold on further  · Procal is improving without intervention  · Patient remains afebrile, with no leukocytosis   · CXR negative for infiltrate    Atrial fibrillation with RVR ([facility] )  Assessment & Plan  · Patient came in with atrial fibrillation with rapid ventricular rate with a heart rate of 160, possibly precipitated by the seizure  · Continue anticoagulation with Eliquis and continue oral metoprolol  · Heart rate controlled 50s to 60s    Hemiplegia (HCC)  Assessment & Plan  · This is chronic, 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, expected to resolve   · Physical and occupational therapy to continue      VTE Pharmacologic Prophylaxis: VTE Score: 1 Low Risk (Score 0-2) - Encourage Ambulation      Patient Centered Rounds: I performed bedside rounds with nursing staff today   Discussions with Specialists or Other Care Team Provider:     Education and Discussions with Family / Patient: Patient declined call to   Time Spent for Care: 15 minutes  More than 50% of total time spent on counseling and coordination of care as described above  Current Length of Stay: 7 day(s)  Current Patient Status: Inpatient   Certification Statement: The patient will continue to require additional inpatient hospital stay due to pending placement  Discharge Plan: TBD pending placement    Code Status: Level 1 - Full Code    Subjective:   Patient resting in bed, denies complaints of pain, shortness of breath, fever, or chills  No acute complaints  Objective:     Vitals:   Temp (24hrs), Av 2 °F (36 8 °C), Min:98 1 °F (36 7 °C), Max:98 4 °F (36 9 °C)    Temp:  [98 1 °F (36 7 °C)-98 4 °F (36 9 °C)] 98 2 °F (36 8 °C)  HR:  [50-51] 51  Resp:  [16] 16  BP: (111-114)/(67-68) 111/67  SpO2:  [92 %-96 %] 92 %  Body mass index is 22 29 kg/m²  Input and Output Summary (last 24 hours): Intake/Output Summary (Last 24 hours) at 2022 1139  Last data filed at 2022 1745  Gross per 24 hour   Intake 340 ml   Output --   Net 340 ml       Physical Exam:   Physical Exam  Vitals and nursing note reviewed  Constitutional:       General: She is not in acute distress  Appearance: She is not ill-appearing  Cardiovascular:      Rate and Rhythm: Normal rate  Pulmonary:      Effort: Pulmonary effort is normal  No respiratory distress  Neurological:      Mental Status: She is alert  Mental status is at baseline        Comments: + expressive aphasia   Psychiatric:         Mood and Affect: Mood normal           Additional Data:     Labs:  Results from last 7 days   Lab Units 08/10/22  0811   WBC Thousand/uL 4 87   HEMOGLOBIN g/dL 12 4   HEMATOCRIT % 37 2   PLATELETS Thousands/uL 116*   NEUTROS PCT % 57   LYMPHS PCT % 30   MONOS PCT % 9   EOS PCT % 3     Results from last 7 days Lab Units 08/10/22  0811 08/09/22  0831 08/08/22  0449   SODIUM mmol/L 136   < > 141   POTASSIUM mmol/L 3 5   < > 3 1*   CHLORIDE mmol/L 102   < > 109*   CO2 mmol/L 28   < > 24   BUN mg/dL 10   < > 8   CREATININE mg/dL 0 66   < > 0 56*   ANION GAP mmol/L 6   < > 8   CALCIUM mg/dL 9 1   < > 8 4   ALBUMIN g/dL  --   --  2 8*   TOTAL BILIRUBIN mg/dL  --   --  0 69   ALK PHOS U/L  --   --  59   ALT U/L  --   --  17   AST U/L  --   --  17   GLUCOSE RANDOM mg/dL 103   < > 93    < > = values in this interval not displayed  Results from last 7 days   Lab Units 08/07/22  1425   POC GLUCOSE mg/dl 141*         Results from last 7 days   Lab Units 08/10/22  0811 08/09/22  0831 08/07/22  1431   LACTIC ACID mmol/L  --   --  1 9   PROCALCITONIN ng/ml 0 90* 1 36* 0 81*       Lines/Drains:  Invasive Devices  Report    Drain  Duration           External Urinary Catheter 5 days                      Imaging: No pertinent imaging reviewed      Recent Cultures (last 7 days):         Last 24 Hours Medication List:   Current Facility-Administered Medications   Medication Dose Route Frequency Provider Last Rate    acetaminophen  650 mg Oral Q6H PRN Bassam Jennings MD      aluminum-magnesium hydroxide-simethicone  30 mL Oral Q6H PRN Bassam Jennings MD      apixaban  5 mg Oral BID Bassam Jennings MD      atorvastatin  40 mg Oral Daily Bassam Jennings MD      levETIRAcetam  500 mg Oral Q12H Stanley Zamora MD      magnesium hydroxide  30 mL Oral Daily PRN Basasm Jennings MD      metoprolol succinate  25 mg Oral Daily Bassam Jennings MD      ondansetron  4 mg Intravenous Q6H PRN Bassam Jennings MD      sertraline  50 mg Oral Daily Bassam Jennings MD      simethicone  80 mg Oral 4x Daily PRN Bassam Jennings MD      thiamine  100 mg Oral Daily Bassam Jennings MD          Today, Patient Was Seen By: MARTIN Moore    **Please Note: This note may have been constructed using a voice recognition system  **

## 2022-08-14 NOTE — ASSESSMENT & PLAN NOTE
· Patient came in with witnessed seizure at home    At the time EMS arrived the patient started to wake up but was initially confused and somnolent  · Patient does have past medical history CVA with right-sided weakness and some expressive aphasia  · CT head was normal, MRI brain negative for acute ischemia  · Neurology consulted, EEG without epileptiform waves  · Continue Keppra  · Seizure precautions  · PT/OT consults noted - STR recommended  · Plan to discharge to rehab

## 2022-08-14 NOTE — PLAN OF CARE
Problem: Prexisting or High Potential for Compromised Skin Integrity  Goal: Skin integrity is maintained or improved  Description: INTERVENTIONS:  - Identify patients at risk for skin breakdown  - Assess and monitor skin integrity  - Assess and monitor nutrition and hydration status  - Monitor labs   - Assess for incontinence   - Turn and reposition patient  - Assist with mobility/ambulation  - Relieve pressure over bony prominences  - Avoid friction and shearing  - Provide appropriate hygiene as needed including keeping skin clean and dry  - Evaluate need for skin moisturizer/barrier cream  - Collaborate with interdisciplinary team   - Patient/family teaching  - Consider wound care consult   Outcome: Progressing     Problem: PAIN - ADULT  Goal: Verbalizes/displays adequate comfort level or baseline comfort level  Description: Interventions:  - Encourage patient to monitor pain and request assistance  - Assess pain using appropriate pain scale  - Administer analgesics based on type and severity of pain and evaluate response  - Implement non-pharmacological measures as appropriate and evaluate response  - Consider cultural and social influences on pain and pain management  - Notify physician/advanced practitioner if interventions unsuccessful or patient reports new pain  Outcome: Progressing     Problem: DISCHARGE PLANNING  Goal: Discharge to home or other facility with appropriate resources  Description: INTERVENTIONS:  - Identify barriers to discharge w/patient and caregiver  - Arrange for needed discharge resources and transportation as appropriate  - Identify discharge learning needs (meds, wound care, etc )  - Arrange for interpretive services to assist at discharge as needed  - Refer to Case Management Department for coordinating discharge planning if the patient needs post-hospital services based on physician/advanced practitioner order or complex needs related to functional status, cognitive ability, or social support system  Outcome: Progressing     Problem: Knowledge Deficit  Goal: Patient/family/caregiver demonstrates understanding of disease process, treatment plan, medications, and discharge instructions  Description: Complete learning assessment and assess knowledge base    Interventions:  - Provide teaching at level of understanding  - Provide teaching via preferred learning methods  Outcome: Progressing

## 2022-08-15 VITALS
SYSTOLIC BLOOD PRESSURE: 99 MMHG | WEIGHT: 146.61 LBS | BODY MASS INDEX: 22.22 KG/M2 | HEIGHT: 68 IN | OXYGEN SATURATION: 93 % | HEART RATE: 47 BPM | DIASTOLIC BLOOD PRESSURE: 66 MMHG | TEMPERATURE: 98.3 F | RESPIRATION RATE: 18 BRPM

## 2022-08-15 LAB
FLUAV RNA RESP QL NAA+PROBE: NEGATIVE
FLUBV RNA RESP QL NAA+PROBE: NEGATIVE
RSV RNA RESP QL NAA+PROBE: NEGATIVE
SARS-COV-2 RNA RESP QL NAA+PROBE: NEGATIVE

## 2022-08-15 PROCEDURE — 97116 GAIT TRAINING THERAPY: CPT

## 2022-08-15 PROCEDURE — 97530 THERAPEUTIC ACTIVITIES: CPT

## 2022-08-15 PROCEDURE — 97535 SELF CARE MNGMENT TRAINING: CPT

## 2022-08-15 PROCEDURE — 97110 THERAPEUTIC EXERCISES: CPT

## 2022-08-15 PROCEDURE — 0241U HB NFCT DS VIR RESP RNA 4 TRGT: CPT | Performed by: INTERNAL MEDICINE

## 2022-08-15 RX ORDER — LEVETIRACETAM 500 MG/1
500 TABLET ORAL EVERY 12 HOURS SCHEDULED
Qty: 60 TABLET | Refills: 0
Start: 2022-08-15 | End: 2022-10-10 | Stop reason: SDUPTHER

## 2022-08-15 RX ADMIN — APIXABAN 5 MG: 5 TABLET, FILM COATED ORAL at 09:48

## 2022-08-15 RX ADMIN — SERTRALINE HYDROCHLORIDE 50 MG: 50 TABLET ORAL at 09:48

## 2022-08-15 RX ADMIN — APIXABAN 5 MG: 5 TABLET, FILM COATED ORAL at 17:48

## 2022-08-15 RX ADMIN — THIAMINE HCL TAB 100 MG 100 MG: 100 TAB at 09:48

## 2022-08-15 RX ADMIN — LEVETIRACETAM 500 MG: 500 TABLET, FILM COATED ORAL at 09:48

## 2022-08-15 RX ADMIN — ATORVASTATIN CALCIUM 40 MG: 40 TABLET, FILM COATED ORAL at 09:48

## 2022-08-15 NOTE — PHYSICAL THERAPY NOTE
Physical Therapy Treatment Note    Patient Name: Cathie Galvez    Diagnosis: Atrial fibrillation ([facility] ) [I48 91]  Atrial fibrillation with rapid ventricular response ([address] ) [I48 91]  New onset seizure ([facility] ) [R56 9]  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, unspecified hemiplegia type ([facility] ) [I69 351]     08/15/22 1133   PT Last Visit   PT Visit Date 08/15/22   Note Type   Note Type Treatment   Pain Assessment   Pain Assessment Tool 0-10   Pain Score 5   Pain Location/Orientation Orientation: Right;Orientation: Upper; Location: Leg   Hospital Pain Intervention(s) Repositioned; Ambulation/increased activity   Restrictions/Precautions   Weight Bearing Precautions Per Order No   Other Precautions Chair Alarm; Bed Alarm; Fall Risk;Pain;Seizure  (expressive aphasia)   General   Chart Reviewed Yes   Response to Previous Treatment Patient with no complaints from previous session  Family/Caregiver Present No   Cognition   Overall Cognitive Status WFL   Arousal/Participation Alert; Responsive; Cooperative   Attention Attends with cues to redirect   Orientation Level Oriented to person;Oriented to place;Oriented to situation   Memory Decreased recall of recent events;Decreased short term memory   Following Commands Follows one step commands without difficulty   Comments Pt agreeable to PT  Subjective   Subjective "Rejeana Lites "   Bed Mobility   Additional Comments Pt was received seated in recliner in NAD  Transfers   Sit to Stand 4  Minimal assistance   Additional items Assist x 1; Increased time required;Verbal cues   Stand to Sit 4  Minimal assistance   Additional items Assist x 1; Armrests; Increased time required;Verbal cues   Ambulation/Elevation   Gait pattern Decreased toe off;Decreased heel strike;Decreased hip extension; Excessively slow; Step to;Short stride; Shuffling;Decreased R stance; Improper Weight shift;Decreased foot clearance   Gait Assistance 4  Minimal assist   Additional items Assist x 1;Verbal cues   Assistive Device Small base quad cane   Distance 60 feet   Stair Management Assistance Not tested   Balance   Static Sitting Fair +   Dynamic Sitting Fair   Static Standing Fair -   Dynamic Standing Poor +   Ambulatory Poor +   Endurance Deficit   Endurance Deficit Yes   Endurance Deficit Description decreased activity tolerance   Activity Tolerance   Activity Tolerance Patient tolerated treatment well   Exercises   Quad Sets Sitting;20 reps;AROM; Bilateral  (long sitting)   Glute Sets Sitting;20 reps;AROM; Bilateral   Hip Flexion Sitting;20 reps;AROM; Bilateral   Hip Abduction Sitting;20 reps;AROM; Bilateral  (long sitting)   Hip Adduction Sitting;20 reps;AROM; Bilateral   Knee AROM Long Arc Quad Sitting;AROM;10 reps;Right;20 reps; Left   Ankle Pumps Sitting;20 reps;AROM; Bilateral   Assessment   Prognosis Good   Problem List Decreased strength;Decreased endurance; Impaired balance;Decreased mobility; Decreased coordination;Pain   Assessment Chart reviewed  Patient was received seated in recliner in [facility] and agreeable to PT session  Today's PT treatment session consisted of therapeutic activity for facilitation of transitional movements and safe performance of correct technique for sit to stand transfers, therapeutic exercise to increase lower extremity muscle strength, and gait training to promote safe and functional ambulation on level surfaces  In comparison to the previous session the patient has made progress as evident by ability to ambulate a slightly increased distance with use of quad cane  When ambulating pt exhibits decreased right stance time, decreased darya, decreased right heel strike, and decreased right step length  Pt with intermittent complaints of right upper leg/thigh pain with ambulation and seated therapeutic exercise  Pt tolerated all therapeutic exercise well  Overall, patient tolerated today's session well and continues to be making progress towards achieving her STG's   Patient's prognosis for achieving their STG's is good as evident by pt's motivation  PT intervention continues to be appropriate as the patient continues to be limited by pain, decreased lower extremity strength, impaired balance, decreased endurance, gait deviations, and decreased functional mobility  Continue to recommend STR  PT to continue to see patient in order to address the deficits listed above and provide interventions consistent with the POC in order to achieve STG's and optimize the patient's independence with functional mobility  Barriers to Discharge Other (Comment)  (decline in functional status)   Goals   STG Expiration Date 08/18/22   Plan   Treatment/Interventions Functional transfer training;LE strengthening/ROM; Therapeutic exercise; Endurance training;Patient/family training;Bed mobility;Gait training;OT   Progress Progressing toward goals   PT Frequency 3-5x/wk   Recommendation   PT Discharge Recommendation Post acute rehabilitation services   AM-PAC Basic Mobility Inpatient   Turning in Bed Without Bedrails 3   Lying on Back to Sitting on Edge of Flat Bed 3   Moving Bed to Chair 3   Standing Up From Chair 3   Walk in Room 3   Climb 3-5 Stairs 2   Basic Mobility Inpatient Raw Score 17   Basic Mobility Standardized Score 39 67   Highest Level Of Mobility   JH-HLM Goal 5: Stand one or more mins   JH-HLM Achieved 7: Walk 25 feet or more   Education   Education Provided Mobility training;Home exercise program;Assistive device   Patient Reinforcement needed;Demonstrates acceptance/verbal understanding   End of Consult   Patient Position at End of Consult Bedside chair;Bed/Chair alarm activated; All needs within reach     Maxine Hager PT, DPT    Time of PT treatment session: [id number]  24 minutes

## 2022-08-15 NOTE — CASE MANAGEMENT
Case Management Discharge Planning Note    Patient name Jesse Victor  Location [address] 306/-22 MRN [id number]  : 1960 Date 8/15/2022       Current Admission Date: 2022  Current Admission Diagnosis:New onset seizure [facility])   Patient Active Problem List    Diagnosis Date Noted    Thrombocytopenia ([facility] ) 2022    Atrial fibrillation with RVR ([facility] ) 2022    New onset seizure ([facility] ) 2022    Aspiration pneumonia ([facility] ) 2022    Hemiplegia ([facility] ) 10/15/2021    Mixed hyperlipidemia 10/15/2021    Paroxysmal atrial fibrillation ([place] [address] ) 10/15/2021    Acute ischemic left MCA stroke ([facility] ) 10/14/2021      LOS (days): 8  Geometric Mean LOS (GMLOS) (days): 4 30  Days to GMLOS:-3 7     OBJECTIVE:  Risk of Unplanned Readmission Score: 9 31         Current admission status: Inpatient   Preferred Pharmacy:   [id number] [address] [address]  Phone: [phone] Fax: [phone]    Primary Care Provider: No primary care provider on file  Primary Insurance: [facility]  Secondary Insurance:     DISCHARGE DETAILS:    Discharge planning discussed with[de-identified] pt  Freedom of Choice: Yes  Comments - Freedom of Choice: Made pt aware that auth for STR/SNF placement at [facility] has been obtained  Pt agreeable to accepting bed offer  Transport requested for after 1600;  confirmed p/u time is for 1800  Nurse Marianna Egan, Dr Armond Montana, luz maria Whittaker at [email or web address] made aware  Order obtained for COVID test;  results obtained and sent via [address]  PASSR completed and sent via email to Louise/Admissions at [email or web address]    Facility info added to AVS   CM contacted family/caregiver?: Yes             Contacts  Patient Contacts: Claudio Kendrick  Relationship to Patient[de-identified] Family  Contact Method: Phone  Phone Number: [phone]  Reason/Outcome: Continuity of Care, Discharge [address]         Is the patient interested in [facility] at discharge?: No    DME Referral Provided  Referral made for DME?: No    Other Referral/Resources/Interventions Provided:  Interventions: SNF, Short Term Rehab  Referral Comments: Auth info, PASSR and COVID test results sent to [facility]  Pt and son Gustavo Khan agreeable to accepting bed  1800 P/U arranged for pt w [facility]           Treatment Team Recommendation: Short Term Rehab, SNF  Discharge Destination Plan[de-identified] SNF, Short Term Rehab  Transport at Discharge : [facility] Ambulance           ETA of Transport (Date): 08/15/22  ETA of Transport (Time): [address] Name, [address] : [facility]  Receiving Facility/Agency Phone Number: phone:  [phone]  Facility/Agency Fax Number: Fax: [phone]

## 2022-08-15 NOTE — PLAN OF CARE
Problem: PHYSICAL THERAPY ADULT  Goal: Performs mobility at highest level of function for planned discharge setting  See evaluation for individualized goals  Description: Treatment/Interventions: Functional transfer training, LE strengthening/ROM, Therapeutic exercise, Endurance training, Patient/family training, Bed mobility, Gait training, OT, Spoke to nursing          See flowsheet documentation for full assessment, interventions and recommendations  Outcome: Progressing  Note: Prognosis: Good  Problem List: Decreased strength, Decreased endurance, Impaired balance, Decreased mobility, Decreased coordination, Pain  Assessment: Chart reviewed  Patient was received seated in recliner in [facility] and agreeable to PT session  Today's PT treatment session consisted of therapeutic activity for facilitation of transitional movements and safe performance of correct technique for sit to stand transfers, therapeutic exercise to increase lower extremity muscle strength, and gait training to promote safe and functional ambulation on level surfaces  In comparison to the previous session the patient has made progress as evident by ability to ambulate a slightly increased distance with use of quad cane  When ambulating pt exhibits decreased right stance time, decreased darya, decreased right heel strike, and decreased right step length  Pt with intermittent complaints of right upper leg/thigh pain with ambulation and seated therapeutic exercise  Pt tolerated all therapeutic exercise well  Overall, patient tolerated today's session well and continues to be making progress towards achieving her STG's  Patient's prognosis for achieving their STG's is good as evident by pt's motivation  PT intervention continues to be appropriate as the patient continues to be limited by pain, decreased lower extremity strength, impaired balance, decreased endurance, gait deviations, and decreased functional mobility  Continue to recommend STR  PT to continue to see patient in order to address the deficits listed above and provide interventions consistent with the POC in order to achieve STG's and optimize the patient's independence with functional mobility  Barriers to Discharge: Other (Comment) (decline in functional status)     PT Discharge Recommendation: Post acute rehabilitation services    See flowsheet documentation for full assessment

## 2022-08-15 NOTE — PLAN OF CARE
Problem: OCCUPATIONAL THERAPY ADULT  Goal: Performs self-care activities at highest level of function for planned discharge setting  See evaluation for individualized goals  Description: Treatment Interventions: ADL retraining, Functional transfer training, UE strengthening/ROM, Endurance training, Patient/family training, Equipment evaluation/education, Fine motor coordination activities, Compensatory technique education, UE splinting, Activityengagement          See flowsheet documentation for full assessment, interventions and recommendations  Note: Limitation: Decreased ADL status, Decreased UE ROM, Decreased UE strength, Decreased endurance, Decreased fine motor control, Decreased self-care trans, Decreased high-level ADLs  Prognosis: Good  Assessment: Patient participated in Skilled OT session this date with interventions consisting of ADL re training with the use of correct body mechnaics, Energy Conservation techniques, Work simplification skills , one handed dressing technique,  therapeutic activities to: increase activity tolerance, increase standing tolerance time with unilateral UE support to complete sink level ADLs, increase cardiovascular endurance , increase dynamic sit/ stand balance during functional activity , increase postural control, increase trunk control and increase OOB/ sitting tolerance   Patient agreeable to OT treatment session, upon arrival patient was found supine in bed, alert, responsive  and in no apparent distress  In comparison to previous session, patient with improvements in overall activity tolerance and endurance, requires min assist for UB ADLs, mod assist for LB ADLs  Deficits affecting self performance include: dynamic sitting and standing balance, standing tolerance time, gross motor coordination, trunk mobility and postural control deficit  Patient requiring verbal cues for safety and verbal cues for correct technique   Patient continues to be functioning below baseline level, occupational performance remains limited secondary to factors listed above and increased risk for falls and injury  From OT standpoint, recommendation at time of d/c would be Short Term Rehab  Patient to benefit from continued Occupational Therapy treatment while in the hospital to address deficits as defined above and maximize level of functional independence with ADLs and functional mobility       OT Discharge Recommendation: Post acute rehabilitation services

## 2022-08-15 NOTE — CASE MANAGEMENT
Case Management Discharge Planning Note    Patient name Brody Reza  Location [phone] MRN [id number]  : 1960 Date 8/15/2022       Current Admission Date: 2022  Current Admission Diagnosis:New onset seizure [facility])   Patient Active Problem List    Diagnosis Date Noted    [facility]rombocytopenia ([facility] ) 2022    Atrial fibrillation with RVR ([facility] ) 2022    New onset seizure ([facility] ) 2022    Aspiration pneumonia ([facility] ) 2022    Hemiplegia ([facility] ) 10/15/2021    Mixed hyperlipidemia 10/15/2021    Paroxysmal atrial fibrillation ([facility] ) 10/15/2021    Acute ischemic left MCA stroke ([facility] ) 10/14/2021      LOS (days): 8  Geometric Mean LOS (GMLOS) (days): 4 30  Days to GMLOS:-3 5     OBJECTIVE:  Risk of Unplanned Readmission Score: 9 29         Current admission status: Inpatient   Preferred Pharmacy:   [id number] [place], [place] - [address]  [address] [facility] [address]  Phone: [phone] Fax: [phone]    Primary Care Provider: No primary care provider on file  Primary Insurance: Luis Hudson  Secondary Insurance:     DISCHARGE DETAILS:                                                                                                               Facility Insurance Auth Number: approved SNF auth # [id number] for [employer]: 8/15  NRD:   Care Coord: Ingrid Chopra  P: [phone]   F: [phone]

## 2022-08-15 NOTE — PLAN OF CARE
Problem: MOBILITY - ADULT  Goal: Maintain or return to baseline ADL function  Description: INTERVENTIONS:  -  Assess patient's ability to carry out ADLs; assess patient's baseline for ADL function and identify physical deficits which impact ability to perform ADLs (bathing, care of mouth/teeth, toileting, grooming, dressing, etc )  - Assess/evaluate cause of self-care deficits   - Assess range of motion  - Assess patient's mobility; develop plan if impaired  - Assess patient's need for assistive devices and provide as appropriate  - Encourage maximum independence but intervene and supervise when necessary  - Involve family in performance of ADLs  - Assess for home care needs following discharge   - Consider OT consult to assist with ADL evaluation and planning for discharge  - Provide patient education as appropriate  Outcome: Progressing  Goal: Maintains/Returns to pre admission functional level  Description: INTERVENTIONS:  - Perform BMAT or MOVE assessment daily    - Set and communicate daily mobility goal to care team and patient/family/caregiver  - Collaborate with rehabilitation services on mobility goals if consulted  - Perform Range of Motion 2 times a day  - Reposition patient every 2 hours    - Dangle patient   - Stand patient   - Ambulate patient   - Out of bed to chair    - Out of bed for meals   - Out of bed for toileting  - Record patient progress and toleration of activity level   Outcome: Progressing     Problem: Prexisting or High Potential for Compromised Skin Integrity  Goal: Skin integrity is maintained or improved  Description: INTERVENTIONS:  - Identify patients at risk for skin breakdown  - Assess and monitor skin integrity  - Assess and monitor nutrition and hydration status  - Monitor labs   - Assess for incontinence   - Turn and reposition patient  - Assist with mobility/ambulation  - Relieve pressure over bony prominences  - Avoid friction and shearing  - Provide appropriate hygiene as needed including keeping skin clean and dry  - Evaluate need for skin moisturizer/barrier cream  - Collaborate with interdisciplinary team   - Patient/family teaching  - Consider wound care consult   Outcome: Progressing     Problem: Potential for Falls  Goal: Patient will remain free of falls  Description: INTERVENTIONS:  - Educate patient/family on patient safety including physical limitations  - Instruct patient to call for assistance with activity   - Consult OT/PT to assist with strengthening/mobility   - Keep Call bell within reach  - Keep bed low and locked with side rails adjusted as appropriate  - Keep care items and personal belongings within reach  - Initiate and maintain comfort rounds  - Make Fall Risk Sign visible to staff  - Offer Toileting every 2 Hours, in advance of need  - Initiate/Maintain alarm  - Obtain necessary fall risk management equipment  - Apply yellow socks and bracelet for high fall risk patients  - Consider moving patient to room near nurses station  Outcome: Progressing     Problem: PAIN - ADULT  Goal: Verbalizes/displays adequate comfort level or baseline comfort level  Description: Interventions:  - Encourage patient to monitor pain and request assistance  - Assess pain using appropriate pain scale  - Administer analgesics based on type and severity of pain and evaluate response  - Implement non-pharmacological measures as appropriate and evaluate response  - Consider cultural and social influences on pain and pain management  - Notify physician/advanced practitioner if interventions unsuccessful or patient reports new pain  Outcome: Progressing     Problem: INFECTION - ADULT  Goal: Absence or prevention of progression during hospitalization  Description: INTERVENTIONS:  - Assess and monitor for signs and symptoms of infection  - Monitor lab/diagnostic results  - Monitor all insertion sites, i e  indwelling lines, tubes, and drains  - Monitor endotracheal if appropriate and nasal secretions for changes in amount and color  - [place] appropriate cooling/warming therapies per order  - Administer medications as ordered  - Instruct and encourage patient and family to use good hand hygiene technique  - Identify and instruct in appropriate isolation precautions for identified infection/condition  Outcome: Progressing     Problem: SAFETY ADULT  Goal: Maintain or return to baseline ADL function  Description: INTERVENTIONS:  -  Assess patient's ability to carry out ADLs; assess patient's baseline for ADL function and identify physical deficits which impact ability to perform ADLs (bathing, care of mouth/teeth, toileting, grooming, dressing, etc )  - Assess/evaluate cause of self-care deficits   - Assess range of motion  - Assess patient's mobility; develop plan if impaired  - Assess patient's need for assistive devices and provide as appropriate  - Encourage maximum independence but intervene and supervise when necessary  - Involve family in performance of ADLs  - Assess for home care needs following discharge   - Consider OT consult to assist with ADL evaluation and planning for discharge  - Provide patient education as appropriate  Outcome: Progressing  Goal: Maintains/Returns to pre admission functional level  Description: INTERVENTIONS:  - Perform BMAT or MOVE assessment daily    - Set and communicate daily mobility goal to care team and patient/family/caregiver  - Collaborate with rehabilitation services on mobility goals if consulted  - Perform Range of Motion 2 times a day  - Reposition patient every 2 hours    - Dangle patient   - Stand patient   - Ambulate patient   - Out of bed to chair    - Out of bed for meals   - Out of bed for toileting  - Record patient progress and toleration of activity level   Outcome: Progressing  Goal: Patient will remain free of falls  Description: INTERVENTIONS:  - Educate patient/family on patient safety including physical limitations  - Instruct patient to call for assistance with activity   - Consult OT/PT to assist with strengthening/mobility   - Keep Call bell within reach  - Keep bed low and locked with side rails adjusted as appropriate  - Keep care items and personal belongings within reach  - Initiate and maintain comfort rounds  - Make Fall Risk Sign visible to staff  - Offer Toileting every 2 Hours, in advance of need  - Initiate/Maintain alarm  - Obtain necessary fall risk management equipment  - Apply yellow socks and bracelet for high fall risk patients  - Consider moving patient to room near nurses station  Outcome: Progressing     Problem: DISCHARGE PLANNING  Goal: Discharge to home or other facility with appropriate resources  Description: INTERVENTIONS:  - Identify barriers to discharge w/patient and caregiver  - Arrange for needed discharge resources and transportation as appropriate  - Identify discharge learning needs (meds, wound care, etc )  - Arrange for interpretive services to assist at discharge as needed  - Refer to Case Management Department for coordinating discharge planning if the patient needs post-hospital services based on physician/advanced practitioner order or complex needs related to functional status, cognitive ability, or social support system  Outcome: Progressing     Problem: Knowledge Deficit  Goal: Patient/family/caregiver demonstrates understanding of disease process, treatment plan, medications, and discharge instructions  Description: Complete learning assessment and assess knowledge base    Interventions:  - Provide teaching at level of understanding  - Provide teaching via preferred learning methods  Outcome: Progressing     Problem: NEUROSENSORY - ADULT  Goal: Achieves stable or improved neurological status  Description: INTERVENTIONS  - Monitor and report changes in neurological status  - Monitor vital signs such as temperature, blood pressure, glucose, and any other labs ordered   - Initiate measures to prevent increased intracranial pressure  - Monitor for seizure activity and implement precautions if appropriate      Outcome: Progressing  Goal: Remains free of injury related to seizures activity  Description: INTERVENTIONS  - Maintain airway, patient safety  and administer oxygen as ordered  - Monitor patient for seizure activity, document and report duration and description of seizure to physician/advanced practitioner  - If seizure occurs,  ensure patient safety during seizure  - Reorient patient post seizure  - Seizure pads on all 4 side rails  - Instruct patient/family to notify RN of any seizure activity including if an aura is experienced  - Instruct patient/family to call for assistance with activity based on nursing assessment  - Administer anti-seizure medications if ordered    Outcome: Progressing  Goal: Achieves maximal functionality and self care  Description: INTERVENTIONS  - Monitor swallowing and airway patency with patient fatigue and changes in neurological status  - Encourage and assist patient to increase activity and self care     - Encourage visually impaired, hearing impaired and aphasic patients to use assistive/communication devices  Outcome: Progressing

## 2022-08-15 NOTE — OCCUPATIONAL THERAPY NOTE
Occupational Therapy Treatment         Patient Name: Bertrand Patel  Today's Date: 8/15/2022       08/15/22 0947   OT Last Visit   OT Visit Date 08/15/22   Note Type   Note Type Treatment   Restrictions/Precautions   Weight Bearing Precautions Per Order No   Pain Assessment   Pain Assessment Tool 0-10   Pain Score No Pain   ADL   Eating Assistance 5  Supervision/Setup   Eating Deficit Increased time to complete   Grooming Assistance 5  Supervision/Setup   Grooming Deficit Setup;Steadying; Increased time to complete   UB Bathing Assistance 4  Minimal Assistance   UB Bathing Deficit Increased time to complete   LB Bathing Assistance 3  Moderate Assistance   LB Bathing Deficit Right lower leg including foot; Left lower leg including foot; Buttocks   UB Dressing Assistance 4  Minimal Assistance   UB Dressing Deficit Setup; Increased time to complete;Supervision/safety;Pull around back;Pull down in back   LB Dressing Assistance 3  Moderate Assistance   LB Dressing Deficit Don/doff R sock; Don/doff L sock   Toileting Assistance  3  Moderate Assistance   Toileting Deficit Setup;Verbal cueing;Supervison/safety; Increased time to complete   Functional Standing Tolerance   Time 3 minutes   Activity sink level ADLs   Bed Mobility   Supine to Sit 4  Minimal assistance   Additional items Assist x 1; Increased time required; Bedrails;Verbal cues;LE management   Transfers   Sit to Stand 4  Minimal assistance   Additional items Assist x 1; Armrests; Increased time required;Verbal cues   Stand to Sit 4  Minimal assistance   Additional items Assist x 1; Increased time required;Verbal cues   Functional Mobility   Functional Mobility 4  Minimal assistance   Additional items   (quad cane)   Toilet Transfers   Toilet Transfer From [employer] Transfer Type To and from   Toilet Transfer to Standard toilet   Toilet Transfer Technique Ambulating   Toilet Transfers Minimal assistance   Cognition   Overall Cognitive Status [facility] Arousal/Participation Alert; Responsive; Cooperative   Attention Attends with cues to redirect   Orientation Level Oriented to person;Oriented to place;Oriented to situation   Memory Decreased recall of recent events;Decreased short term memory   Following Commands Follows one step commands without difficulty   Activity Tolerance   Activity Tolerance Patient limited by fatigue   Assessment   Assessment Patient participated in Skilled OT session this date with interventions consisting of ADL re training with the use of correct body mechnaics, Energy Conservation techniques, Work simplification skills , one handed dressing technique,  therapeutic activities to: increase activity tolerance, increase standing tolerance time with unilateral UE support to complete sink level ADLs, increase cardiovascular endurance , increase dynamic sit/ stand balance during functional activity , increase postural control, increase trunk control and increase OOB/ sitting tolerance   Patient agreeable to OT treatment session, upon arrival patient was found supine in bed, alert, responsive  and in no apparent distress  In comparison to previous session, patient with improvements in overall activity tolerance and endurance, requires min assist for UB ADLs, mod assist for LB ADLs  Deficits affecting self performance include: dynamic sitting and standing balance, standing tolerance time, gross motor coordination, trunk mobility and postural control deficit  Patient requiring verbal cues for safety and verbal cues for correct technique  Patient continues to be functioning below baseline level, occupational performance remains limited secondary to factors listed above and increased risk for falls and injury  From OT standpoint, recommendation at time of d/c would be Short Term Rehab     Patient to benefit from continued Occupational Therapy treatment while in the hospital to address deficits as defined above and maximize level of functional independence with ADLs and functional mobility  Plan   Treatment Interventions ADL retraining;Functional transfer training;UE strengthening/ROM; Endurance training;Cognitive reorientation;Patient/family training;Equipment evaluation/education; Compensatory technique education;Continued evaluation; Energy conservation; Activityengagement   Goal Expiration Date 08/30/22   OT Treatment Day 1   OT Frequency 3-5x/wk   Recommendation   OT Discharge Recommendation Post acute rehabilitation services   AM-PAC Daily Activity Inpatient   Lower Body Dressing 2   Bathing 2   Toileting 2   Upper Body Dressing 3   Grooming 3   Eating 3   Daily Activity Raw Score 15   Daily Activity Standardized Score (Calc for Raw Score >=11) 34 69   AM-PAC Applied Cognition Inpatient   Following a Speech/Presentation 4   Understanding Ordinary Conversation 4   Taking Medications 2   Remembering Where Things Are Placed or Put Away 2   Remembering List of 4-5 Errands 3   Taking Care of Complicated Tasks 3   Applied Cognition Raw Score 18   Applied Cognition Standardized Score 38 07

## 2022-08-17 NOTE — UTILIZATION REVIEW
Trista Briceño DO   Physician   Hospitalist   Discharge Summary       Signed   Date of Service:  8/11/2022  7:26 AM                 Signed              Show:Clear all  [x]Manual[x]Template[x]Copied    Added by:  [x]Nathen Tony 229, PATRAM[x]MARTIN Beckett      []Matt for details    [facility]  Discharge- Skinner Sa 1960, 63 y o  female MRN: [id number]  Unit/Bed#: -01 Encounter: [id number]  Primary Care Provider: No primary care provider on file  Date and time admitted to hospital: 8/7/2022  2:23 PM     * New onset seizure [facility])  Assessment & Plan  · Patient came in with witnessed seizure at home  At the time EMS arrived the patient started to wake up but was initially confused and somnolent  ? Patient does have past medical history CVA with right-sided weakness and some expressive aphasia  ? CT head was normal, MRI brain negative for acute ischemia  ? Neurology consulted, EEG without epileptiform waves  ? Continue Keppra  ? Seizure precautions  ? PT/OT consults noted - STR recommended  § Plan to discharge to rehab     Thrombocytopenia ([facility] )  Assessment & Plan  · No s/sx bleeding or abnormal bruising   · Etiology unclear, though appears chronic and stable      Aspiration pneumonia ([facility] )  Assessment & Plan  · Concern for aspiration subsequent to the seizure episode  · Given ceftriaxone and fluids in ER - hold on further  · Procal is improving without intervention  · Patient remains afebrile, with no leukocytosis   · CXR negative for infiltrate     Atrial fibrillation with RVR (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, expected to resolve   · Physical and occupational therapy to continue            Medical Problems                  Resolved Problems  Date Reviewed: 8/14/2022   None                   Discharging Physician / Practitioner: Denisse Galloway DO  PCP: No primary care provider on file  Admission Date:   Admission Orders (From admission, onward)              Ordered          08/07/22 1535   INPATIENT ADMISSION  Once                          Discharge Date: 08/15/22     Consultations During Hospital Stay:  · Neurology     Complications:  none     Reason for Admission:  Seizure-like activity     Hospital Course:   Lizy Esquivel is a 63 y o  female patient who originally presented to the hospital on 8/7/2022 due to seizure-like activity  Patient was seen by Neurology team during hospitalization and started on anti epileptic medicati complaints on exam   on   Patient also had MRI which did not show any acute strokes but did show abnormality within left internal auditory canal   Patient will need outpatient MRI to follow-up with primary care provider and Neurology  Patient was seen by PT and OT who recommended rehab and patient will be discharged to rehab at this time  Patient had no further questions on exam      Please see above list of diagnoses and related plan for additional information       Condition at Discharge: stable     Discharge Day Visit / Exam:   Subjective:  Patient resting comfortably on examination  Patient had no overnight events or  Vitals: Blood Pressure: 99/66 (08/15/22 1456)  Pulse: (!) 47 (08/15/22 1456)  Temperature: 98 3 °F (36 8 °C) (08/15/22 1456)  Temp Source: Oral (08/09/22 0800)  Respirations: 18 (08/14/22 2137)  Height: 5' 8" (172 7 cm) (08/12/22 2300)  Weight - Scale: 66 5 kg (146 lb 9 7 oz) (08/12/22 2300)  SpO2: 93 % (08/15/22 1456)  Exam:   Physical Exam  Vitals and nursing note reviewed  Constitutional:       General: She is not in acute distress       Appearance: She is well-developed  HENT:      Head: Normocephalic and atraumatic  Eyes:      General: No scleral icterus  Conjunctiva/sclera: Conjunctivae normal    Cardiovascular:      Rate and Rhythm: Normal rate and regular rhythm  Heart sounds: Normal heart sounds  No murmur heard  No friction rub  No gallop  Pulmonary:      Effort: Pulmonary effort is normal  No respiratory distress  Breath sounds: Normal breath sounds  No wheezing or rales  Abdominal:      General: Bowel sounds are normal  There is no distension  Palpations: Abdomen is soft  Tenderness: There is no abdominal tenderness  Musculoskeletal:         General: Normal range of motion  Skin:     General: Skin is warm  Findings: No rash  Neurological:      Mental Status: She is alert and oriented to person, place, and time           Discussion with Family: Patient declined call to        Discharge instructions/Information to patient and family:   See after visit summary for information provided to patient and family        Provisions for Follow-Up Care:  See after visit summary for information related to follow-up care and any pertinent home health orders  Disposition:   Acute Rehab at rehab     Planned Readmission: none     Discharge Statement:  I spent 35 minutes discharging the patient  This time was spent on the day of discharge  I had direct contact with the patient on the day of discharge  Greater than 50% of the total time was spent examining patient, answering all patient questions, arranging and discussing plan of care with patient as well as directly providing post-discharge instructions    Additional time then spent on discharge activities      Discharge Medications:  See after visit summary for reconciled discharge medications provided to patient and/or family        **Please Note: This note may have been constructed using a voice recognition system**                   Revision History

## 2022-08-23 ENCOUNTER — TELEPHONE (OUTPATIENT)
Dept: NEUROLOGY | Facility: CLINIC | Age: 62
End: 2022-08-23

## 2022-08-23 NOTE — TELEPHONE ENCOUNTER
[facility], HEMIPLEGIA            NOTES: MOST RECENT NOTE FROM 8/10: Ac Grewal will need follow up in 6-8 weeks with epilepsy attending or AP  Can also follow up with general attending or AP  Garret Samuel She will require a MRI IAC outpatient  SCHEDULED: 11/3/22 @ 10:30AM W/RAYMOND BARRERA  [address] [place]  ADDED TO THE WAIT LIST  WILL SEND PATIENT A LETTER W/APPOINTMENT DETAILS/DIRECTIONS

## 2022-09-14 ENCOUNTER — VBI (OUTPATIENT)
Dept: ADMINISTRATIVE | Facility: OTHER | Age: 62
End: 2022-09-14

## 2022-09-18 NOTE — PROGRESS NOTES
Name: Sherice Bryant      : 1960      MRN: [id number]  Encounter Provider: Blas Baptiste MD  Encounter Date: 2022   Encounter department: [address] [place] [address] Place     1  Acute ischemic left MCA stroke [facility])  Assessment & Plan:  Patient is stable  and will continue present plan of care and reassess at next routine visit  All questions about this problem from patient were answered today  2  Atrial fibrillation with RVR (HCC)  Assessment & Plan:  Continue with anticogulation and rate control of heart rate  Concerns about condition were addressed today  3  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, unspecified hemiplegia type [facility])  Assessment & Plan:  Patient is stable  and will continue present plan of care and reassess at next routine visit  All questions about this problem from patient were answered today  4  Mixed hyperlipidemia  Assessment & Plan:  Patient  is stable with current medication and we discussed a low fat low cholesterol diet  Weight loss also discussed for this will help lower cholesterol also  Recheck lipids in 6 months  5  New onset seizure [facility])  Assessment & Plan:  Patient is stable  and will continue present plan of care and reassess at next routine visit  All questions about this problem from patient were answered today  6  Need for hepatitis C screening test    7  Encounter for immunization    8  Screening for HIV (human immunodeficiency virus)    9  Screening for cervical cancer    10  Encounter for screening mammogram for breast cancer           Subjective     This 21-year-old female here today for checkup for post hospital checkup as well as a skilled nursing facility checkup  Patient about a month ago had a seizure which is probably due to her previous stroke she has had    The patient was started on Keppra and has just gotten out of rehab from skilled nursing and is now home with physical therapy  Patient accompanied by family and states that she is doing well  Patient has improved globally in all of her functionality that was affected by her stroke  She is having better speech she is walking better with her home though she still has some impairment but she has improved  Patient with no more seizure activity and is stable with her Keppra  Patient be following with Neurology soon  Discussed with patient about a flu shot should decided which to hold off for now and  in the future  To decide  Patient has improved since her stroke however she is permanently disabled  Patient will no will to drive a car she does have limited speech that is somewhat improved but 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 with her lack of communication skills due to her stroke  Patient seizure activity also does not help her cause either  Review of Systems   Constitutional: Negative for activity change, appetite change, fatigue and fever  HENT: Negative for congestion, ear pain, postnasal drip, rhinorrhea, sinus pressure, sinus pain, sneezing and sore throat  Eyes: Negative for pain and redness  Respiratory: Negative for apnea, cough, chest tightness, shortness of breath and wheezing  Cardiovascular: Negative for chest pain, palpitations and leg swelling  Gastrointestinal: Negative for abdominal pain, constipation, diarrhea, nausea and vomiting  Endocrine: Negative for cold intolerance and heat intolerance  Genitourinary: Negative for difficulty urinating, dysuria, frequency, hematuria and urgency  Musculoskeletal: Negative for arthralgias, back pain, gait problem and myalgias  Skin: Negative for rash  Neurological: Negative for dizziness, speech difficulty, weakness, numbness and headaches  Hematological: Does not bruise/bleed easily  Psychiatric/Behavioral: Negative for agitation, confusion and hallucinations         Past Medical History:   Diagnosis Date    Stroke [facility])      Past Surgical History:   Procedure Laterality Date    OVARY SURGERY       History reviewed  No pertinent family history  Social History     Socioeconomic History    Marital status: Single     Spouse name: None    Number of children: None    Years of education: None    Highest education level: None   Occupational History    None   Tobacco Use    Smoking status: Former Smoker    Smokeless tobacco: Never Used   Vaping Use    Vaping Use: Never used   Substance and Sexual Activity    Alcohol use: Not Currently    Drug use: Not Currently    Sexual activity: None   Other Topics Concern    None   Social History Narrative    None     Social Determinants of Health     Financial Resource Strain: Not on file   Food Insecurity: No Food Insecurity    Worried About Running Out of Food in the Last Year: Never true    Tanisha of Food in the Last Year: Never true   Transportation Needs: No Transportation Needs    Lack of Transportation (Medical): No    Lack of Transportation (Non-Medical):  No   Physical Activity: Not on file   Stress: Not on file   Social Connections: Not on file   Intimate Partner Violence: Not on file   Housing Stability: Low Risk     Unable to Pay for Housing in the Last Year: No    Number of Places Lived in the Last Year: 1    Unstable Housing in the Last Year: No     Current Outpatient Medications on File Prior to Visit   Medication Sig    apixaban (ELIQUIS) 5 mg Take 1 tablet (5 mg total) by mouth 2 (two) times a day    atorvastatin (LIPITOR) 40 mg tablet TAKE 1 TABLET BY MOUTH EVERY DAY    levETIRAcetam (KEPPRA) 500 mg tablet Take 1 tablet (500 mg total) by mouth every 12 (twelve) hours    metoprolol succinate (Toprol XL) 25 mg 24 hr tablet Take 1 tablet (25 mg total) by mouth daily    sertraline (ZOLOFT) 50 mg tablet TAKE ONE TABLET BY MOUTH DAILY    thiamine 100 MG tablet Take 100 mg by mouth daily     No Known Allergies    There is no immunization history on file for this patient  Objective     BP 98/70 (BP Location: Left arm, Patient Position: Sitting, Cuff Size: Standard)   Pulse (!) 46   Temp 97 7 °F (36 5 °C) (Temporal)   Resp 16   Ht 5' 8" (1 727 m)   Wt 61 2 kg (135 lb)   SpO2 96%   BMI 20 53 kg/m²     Physical Exam  Vitals and nursing note reviewed  Constitutional:       Appearance: She is well-developed  HENT:      Head: Normocephalic and atraumatic  Nose: Nose normal       Mouth/Throat:      Mouth: Mucous membranes are moist    Eyes:      General: No scleral icterus  Conjunctiva/sclera: Conjunctivae normal       Pupils: Pupils are equal, round, and reactive to light  Neck:      Thyroid: No thyromegaly  Cardiovascular:      Rate and Rhythm: Normal rate and regular rhythm  Pulmonary:      Effort: Pulmonary effort is normal       Breath sounds: Normal breath sounds  No wheezing  Abdominal:      General: Bowel sounds are normal  There is no distension  Palpations: Abdomen is soft  Tenderness: There is no abdominal tenderness  There is no guarding or rebound  Musculoskeletal:         General: No tenderness or deformity  Normal range of motion  Cervical back: Normal range of motion and neck supple  Skin:     General: Skin is warm and dry  Findings: No erythema or rash  Neurological:      Mental Status: She is alert and oriented to person, place, and time  Sensory: No sensory deficit  Motor: Weakness present  Gait: Gait abnormal       Comments: R sided weakness and cane to ambulate in the office  Psychiatric:         Behavior: Behavior normal          Thought Content:  Thought content normal          Judgment: Judgment normal        Dick Crawford MD

## 2022-09-19 ENCOUNTER — OFFICE VISIT (OUTPATIENT)
Dept: FAMILY MEDICINE CLINIC | Facility: CLINIC | Age: 62
End: 2022-09-19
Payer: COMMERCIAL

## 2022-09-19 VITALS
HEART RATE: 46 BPM | TEMPERATURE: 97.7 F | HEIGHT: 68 IN | WEIGHT: 135 LBS | DIASTOLIC BLOOD PRESSURE: 70 MMHG | BODY MASS INDEX: 20.46 KG/M2 | OXYGEN SATURATION: 96 % | SYSTOLIC BLOOD PRESSURE: 98 MMHG | RESPIRATION RATE: 16 BRPM

## 2022-09-19 DIAGNOSIS — Z12.4 SCREENING FOR CERVICAL CANCER: ICD-10-CM

## 2022-09-19 DIAGNOSIS — E78.2 MIXED HYPERLIPIDEMIA: ICD-10-CM

## 2022-09-19 DIAGNOSIS — Z11.59 NEED FOR HEPATITIS C SCREENING TEST: ICD-10-CM

## 2022-09-19 DIAGNOSIS — Z11.4 SCREENING FOR HIV (HUMAN IMMUNODEFICIENCY VIRUS): ICD-10-CM

## 2022-09-19 DIAGNOSIS — I69.351 HEMIPLEGIA OF RIGHT DOMINANT SIDE AS LATE EFFECT OF CEREBRAL INFARCTION, UNSPECIFIED HEMIPLEGIA TYPE (HCC): ICD-10-CM

## 2022-09-19 DIAGNOSIS — R56.9 NEW ONSET SEIZURE (HCC): ICD-10-CM

## 2022-09-19 DIAGNOSIS — I48.91 ATRIAL FIBRILLATION WITH RVR (HCC): ICD-10-CM

## 2022-09-19 DIAGNOSIS — Z23 ENCOUNTER FOR IMMUNIZATION: ICD-10-CM

## 2022-09-19 DIAGNOSIS — Z12.31 ENCOUNTER FOR SCREENING MAMMOGRAM FOR BREAST CANCER: ICD-10-CM

## 2022-09-19 DIAGNOSIS — I63.512 ACUTE ISCHEMIC LEFT MCA STROKE (HCC): Primary | ICD-10-CM

## 2022-09-19 PROCEDURE — 99214 OFFICE O/P EST MOD 30 MIN: CPT | Performed by: FAMILY MEDICINE

## 2022-10-10 DIAGNOSIS — R56.9 NEW ONSET SEIZURE (HCC): ICD-10-CM

## 2022-10-10 DIAGNOSIS — I48.0 PAROXYSMAL ATRIAL FIBRILLATION (HCC): ICD-10-CM

## 2022-10-11 PROBLEM — J69.0 ASPIRATION PNEUMONIA (HCC): Status: RESOLVED | Noted: 2022-08-07 | Resolved: 2022-10-11

## 2022-10-11 RX ORDER — LEVETIRACETAM 500 MG/1
500 TABLET ORAL EVERY 12 HOURS SCHEDULED
Qty: 60 TABLET | Refills: 0 | Status: SHIPPED | OUTPATIENT
Start: 2022-10-11

## 2022-10-11 RX ORDER — METOPROLOL SUCCINATE 25 MG/1
25 TABLET, EXTENDED RELEASE ORAL DAILY
Qty: 90 TABLET | Refills: 3 | Status: SHIPPED | OUTPATIENT
Start: 2022-10-11

## 2022-10-19 ENCOUNTER — VBI (OUTPATIENT)
Dept: ADMINISTRATIVE | Facility: OTHER | Age: 62
End: 2022-10-19

## 2022-11-12 DIAGNOSIS — R56.9 NEW ONSET SEIZURE (HCC): ICD-10-CM

## 2022-11-14 RX ORDER — LEVETIRACETAM 500 MG/1
TABLET ORAL
Qty: 60 TABLET | Refills: 0 | Status: SHIPPED | OUTPATIENT
Start: 2022-11-14

## 2022-12-19 DIAGNOSIS — R56.9 NEW ONSET SEIZURE (HCC): ICD-10-CM

## 2022-12-19 RX ORDER — LEVETIRACETAM 500 MG/1
TABLET ORAL
Qty: 60 TABLET | Refills: 0 | Status: SHIPPED | OUTPATIENT
Start: 2022-12-19

## 2023-01-01 DIAGNOSIS — I48.0 PAROXYSMAL ATRIAL FIBRILLATION (HCC): ICD-10-CM

## 2023-01-03 RX ORDER — APIXABAN 5 MG/1
TABLET, FILM COATED ORAL
Qty: 60 TABLET | Refills: 11 | Status: SHIPPED | OUTPATIENT
Start: 2023-01-03

## 2023-01-14 DIAGNOSIS — R56.9 NEW ONSET SEIZURE (HCC): ICD-10-CM

## 2023-01-14 RX ORDER — LEVETIRACETAM 500 MG/1
TABLET ORAL
Qty: 60 TABLET | Refills: 0 | Status: SHIPPED | OUTPATIENT
Start: 2023-01-14

## 2023-01-15 NOTE — PROGRESS NOTES
Name: Eun King      : 1960      MRN: [id number]  Encounter Provider: Shaw Mitchell MD  Encounter Date: 2023   Encounter department: [address] [place] [address] Place     1  Encounter for screening mammogram for breast cancer    2  Acute ischemic left MCA stroke [facility])  Assessment & Plan:  Patient is stable  and will continue present plan of care and reassess at next routine visit  All questions about this problem from patient were answered today  3  Atrial fibrillation with RVR (HCC)  Assessment & Plan:  Continue with anticogulation and rate control of heart rate  Concerns about condition were addressed today  4  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, unspecified hemiplegia type [facility])  Assessment & Plan:  Patient is stable  and will continue present plan of care and reassess at next routine visit  All questions about this problem from patient were answered today  5  Mixed hyperlipidemia  Assessment & Plan:  Patient  is stable with current medication and we discussed a low fat low cholesterol diet  Weight loss also discussed for this will help lower cholesterol also  Recheck lipids in 6 months  6  New onset seizure [facility])  Assessment & Plan:  Patient is stable  and will continue present plan of care and reassess at next routine visit  All questions about this problem from patient were answered today  Subjective     HPI  Review of Systems    Past Medical History:   Diagnosis Date   • Stroke [facility])      Past Surgical History:   Procedure Laterality Date   • OVARY SURGERY       No family history on file    Social History     Socioeconomic History   • Marital status: Single     Spouse name: Not on file   • Number of children: Not on file   • Years of education: Not on file   • Highest education level: Not on file   Occupational History   • Not on file   Tobacco Use   • Smoking status: Former   • Smokeless tobacco: Never   Vaping Use   • Vaping Use: Never used   Substance and Sexual Activity   • Alcohol use: Not Currently   • Drug use: Not Currently   • Sexual activity: Not on file   Other Topics Concern   • Not on file   Social History Narrative   • Not on file     Social Determinants of Health     Financial Resource Strain: Not on file   Food Insecurity: No Food Insecurity   • Worried About Running Out of Food in the Last Year: Never true   • Ran Out of Food in the Last Year: Never true   Transportation Needs: No Transportation Needs   • Lack of Transportation (Medical): No   • Lack of Transportation (Non-Medical): No   Physical Activity: Not on file   Stress: Not on file   Social Connections: Not on file   Intimate Partner Violence: Not on file   Housing Stability: Low Risk    • Unable to Pay for Housing in the Last Year: No   • Number of Places Lived in the Last Year: 1   • Unstable Housing in the Last Year: No     Current Outpatient Medications on File Prior to Visit   Medication Sig   • atorvastatin (LIPITOR) 40 mg tablet TAKE 1 TABLET BY MOUTH EVERY DAY   • Eliquis 5 MG TAKE 1 TABLET BY MOUTH TWICE A DAY   • levETIRAcetam (KEPPRA) 500 mg tablet TAKE 1 TABLET BY MOUTH EVERY 12 HOURS   • metoprolol succinate (TOPROL-XL) 25 mg 24 hr tablet TAKE 1 TABLET (25 MG TOTAL) BY MOUTH DAILY  • sertraline (ZOLOFT) 50 mg tablet TAKE ONE TABLET BY MOUTH DAILY   • thiamine 100 MG tablet Take 100 mg by mouth daily     No Known Allergies    There is no immunization history on file for this patient  Objective     There were no vitals taken for this visit      Physical Exam  Pam Fermin MD

## 2023-01-16 ENCOUNTER — OFFICE VISIT (OUTPATIENT)
Dept: FAMILY MEDICINE CLINIC | Facility: CLINIC | Age: 63
End: 2023-01-16

## 2023-01-16 VITALS
HEART RATE: 47 BPM | SYSTOLIC BLOOD PRESSURE: 118 MMHG | DIASTOLIC BLOOD PRESSURE: 74 MMHG | BODY MASS INDEX: 22.13 KG/M2 | OXYGEN SATURATION: 96 % | TEMPERATURE: 97.5 F | HEIGHT: 68 IN | WEIGHT: 146 LBS | RESPIRATION RATE: 18 BRPM

## 2023-01-16 DIAGNOSIS — I69.351 HEMIPLEGIA OF RIGHT DOMINANT SIDE AS LATE EFFECT OF CEREBRAL INFARCTION, UNSPECIFIED HEMIPLEGIA TYPE (HCC): ICD-10-CM

## 2023-01-16 DIAGNOSIS — Z12.31 ENCOUNTER FOR SCREENING MAMMOGRAM FOR BREAST CANCER: Primary | ICD-10-CM

## 2023-01-16 DIAGNOSIS — R56.9 NEW ONSET SEIZURE (HCC): ICD-10-CM

## 2023-01-16 DIAGNOSIS — I48.91 ATRIAL FIBRILLATION WITH RVR (HCC): ICD-10-CM

## 2023-01-16 DIAGNOSIS — E78.2 MIXED HYPERLIPIDEMIA: ICD-10-CM

## 2023-01-16 DIAGNOSIS — I10 HYPERTENSION, UNSPECIFIED TYPE: ICD-10-CM

## 2023-01-16 DIAGNOSIS — I63.512 ACUTE ISCHEMIC LEFT MCA STROKE (HCC): ICD-10-CM

## 2023-02-08 ENCOUNTER — VBI (OUTPATIENT)
Dept: ADMINISTRATIVE | Facility: OTHER | Age: 63
End: 2023-02-08

## 2023-02-16 DIAGNOSIS — R56.9 NEW ONSET SEIZURE (HCC): ICD-10-CM

## 2023-02-16 RX ORDER — LEVETIRACETAM 500 MG/1
TABLET ORAL
Qty: 60 TABLET | Refills: 0 | Status: SHIPPED | OUTPATIENT
Start: 2023-02-16

## 2023-03-19 DIAGNOSIS — R56.9 NEW ONSET SEIZURE (HCC): ICD-10-CM

## 2023-03-19 RX ORDER — LEVETIRACETAM 500 MG/1
TABLET ORAL
Qty: 60 TABLET | Refills: 0 | Status: SHIPPED | OUTPATIENT
Start: 2023-03-19

## 2023-04-05 ENCOUNTER — HOSPITAL ENCOUNTER (OUTPATIENT)
Dept: RADIOLOGY | Facility: MEDICAL CENTER | Age: 63
Discharge: HOME | End: 2023-04-05

## 2023-04-05 VITALS — WEIGHT: 146 LBS | BODY MASS INDEX: 22.13 KG/M2 | HEIGHT: 68 IN

## 2023-04-05 DIAGNOSIS — Z12.31 ENCOUNTER FOR SCREENING MAMMOGRAM FOR BREAST CANCER: ICD-10-CM

## 2023-04-07 DIAGNOSIS — E78.49 OTHER HYPERLIPIDEMIA: ICD-10-CM

## 2023-04-08 RX ORDER — ATORVASTATIN CALCIUM 40 MG/1
40 TABLET, FILM COATED ORAL DAILY
Qty: 30 TABLET | Refills: 0 | Status: SHIPPED | OUTPATIENT
Start: 2023-04-08

## 2023-04-29 DIAGNOSIS — R56.9 NEW ONSET SEIZURE (HCC): ICD-10-CM

## 2023-04-29 RX ORDER — LEVETIRACETAM 500 MG/1
TABLET ORAL
Qty: 60 TABLET | Refills: 0 | Status: SHIPPED | OUTPATIENT
Start: 2023-04-29

## 2023-05-03 ENCOUNTER — TELEPHONE (OUTPATIENT)
Dept: FAMILY MEDICINE CLINIC | Facility: CLINIC | Age: 63
End: 2023-05-03

## 2023-05-03 DIAGNOSIS — I63.9 IMPENDING CEREBROVASCULAR ACCIDENT (HCC): Primary | ICD-10-CM

## 2023-05-03 DIAGNOSIS — R56.9 GENERALIZED-ONSET SEIZURES (HCC): ICD-10-CM

## 2023-05-03 DIAGNOSIS — I63.9 CEREBROVASCULAR ACCIDENT (CVA), UNSPECIFIED MECHANISM (HCC): ICD-10-CM

## 2023-05-08 DIAGNOSIS — E78.49 OTHER HYPERLIPIDEMIA: ICD-10-CM

## 2023-05-08 RX ORDER — ATORVASTATIN CALCIUM 40 MG/1
TABLET, FILM COATED ORAL
Qty: 30 TABLET | Refills: 0 | Status: SHIPPED | OUTPATIENT
Start: 2023-05-08

## 2023-05-11 ENCOUNTER — HOSPITAL ENCOUNTER (OUTPATIENT)
Dept: MRI IMAGING | Facility: HOSPITAL | Age: 63
Discharge: HOME | End: 2023-05-11

## 2023-05-11 DIAGNOSIS — R90.89 ABNORMAL FINDING ON MRI OF BRAIN: ICD-10-CM

## 2023-05-11 RX ADMIN — GADOBUTROL 6 ML: 604.72 INJECTION INTRAVENOUS at 20:38

## 2023-05-16 ENCOUNTER — OFFICE VISIT (OUTPATIENT)
Dept: NEUROLOGY | Facility: CLINIC | Age: 63
End: 2023-05-16

## 2023-05-16 ENCOUNTER — TELEPHONE (OUTPATIENT)
Dept: NEUROLOGY | Facility: CLINIC | Age: 63
End: 2023-05-16

## 2023-05-16 VITALS
DIASTOLIC BLOOD PRESSURE: 78 MMHG | HEIGHT: 68 IN | WEIGHT: 141 LBS | SYSTOLIC BLOOD PRESSURE: 132 MMHG | BODY MASS INDEX: 21.37 KG/M2

## 2023-05-16 DIAGNOSIS — I63.9 CEREBROVASCULAR ACCIDENT (CVA), UNSPECIFIED MECHANISM (HCC): ICD-10-CM

## 2023-05-16 DIAGNOSIS — G40.109 LOCALIZATION-RELATED EPILEPSY (HCC): ICD-10-CM

## 2023-05-16 DIAGNOSIS — R56.9 NEW ONSET SEIZURE (HCC): ICD-10-CM

## 2023-05-16 DIAGNOSIS — R56.9 GENERALIZED-ONSET SEIZURES (HCC): ICD-10-CM

## 2023-05-16 DIAGNOSIS — I63.512 ACUTE ISCHEMIC LEFT MCA STROKE (HCC): Primary | ICD-10-CM

## 2023-05-16 DIAGNOSIS — H61.23 HEARING LOSS OF BOTH EARS DUE TO CERUMEN IMPACTION: ICD-10-CM

## 2023-05-16 RX ORDER — LEVETIRACETAM 500 MG/1
500 TABLET ORAL EVERY 12 HOURS
Qty: 60 TABLET | Refills: 11 | Status: SHIPPED | OUTPATIENT
Start: 2023-05-16

## 2023-05-16 NOTE — PATIENT INSTRUCTIONS
-- continue to take Levetiracetam (Keppra) 500 mg twice a day  -- continue to take Eliquis and follow with your PCP  -- Please call our office if any problems arise  -- Please get DeBrox solution from the Pharmacy to help clean out your ear wax

## 2023-05-16 NOTE — TELEPHONE ENCOUNTER
Patient son called they will be 5 minutes late  They got caught in traffic   Patient has HFU with Dr Ambreen Polk today at 2pm

## 2023-05-16 NOTE — PROGRESS NOTES
Patient ID: Mike Brandon is a 63 y o  female with left MCA stroke due to Atrial Fibrillation, now on anticoagulation with Eliquis and resulting focal epilepsy, who is presenting to Neurology office for evaluation of her seizures  Assessment/Plan:    Localization-related epilepsy ([facility] [address] )  Overall, she does have a history consistent with focal epilepsy due to her prior left MCA stroke  Fortunately, she has been doing well without any recurrent seizures while taking levetiracetam monotherapy  She is tolerating this well without side effects  Given the nature of her epilepsy due to her stroke, she would benefit from staying on medications going forward  --She will continue levetiracetam 500 mg twice a day  If she would have additional seizures, her dose could be increased  Acute ischemic left MCA stroke ([facility] [address] )  As noted below, the etiology of her stroke was due to her atrial fibrillation  She is now on Eliquis and is also managing her cholesterol with her PCP by taking atorvastatin  We discussed secondary stroke prevention and the importance of continuing to modify her other vascular risk factors to avoid additional strokes  Hearing loss due to cerumen impaction  She does have some difficulty with her hearing due to earwax buildup bilaterally  This is worse in her left ear  There was some concern about a possible acoustic schwannoma on her MRI, but her MRI that was obtained within the last few days did not show any evidence of this  The abnormality seen on imaging was an atypical blood vessel  This MRI finding is not contributing to her symptoms  We discussed the importance of cleaning her ears by using DeBrox solution that can be found at her local drugstore  She should then follow-up with her PCP to assure that her ears are cleared out      I have spent a total time of 60 minutes on 05/21/23 in caring for this patient including Diagnostic results, Risks and benefits of tx options, Instructions "for management, Patient and family education, Risk factor reductions, Impressions, Reviewing / ordering tests, medicine, procedures   and Obtaining or reviewing history    She will Return in about 6 months (around 11/16/2023)  Subjective:    HPI  Current seizure medications:  1  Levetiracetam 500 mg twice a day  2  Eliquis 5 mg twice a day  Other medications as per Epic    Briefly reviewing her history, in October 2021, her son noted that she hadn't come out of her room and her son found her with a facial droop and unable to speak  She was weak on the right side as well  She was found to have a Left MCA stroke  This was ultimately determined to be due to A  Fib and she has been on Eliquis since that time  She was in rehab following her hospitalization, but has since returned home  She continues to have prominent Broca's aphasia and right sided spastic hemiparesis  She had her first seizure in August 2022  Her son saw her laying down and \"looking strange\"  He said her arms were convulsing  He was not able to give a full description of what happened, but noted she was shaking all over  She was taken to the hospital and was found there to be in A  Fib with RVR, which resolved with metoprolol  Her event was felt to be due to a seizure  She was started on Levetiracetam and has been doing well since that time  Her son felt that her speech has been getting a little better time  She still has right sided hemiparesis, worse in her arm  She is ambulatory, but may drag her right leg when more tired  When in the hospital, she had an MRI, which did not show evidence of a new stroke, but did show an abnormality in her left IAC, which was not well visualized  She had gotten a repeat MRI IAC on 5/11/2023  The report of this was pending at the time of my visit, but subsequently showed no tumor  There was a vascular loop in the left IAC       Special Features  Status epilepticus: no  Self Injury Seizures: " "no  Precipitating Factors: no    Prior AEDs:  None     Prior Evaluation:  - MRI brain: 2022 (independently reviewed): Sequela of previous left MCA territory infarction with encephalomalacia and gliosis  Additional scattered chronic microangiopathic change  No acute ischemia  Abnormality within the left internal auditory canal may represent thickened nerve and dedicated MRI of the IACs with contrast is recommended to exclude acoustic neuroma  See series 6 image 6   - MRI brain 2023: (independently reviewed)   No CP angle mass lesions identified  Tortuous left-sided anterior inferior cerebellar artery vascular loop contacts the left 7th and 8th nerve complex and accounts for the abnormality described on prior MRI  - Routine EE2022  Left frontal and left hemisphere slowing    - Ambulatory EEG: none  - Video EEG: none  - PET scan brain :  none  - Neuropsychologic testing: none    Her history was also obtained from her son, who was present at today's visit  I personally reviewed her MRI brains, as detailed above  I reviewed prior notes including prior hospitalization notes and testing, as documented in [employer], and summarized above  The following portions of the patient's history were reviewed and updated as appropriate: allergies, current medications, past family history, past medical history, past social history, past surgical history and problem list      Objective:    Blood pressure 132/78, height 5' 8\" (1 727 m), weight 64 kg (141 lb)  Physical Exam    Neurological Exam  GENERAL EXAMINATION:   In general patient is well appearing and in no distress  There is no peripheral edema  She does have significant cerumen impaction in both ears, with tympanic membrane obscured bilaterally  NEUROLOGIC EXAMINATION:     Alert and oriented to person, date, location  She was not able to answer questions directly due to aphasia, but was able to indicate correct answers given options   " Mood and affect are appropriate  Attention is intact  Language: impaired fluency and repetition  Comprehension was intact  Cranial nerves: Pupils are equal round reactive to light and accommodation  Visual Fields are full to confrontation bilaterally  Extraocular movements are intact without nystagmus  Facial sensation is intact to light touch  No facial droop, but decreased activation of right side of face compared to left  There is mild dysarthria  Hearing was intact to finger rub  Tongue and uvula are midline and palate elevates symmetrically  Shoulder shrug was delayed on the right  Motor Exam:  She has decreased bulk in her right arm with mild spasticity  She has full passive ROM  Left sided bulk and tone were normal  Strength is 5/5 throughout the left side, but 4/5 throughout the right in arm and leg  Deep tendon reflexes: Biceps 2+, brachioradialis 2+, patellar 2+, Achilles 2+ on the left, but 3+ on the right  Sensation: Intact light touch    Coordination: Finger nose finger and heel to shin testing are without dysmetria on the left, but unable to perform on the right due to hemiparesis  Gait: Right spastic and hemiparetic gait  ROS:    Review of Systems   Constitutional: Negative  Negative for appetite change and fever  HENT: Negative  Negative for hearing loss, tinnitus, trouble swallowing and voice change  Eyes: Negative  Negative for photophobia, pain and visual disturbance  Respiratory: Negative  Negative for shortness of breath  Cardiovascular: Negative  Negative for palpitations  Gastrointestinal: Negative  Negative for nausea and vomiting  Endocrine: Negative  Negative for cold intolerance  Genitourinary: Negative  Negative for dysuria, frequency and urgency  Musculoskeletal: Negative  Negative for gait problem, myalgias and neck pain  Skin: Negative  Negative for rash  Allergic/Immunologic: Negative  Neurological: Negative  Negative for dizziness, tremors, seizures, syncope, facial asymmetry, speech difficulty, weakness, light-headedness, numbness and headaches  Hematological: Negative  Does not bruise/bleed easily  Psychiatric/Behavioral: Negative  Negative for confusion, hallucinations and sleep disturbance  All other systems reviewed and are negative  I personally reviewed the ROS that was entered by the medical assistant    Voice recognition software was used in the generation of this note  There may be unintentional errors including grammatical errors, spelling errors, or pronoun errors

## 2023-05-17 ENCOUNTER — TELEPHONE (OUTPATIENT)
Dept: NEUROLOGY | Facility: CLINIC | Age: 63
End: 2023-05-17

## 2023-05-17 NOTE — TELEPHONE ENCOUNTER
"----- Message from Miguel Carreno MD sent at 5/17/2023  9:17 AM EDT -----  Please call patient's son about the results of her MRI brain IAC from 5/11/2023. The final report from radiology is in and there is no evidence of a tumor. The \"abnormality\" they saw before is just a loop in one of the blood vessels. This isn't anything to worry about and isn't anything that needs any additional testing.     "

## 2023-05-21 PROBLEM — H61.20 HEARING LOSS DUE TO CERUMEN IMPACTION: Status: ACTIVE | Noted: 2023-05-21

## 2023-05-22 NOTE — ASSESSMENT & PLAN NOTE
As noted below, the etiology of her stroke was due to her atrial fibrillation  She is now on Eliquis and is also managing her cholesterol with her PCP by taking atorvastatin  We discussed secondary stroke prevention and the importance of continuing to modify her other vascular risk factors to avoid additional strokes

## 2023-05-22 NOTE — ASSESSMENT & PLAN NOTE
Overall, she does have a history consistent with focal epilepsy due to her prior left MCA stroke  Fortunately, she has been doing well without any recurrent seizures while taking levetiracetam monotherapy  She is tolerating this well without side effects  Given the nature of her epilepsy due to her stroke, she would benefit from staying on medications going forward  --She will continue levetiracetam 500 mg twice a day  If she would have additional seizures, her dose could be increased

## 2023-05-22 NOTE — ASSESSMENT & PLAN NOTE
She does have some difficulty with her hearing due to earwax buildup bilaterally  This is worse in her left ear  There was some concern about a possible acoustic schwannoma on her MRI, but her MRI that was obtained within the last few days did not show any evidence of this  The abnormality seen on imaging was an atypical blood vessel  This MRI finding is not contributing to her symptoms  We discussed the importance of cleaning her ears by using DeBrox solution that can be found at her local drugstore  She should then follow-up with her PCP to assure that her ears are cleared out

## 2023-06-09 DIAGNOSIS — E78.49 OTHER HYPERLIPIDEMIA: ICD-10-CM

## 2023-06-11 RX ORDER — ATORVASTATIN CALCIUM 40 MG/1
TABLET, FILM COATED ORAL
Qty: 30 TABLET | Refills: 0 | Status: SHIPPED | OUTPATIENT
Start: 2023-06-11

## 2023-07-12 DIAGNOSIS — E78.49 OTHER HYPERLIPIDEMIA: ICD-10-CM

## 2023-07-12 RX ORDER — ATORVASTATIN CALCIUM 40 MG/1
TABLET, FILM COATED ORAL
Qty: 30 TABLET | Refills: 0 | Status: SHIPPED | OUTPATIENT
Start: 2023-07-12

## 2023-07-20 PROBLEM — H61.20 HEARING LOSS DUE TO CERUMEN IMPACTION: Status: RESOLVED | Noted: 2023-05-21 | Resolved: 2023-07-20

## 2023-07-21 DIAGNOSIS — R56.9 NEW ONSET SEIZURE (HCC): ICD-10-CM

## 2023-07-23 NOTE — ASSESSMENT & PLAN NOTE
Continue with anticogulation and rate control of heart rate. Concerns about condition were addressed today.

## 2023-07-23 NOTE — PROGRESS NOTES
Name: David Rodriguez      : 1960      MRN: [id number]  Encounter Provider: Sharlene Moritz, MD  Encounter Date: 2023   Encounter department: [address] [place] [address] Avenue     1. Acute ischemic left MCA stroke [facility])  Assessment & Plan:  Patient is stable  and will continue present plan of care and reassess at next routine visit. All questions about this problem from patient were answered today. 2. 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, unspecified hemiplegia type [facility])  Assessment & Plan:  Patient is stable  and will continue present plan of care and reassess at next routine visit. All questions about this problem from patient were answered today. 3. Mixed hyperlipidemia  Assessment & Plan:  Patient  is stable with current medication and we discussed a low fat low cholesterol diet. Weight loss also discussed for this will help lower cholesterol also. Recheck lipids in 6 months. Orders:  -     Lipid Panel with Direct LDL reflex; Future    4. Paroxysmal atrial fibrillation (HCC)  Assessment & Plan:  Continue with anticogulation and rate control of heart rate. Concerns about condition were addressed today. 5. Localization-related epilepsy [facility])  Assessment & Plan:  Patient is stable  and will continue present plan of care and reassess at next routine visit. All questions about this problem from patient were answered today. Orders:  -     HIV 1/2 AG/AB w Reflex [facility] for 2 yr old and above; Future    6. Screening for HIV (human immunodeficiency virus)  -     HIV 1/2 AG/AB w Reflex [facility] for 2 yr old and above; Future    7. Need for hepatitis C screening test  -     Hepatitis C antibody; Future    8. Other fatigue  -     TSH + Free T4; Future  -     Comprehensive metabolic panel; Future  -     CBC and differential; Future  -     Magnesium; Future  -     Uric acid; Future  -     Urinalysis with microscopic    9.  SOB (shortness of breath)  -     albuterol (Proventil HFA) 90 mcg/act inhaler; Inhale 2 puffs every 6 (six) hours as needed for wheezing        Depression Screening and Follow-up Plan: Patient was screened for depression during today's encounter. They screened negative with a PHQ-2 score of 2. Subjective     61-year-old female today for checkup for yearly physical exam as well as checkup on multiple medical problems. Patient history of A-fib with CVA with a right-sided weakness. Patient also has some dysphagia as well as problems with speaking. Patient is on suppressive Keppra and would like to see about getting rid of that medicine because she is having sedation with that. I spoke with her about talking to her neurologist other options besides Keppra for this but did not stop her Keppra at this point. Patient also has been having some issues with some shortness of breath when she exerts herself or when she is just sitting. The patient states that she feels pretty good however family states that she has some wheezing when she exerts herself and we will see about getting her some albuterol. We will see the patient back at her next visit we will see about possibly doing some pulmonary function testing if this does not resolve with the albuterol. Patient is no longer smoking but is history of smoking and cannot rule out some COPD or some COPD superimposed with some asthma.     Review of Systems    Past Medical History:   Diagnosis Date   • Cervical cancer ([address])    • Stroke [facility])      Past Surgical History:   Procedure Laterality Date   • HYSTERECTOMY     • OVARY SURGERY       Family History   Problem Relation Age of Onset   • No Known Problems Mother    • No Known Problems Father    • No Known Problems Sister    • No Known Problems Sister    • No Known Problems Maternal Grandmother    • No Known Problems Maternal Grandfather    • No Known Problems Paternal Grandmother    • No Known Problems Paternal Grandfather    • No Known Problems Son    • No Known Problems Son    • No Known Problems Maternal Aunt    • No Known Problems Maternal Aunt    • No Known Problems Paternal Uncle    • No Known Problems Paternal Uncle      Social History     Socioeconomic History   • Marital status: Single     Spouse name: None   • Number of children: None   • Years of education: None   • Highest education level: None   Occupational History   • None   Tobacco Use   • Smoking status: Former   • Smokeless tobacco: Never   Vaping Use   • Vaping Use: Never used   Substance and Sexual Activity   • Alcohol use: Not Currently   • Drug use: Not Currently   • Sexual activity: None   Other Topics Concern   • None   Social History Narrative   • None     Social Determinants of Health     Financial Resource Strain: Not on file   Food Insecurity: No Food Insecurity (8/10/2022)    Hunger Vital Sign    • Worried About Running Out of Food in the Last Year: Never true    • Ran Out of Food in the Last Year: Never true   Transportation Needs: No Transportation Needs (8/10/2022)    PRAPARE - Transportation    • Lack of Transportation (Medical): No    • Lack of Transportation (Non-Medical): No   Physical Activity: Not on file   Stress: Not on file   Social Connections: Not on file   Intimate Partner Violence: Not on file   Housing Stability: Low Risk  (8/10/2022)    Housing Stability Vital Sign    • Unable to Pay for Housing in the Last Year: No    • Number of Places Lived in the Last Year: 1    • Unstable Housing in the Last Year: No     Current Outpatient Medications on File Prior to Visit   Medication Sig   • atorvastatin (LIPITOR) 40 mg tablet TAKE 1 TABLET BY MOUTH EVERY DAY   • Eliquis 5 MG TAKE 1 TABLET BY MOUTH TWICE A DAY   • levETIRAcetam (KEPPRA) 500 mg tablet Take 1 tablet (500 mg total) by mouth every 12 (twelve) hours   • metoprolol succinate (TOPROL-XL) 25 mg 24 hr tablet TAKE 1 TABLET (25 MG TOTAL) BY MOUTH DAILY.    • [DISCONTINUED] sertraline (ZOLOFT) 50 mg tablet TAKE 1 TABLET BY MOUTH DAILY   • thiamine 100 MG tablet Take 100 mg by mouth daily     No Known Allergies    There is no immunization history on file for this patient.     Objective     /70 (BP Location: Left arm, Patient Position: Sitting, Cuff Size: Standard)   Pulse 57   Temp 98.1 °F (36.7 °C)   Resp 18   Ht 5' 8" (1.727 m)   Wt 66.5 kg (146 lb 8 oz)   SpO2 96%   BMI 22.28 kg/m²     Physical Exam  Sury Cortez MD

## 2023-07-24 ENCOUNTER — OFFICE VISIT (OUTPATIENT)
Dept: FAMILY MEDICINE CLINIC | Facility: CLINIC | Age: 63
End: 2023-07-24
Payer: COMMERCIAL

## 2023-07-24 VITALS
TEMPERATURE: 98.1 F | RESPIRATION RATE: 18 BRPM | SYSTOLIC BLOOD PRESSURE: 124 MMHG | OXYGEN SATURATION: 96 % | BODY MASS INDEX: 22.2 KG/M2 | WEIGHT: 146.5 LBS | DIASTOLIC BLOOD PRESSURE: 70 MMHG | HEIGHT: 68 IN | HEART RATE: 57 BPM

## 2023-07-24 DIAGNOSIS — I69.351 HEMIPLEGIA OF RIGHT DOMINANT SIDE AS LATE EFFECT OF CEREBRAL INFARCTION, UNSPECIFIED HEMIPLEGIA TYPE (HCC): ICD-10-CM

## 2023-07-24 DIAGNOSIS — Z11.4 SCREENING FOR HIV (HUMAN IMMUNODEFICIENCY VIRUS): ICD-10-CM

## 2023-07-24 DIAGNOSIS — I48.0 PAROXYSMAL ATRIAL FIBRILLATION (HCC): ICD-10-CM

## 2023-07-24 DIAGNOSIS — R06.02 SOB (SHORTNESS OF BREATH): ICD-10-CM

## 2023-07-24 DIAGNOSIS — R53.83 OTHER FATIGUE: ICD-10-CM

## 2023-07-24 DIAGNOSIS — I63.512 ACUTE ISCHEMIC LEFT MCA STROKE (HCC): Primary | ICD-10-CM

## 2023-07-24 DIAGNOSIS — G40.109 LOCALIZATION-RELATED EPILEPSY (HCC): ICD-10-CM

## 2023-07-24 DIAGNOSIS — E78.2 MIXED HYPERLIPIDEMIA: ICD-10-CM

## 2023-07-24 DIAGNOSIS — Z11.59 NEED FOR HEPATITIS C SCREENING TEST: ICD-10-CM

## 2023-07-24 PROCEDURE — 99396 PREV VISIT EST AGE 40-64: CPT | Performed by: FAMILY MEDICINE

## 2023-07-24 RX ORDER — ALBUTEROL SULFATE 90 UG/1
2 AEROSOL, METERED RESPIRATORY (INHALATION) EVERY 6 HOURS PRN
Qty: 6.7 G | Refills: 5 | Status: SHIPPED | OUTPATIENT
Start: 2023-07-24

## 2023-08-15 DIAGNOSIS — E78.49 OTHER HYPERLIPIDEMIA: ICD-10-CM

## 2023-08-15 RX ORDER — ATORVASTATIN CALCIUM 40 MG/1
TABLET, FILM COATED ORAL
Qty: 30 TABLET | Refills: 0 | Status: SHIPPED | OUTPATIENT
Start: 2023-08-15

## 2023-09-22 ENCOUNTER — APPOINTMENT (OUTPATIENT)
Dept: LAB | Facility: MEDICAL CENTER | Age: 63
End: 2023-09-22
Payer: COMMERCIAL

## 2023-09-22 DIAGNOSIS — G40.109 LOCALIZATION-RELATED EPILEPSY (HCC): ICD-10-CM

## 2023-09-22 DIAGNOSIS — Z11.59 NEED FOR HEPATITIS C SCREENING TEST: ICD-10-CM

## 2023-09-22 DIAGNOSIS — E78.2 MIXED HYPERLIPIDEMIA: ICD-10-CM

## 2023-09-22 DIAGNOSIS — Z11.4 SCREENING FOR HIV (HUMAN IMMUNODEFICIENCY VIRUS): ICD-10-CM

## 2023-09-22 DIAGNOSIS — R53.83 OTHER FATIGUE: ICD-10-CM

## 2023-09-22 DIAGNOSIS — I10 HYPERTENSION, UNSPECIFIED TYPE: ICD-10-CM

## 2023-09-22 LAB
ALBUMIN SERPL BCP-MCNC: 4.4 G/DL (ref 3.5–5)
ALP SERPL-CCNC: 83 U/L (ref 34–104)
ALT SERPL W P-5'-P-CCNC: 15 U/L (ref 7–52)
ANION GAP SERPL CALCULATED.3IONS-SCNC: 7 MMOL/L
AST SERPL W P-5'-P-CCNC: 23 U/L (ref 13–39)
BASOPHILS # BLD AUTO: 0.04 THOUSANDS/ÂΜL (ref 0–0.1)
BASOPHILS NFR BLD AUTO: 1 % (ref 0–1)
BILIRUB SERPL-MCNC: 0.81 MG/DL (ref 0.2–1)
BUN SERPL-MCNC: 12 MG/DL (ref 5–25)
CALCIUM SERPL-MCNC: 9.7 MG/DL (ref 8.4–10.2)
CHLORIDE SERPL-SCNC: 100 MMOL/L (ref 96–108)
CHOLEST SERPL-MCNC: 142 MG/DL
CO2 SERPL-SCNC: 30 MMOL/L (ref 21–32)
CREAT SERPL-MCNC: 0.69 MG/DL (ref 0.6–1.3)
EOSINOPHIL # BLD AUTO: 0.16 THOUSAND/ÂΜL (ref 0–0.61)
EOSINOPHIL NFR BLD AUTO: 3 % (ref 0–6)
ERYTHROCYTE [DISTWIDTH] IN BLOOD BY AUTOMATED COUNT: 12.9 % (ref 11.6–15.1)
GFR SERPL CREATININE-BSD FRML MDRD: 94 ML/MIN/1.73SQ M
GLUCOSE P FAST SERPL-MCNC: 100 MG/DL (ref 65–99)
HCT VFR BLD AUTO: 45 % (ref 34.8–46.1)
HCV AB SER QL: NORMAL
HDLC SERPL-MCNC: 38 MG/DL
HGB BLD-MCNC: 14.4 G/DL (ref 11.5–15.4)
HIV 1+2 AB+HIV1 P24 AG SERPL QL IA: NORMAL
HIV 2 AB SERPL QL IA: NORMAL
HIV1 AB SERPL QL IA: NORMAL
HIV1 P24 AG SERPL QL IA: NORMAL
IMM GRANULOCYTES # BLD AUTO: 0.01 THOUSAND/UL (ref 0–0.2)
IMM GRANULOCYTES NFR BLD AUTO: 0 % (ref 0–2)
LDLC SERPL CALC-MCNC: 78 MG/DL (ref 0–100)
LYMPHOCYTES # BLD AUTO: 1.31 THOUSANDS/ÂΜL (ref 0.6–4.47)
LYMPHOCYTES NFR BLD AUTO: 28 % (ref 14–44)
MAGNESIUM SERPL-MCNC: 2.1 MG/DL (ref 1.9–2.7)
MCH RBC QN AUTO: 30.1 PG (ref 26.8–34.3)
MCHC RBC AUTO-ENTMCNC: 32 G/DL (ref 31.4–37.4)
MCV RBC AUTO: 94 FL (ref 82–98)
MONOCYTES # BLD AUTO: 0.22 THOUSAND/ÂΜL (ref 0.17–1.22)
MONOCYTES NFR BLD AUTO: 5 % (ref 4–12)
NEUTROPHILS # BLD AUTO: 3.02 THOUSANDS/ÂΜL (ref 1.85–7.62)
NEUTS SEG NFR BLD AUTO: 63 % (ref 43–75)
NRBC BLD AUTO-RTO: 0 /100 WBCS
PLATELET # BLD AUTO: 149 THOUSANDS/UL (ref 149–390)
PMV BLD AUTO: 12.4 FL (ref 8.9–12.7)
POTASSIUM SERPL-SCNC: 3.8 MMOL/L (ref 3.5–5.3)
PROT SERPL-MCNC: 7.9 G/DL (ref 6.4–8.4)
RBC # BLD AUTO: 4.78 MILLION/UL (ref 3.81–5.12)
SODIUM SERPL-SCNC: 137 MMOL/L (ref 135–147)
T4 FREE SERPL-MCNC: 1.02 NG/DL (ref 0.61–1.12)
TRIGL SERPL-MCNC: 129 MG/DL
TSH SERPL DL<=0.05 MIU/L-ACNC: 1.53 UIU/ML (ref 0.45–4.5)
URATE SERPL-MCNC: 4.9 MG/DL (ref 2–7.5)
WBC # BLD AUTO: 4.76 THOUSAND/UL (ref 4.31–10.16)

## 2023-09-22 PROCEDURE — 84550 ASSAY OF BLOOD/URIC ACID: CPT

## 2023-09-22 PROCEDURE — 85025 COMPLETE CBC W/AUTO DIFF WBC: CPT

## 2023-09-22 PROCEDURE — 84443 ASSAY THYROID STIM HORMONE: CPT

## 2023-09-22 PROCEDURE — 36415 COLL VENOUS BLD VENIPUNCTURE: CPT

## 2023-09-22 PROCEDURE — 87389 HIV-1 AG W/HIV-1&-2 AB AG IA: CPT

## 2023-09-22 PROCEDURE — 80053 COMPREHEN METABOLIC PANEL: CPT

## 2023-09-22 PROCEDURE — 80061 LIPID PANEL: CPT

## 2023-09-22 PROCEDURE — 86803 HEPATITIS C AB TEST: CPT

## 2023-09-22 PROCEDURE — 84439 ASSAY OF FREE THYROXINE: CPT

## 2023-09-22 PROCEDURE — 83735 ASSAY OF MAGNESIUM: CPT

## 2023-09-25 ENCOUNTER — APPOINTMENT (OUTPATIENT)
Dept: LAB | Facility: MEDICAL CENTER | Age: 63
End: 2023-09-25
Payer: COMMERCIAL

## 2023-09-25 LAB
BACTERIA UR QL AUTO: ABNORMAL /HPF
BILIRUB UR QL STRIP: NEGATIVE
CLARITY UR: ABNORMAL
COLOR UR: YELLOW
GLUCOSE UR STRIP-MCNC: NEGATIVE MG/DL
HGB UR QL STRIP.AUTO: ABNORMAL
KETONES UR STRIP-MCNC: NEGATIVE MG/DL
LEUKOCYTE ESTERASE UR QL STRIP: ABNORMAL
MUCOUS THREADS UR QL AUTO: ABNORMAL
NITRITE UR QL STRIP: NEGATIVE
NON-SQ EPI CELLS URNS QL MICRO: ABNORMAL /HPF
PH UR STRIP.AUTO: 6 [PH]
PROT UR STRIP-MCNC: ABNORMAL MG/DL
RBC #/AREA URNS AUTO: ABNORMAL /HPF
SP GR UR STRIP.AUTO: 1.02 (ref 1–1.03)
UROBILINOGEN UR STRIP-ACNC: 3 MG/DL
WBC #/AREA URNS AUTO: ABNORMAL /HPF

## 2023-09-26 NOTE — PROGRESS NOTES
Name: Juleen Goodpasture      : 1960      MRN: [id number]  Encounter Provider: Zeny Lipscomb MD  Encounter Date: 2023   Encounter department: [facility] [address]     1. Localization-related epilepsy [facility])  Assessment & Plan:  Patient is stable  and will continue present plan of care and reassess at next routine visit. All questions about this problem from patient were answered today. 2. Atrial fibrillation with RVR (HCC)  Assessment & Plan:  Continue with anticogulation and rate control of heart rate. Concerns about condition were addressed today. 3. Mixed hyperlipidemia  Assessment & Plan:  Patient  is stable with current medication and we discussed a low fat low cholesterol diet. Weight loss also discussed for this will help lower cholesterol also. Recheck lipids in 6 months. Orders:  -     Comprehensive metabolic panel; Future  -     Lipid Panel with Direct LDL reflex; Future    4. 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, unspecified hemiplegia type [facility])  Assessment & Plan:  Patient is stable  and will continue present plan of care and reassess at next routine visit. All questions about this problem from patient were answered today. 5. Acute ischemic left MCA stroke [facility])  Assessment & Plan:  Patient is stable  and will continue present plan of care and reassess at next routine visit. All questions about this problem from patient were answered today. Subjective     Is a 53-year-old female today for checkup on multimedical problems. Patient with a history with some seizure A-fib with history of CVA with right-sided weakness. Patient does have some aphasia. She also does have a history of hypertension and history of smoking but no longer smoking. Patient would like to hold off any flu shot at this point and will patient with one in the future.   She had lab work done which looks very good kidney and liver function is good her cholesterol is well controlled and her blood pressure is also well controlled. Patient was having some issues using her inhaler with coordination I have given her a spacer for that which should take care of that problem we will see the patient back in approximately 6 months.     Review of Systems    Past Medical History:   Diagnosis Date   • Cervical cancer ([address])    • Stroke [facility])      Past Surgical History:   Procedure Laterality Date   • HYSTERECTOMY     • OVARY SURGERY       Family History   Problem Relation Age of Onset   • No Known Problems Mother    • No Known Problems Father    • No Known Problems Sister    • No Known Problems Sister    • No Known Problems Maternal Grandmother    • No Known Problems Maternal Grandfather    • No Known Problems Paternal Grandmother    • No Known Problems Paternal Grandfather    • No Known Problems Son    • No Known Problems Son    • No Known Problems Maternal Aunt    • No Known Problems Maternal Aunt    • No Known Problems Paternal Uncle    • No Known Problems Paternal Uncle      Social History     Socioeconomic History   • Marital status: Single     Spouse name: None   • Number of children: None   • Years of education: None   • Highest education level: None   Occupational History   • None   Tobacco Use   • Smoking status: Former   • Smokeless tobacco: Never   Vaping Use   • Vaping Use: Never used   Substance and Sexual Activity   • Alcohol use: Not Currently   • Drug use: Not Currently   • Sexual activity: None   Other Topics Concern   • None   Social History Narrative   • None     Social Determinants of Health     Financial Resource Strain: Not on file   Food Insecurity: No Food Insecurity (8/10/2022)    Hunger Vital Sign    • Worried About Running Out of Food in the Last Year: Never true    • Ran Out of Food in the Last Year: Never true   Transportation Needs: No Transportation Needs (8/10/2022)    PRAPARE - Transportation    • Lack of Transportation (Medical): No    • Lack of Transportation (Non-Medical): No   Physical Activity: Not on file   Stress: Not on file   Social Connections: Not on file   Intimate Partner Violence: Not on file   Housing Stability: Low Risk  (8/10/2022)    Housing Stability Vital Sign    • Unable to Pay for Housing in the Last Year: No    • Number of Places Lived in the Last Year: 1    • Unstable Housing in the Last Year: No     Current Outpatient Medications on File Prior to Visit   Medication Sig   • albuterol (Proventil HFA) 90 mcg/act inhaler Inhale 2 puffs every 6 (six) hours as needed for wheezing   • atorvastatin (LIPITOR) 40 mg tablet TAKE 1 TABLET BY MOUTH EVERY DAY   • Eliquis 5 MG TAKE 1 TABLET BY MOUTH TWICE A DAY   • levETIRAcetam (KEPPRA) 500 mg tablet Take 1 tablet (500 mg total) by mouth every 12 (twelve) hours   • metoprolol succinate (TOPROL-XL) 25 mg 24 hr tablet TAKE 1 TABLET (25 MG TOTAL) BY MOUTH DAILY. • thiamine 100 MG tablet Take 100 mg by mouth daily     No Known Allergies    There is no immunization history on file for this patient.     Objective     /74 (BP Location: Left arm, Patient Position: Sitting, Cuff Size: Standard)   Pulse 64   Temp 97.5 °F (36.4 °C) (Temporal)   Resp 16   Ht 5' 8" (1.727 m)   Wt 66.2 kg (146 lb)   SpO2 95%   BMI 22.20 kg/m²     Physical Exam  Maggie Tinsley MD

## 2023-09-27 ENCOUNTER — OFFICE VISIT (OUTPATIENT)
Dept: FAMILY MEDICINE CLINIC | Facility: CLINIC | Age: 63
End: 2023-09-27
Payer: COMMERCIAL

## 2023-09-27 VITALS
BODY MASS INDEX: 22.13 KG/M2 | WEIGHT: 146 LBS | SYSTOLIC BLOOD PRESSURE: 102 MMHG | OXYGEN SATURATION: 95 % | HEIGHT: 68 IN | RESPIRATION RATE: 16 BRPM | DIASTOLIC BLOOD PRESSURE: 74 MMHG | HEART RATE: 64 BPM | TEMPERATURE: 97.5 F

## 2023-09-27 DIAGNOSIS — G40.109 LOCALIZATION-RELATED EPILEPSY (HCC): Primary | ICD-10-CM

## 2023-09-27 DIAGNOSIS — I69.351 HEMIPLEGIA OF RIGHT DOMINANT SIDE AS LATE EFFECT OF CEREBRAL INFARCTION, UNSPECIFIED HEMIPLEGIA TYPE (HCC): ICD-10-CM

## 2023-09-27 DIAGNOSIS — E78.2 MIXED HYPERLIPIDEMIA: ICD-10-CM

## 2023-09-27 DIAGNOSIS — I63.512 ACUTE ISCHEMIC LEFT MCA STROKE (HCC): ICD-10-CM

## 2023-09-27 DIAGNOSIS — I48.91 ATRIAL FIBRILLATION WITH RVR (HCC): ICD-10-CM

## 2023-09-27 PROCEDURE — 99214 OFFICE O/P EST MOD 30 MIN: CPT | Performed by: FAMILY MEDICINE

## 2023-10-17 DIAGNOSIS — I48.0 PAROXYSMAL ATRIAL FIBRILLATION (HCC): ICD-10-CM

## 2023-10-17 RX ORDER — METOPROLOL SUCCINATE 25 MG/1
25 TABLET, EXTENDED RELEASE ORAL DAILY
Qty: 90 TABLET | Refills: 1 | Status: SHIPPED | OUTPATIENT
Start: 2023-10-17

## 2023-10-19 DIAGNOSIS — E78.49 OTHER HYPERLIPIDEMIA: ICD-10-CM

## 2023-10-19 RX ORDER — ATORVASTATIN CALCIUM 40 MG/1
TABLET, FILM COATED ORAL
Qty: 30 TABLET | Refills: 0 | Status: SHIPPED | OUTPATIENT
Start: 2023-10-19

## 2023-10-24 ENCOUNTER — TELEPHONE (OUTPATIENT)
Dept: NEUROLOGY | Facility: CLINIC | Age: 63
End: 2023-10-24

## 2023-10-25 ENCOUNTER — OFFICE VISIT (OUTPATIENT)
Dept: NEUROLOGY | Facility: CLINIC | Age: 63
End: 2023-10-25
Payer: COMMERCIAL

## 2023-10-25 VITALS
HEIGHT: 68 IN | SYSTOLIC BLOOD PRESSURE: 142 MMHG | DIASTOLIC BLOOD PRESSURE: 82 MMHG | BODY MASS INDEX: 20.46 KG/M2 | WEIGHT: 135 LBS | HEART RATE: 57 BPM

## 2023-10-25 DIAGNOSIS — H61.23 BILATERAL HEARING LOSS DUE TO CERUMEN IMPACTION: ICD-10-CM

## 2023-10-25 DIAGNOSIS — G40.109 LOCALIZATION-RELATED EPILEPSY (HCC): Primary | ICD-10-CM

## 2023-10-25 DIAGNOSIS — R56.9 NEW ONSET SEIZURE (HCC): ICD-10-CM

## 2023-10-25 PROCEDURE — 99214 OFFICE O/P EST MOD 30 MIN: CPT | Performed by: PSYCHIATRY & NEUROLOGY

## 2023-10-25 RX ORDER — LEVETIRACETAM 500 MG/1
500 TABLET ORAL EVERY 12 HOURS
Qty: 60 TABLET | Refills: 11 | Status: SHIPPED | OUTPATIENT
Start: 2023-10-25

## 2023-10-25 NOTE — PATIENT INSTRUCTIONS
-- continue to take Levetiracetam unchanged. -- you can talk to your PCP about cleaning out your ear wax. This can help improve your hearing.

## 2023-10-25 NOTE — PROGRESS NOTES
Patient ID: Mela Joya is a 66 y.o. female with  left MCA stroke due to Atrial Fibrillation, now on anticoagulation with Eliquis and resulting focal epilepsy, who is returning to Neurology office for follow up of her seizures. Assessment/Plan:    Localization-related epilepsy ([address])  Overall, she continues to be seizure-free on levetiracetam monotherapy. She continues to tolerate this well without any significant side effects. They did inquire about the possibility of decreasing or coming off of her medication, but given her prior stroke and residual aphasia and right-sided hemiparesis, she would be at high risk of additional seizures if not taking seizure medications. I would not recommend going on a lower dose since she is currently already on the minimally effective dose of levetiracetam.    --She will continue levetiracetam 500 mg twice a day. If she would have any additional seizures, her dose could be increased. Bilateral hearing loss due to cerumen impaction  She continues to have difficulty with her hearing, and upon examination, continues to have bilateral cerumen impaction, obscuring her tympanic membranes. We discussed techniques to manage this at home versus discussing with her PCP for further management        She will Return in about 6 months (around 4/25/2024). Subjective:    HPI  Current seizure medications:  1. Levetiracetam 500 mg twice a day  Other medications as per Epic. Since her last visit, she has been doing well. She has not had any additional seizures and has been tolerating Levetiracetam well without side effects. She continues to note some issues with decreased hearing. They purchased debrox, but did not use it yet. Her tympanic membranes are again not visible due to severe ear wax buildup. Prior Seizure Medications: none    Her history was also obtained from her son, who was present at today's visit.         Objective:    Blood pressure 142/82, pulse 57, height 5' 8" (1.727 m), weight 61.2 kg (135 lb). Physical Exam    Neurological Exam      ROS:    Review of Systems    I personally reviewed the ROS that was entered by the medical assistant in a separate note. Voice recognition software was used in the generation of this note. There may be unintentional errors including grammatical errors, spelling errors, or pronoun errors.

## 2023-10-25 NOTE — PROGRESS NOTES
ROS:    Review of Systems   Constitutional:  Negative for appetite change, fatigue and fever. HENT:  Positive for tinnitus (left ear). Negative for hearing loss, trouble swallowing and voice change. Eyes: Negative. Negative for photophobia, pain and visual disturbance. Respiratory: Negative. Negative for shortness of breath. Cardiovascular: Negative. Negative for palpitations. Gastrointestinal: Negative. Negative for nausea and vomiting. Endocrine: Negative. Negative for cold intolerance. Genitourinary: Negative. Negative for dysuria, frequency and urgency. Musculoskeletal:  Positive for gait problem (pt uses walker). Negative for back pain, myalgias and neck pain. Skin: Negative. Negative for rash. Allergic/Immunologic: Negative. Neurological:  Positive for seizures (december 2022), speech difficulty and weakness (right arm). Negative for dizziness, tremors, syncope, facial asymmetry, light-headedness, numbness and headaches. Hematological: Negative. Does not bruise/bleed easily. Psychiatric/Behavioral: Negative. Negative for confusion, hallucinations and sleep disturbance.

## 2023-10-29 PROBLEM — H61.23 BILATERAL HEARING LOSS DUE TO CERUMEN IMPACTION: Status: ACTIVE | Noted: 2023-10-29

## 2023-10-30 NOTE — ASSESSMENT & PLAN NOTE
Overall, she continues to be seizure-free on levetiracetam monotherapy. She continues to tolerate this well without any significant side effects. They did inquire about the possibility of decreasing or coming off of her medication, but given her prior stroke and residual aphasia and right-sided hemiparesis, she would be at high risk of additional seizures if not taking seizure medications. I would not recommend going on a lower dose since she is currently already on the minimally effective dose of levetiracetam.    --She will continue levetiracetam 500 mg twice a day. If she would have any additional seizures, her dose could be increased.

## 2023-10-30 NOTE — ASSESSMENT & PLAN NOTE
She continues to have difficulty with her hearing, and upon examination, continues to have bilateral cerumen impaction, obscuring her tympanic membranes.   We discussed techniques to manage this at home versus discussing with her PCP for further management

## 2023-11-24 DIAGNOSIS — E78.49 OTHER HYPERLIPIDEMIA: ICD-10-CM

## 2023-11-24 RX ORDER — ATORVASTATIN CALCIUM 40 MG/1
TABLET, FILM COATED ORAL
Qty: 30 TABLET | Refills: 0 | Status: SHIPPED | OUTPATIENT
Start: 2023-11-24

## 2023-12-28 DIAGNOSIS — E78.49 OTHER HYPERLIPIDEMIA: ICD-10-CM

## 2023-12-28 PROBLEM — H61.23 BILATERAL HEARING LOSS DUE TO CERUMEN IMPACTION: Status: RESOLVED | Noted: 2023-10-29 | Resolved: 2023-12-28

## 2023-12-28 RX ORDER — ATORVASTATIN CALCIUM 40 MG/1
TABLET, FILM COATED ORAL
Qty: 30 TABLET | Refills: 5 | Status: SHIPPED | OUTPATIENT
Start: 2023-12-28

## 2024-01-20 DIAGNOSIS — R06.02 SOB (SHORTNESS OF BREATH): ICD-10-CM

## 2024-01-21 DIAGNOSIS — I48.0 PAROXYSMAL ATRIAL FIBRILLATION (HCC): ICD-10-CM

## 2024-01-22 RX ORDER — ALBUTEROL SULFATE 90 UG/1
AEROSOL, METERED RESPIRATORY (INHALATION)
Qty: 6.7 G | Refills: 5 | Status: SHIPPED | OUTPATIENT
Start: 2024-01-22

## 2024-01-22 RX ORDER — APIXABAN 5 MG/1
TABLET, FILM COATED ORAL
Qty: 60 TABLET | Refills: 5 | Status: SHIPPED | OUTPATIENT
Start: 2024-01-22

## 2024-03-28 ENCOUNTER — OFFICE VISIT (OUTPATIENT)
Dept: FAMILY MEDICINE CLINIC | Facility: CLINIC | Age: 64
End: 2024-03-28
Payer: COMMERCIAL

## 2024-03-28 ENCOUNTER — TELEPHONE (OUTPATIENT)
Dept: FAMILY MEDICINE CLINIC | Facility: CLINIC | Age: 64
End: 2024-03-28

## 2024-03-28 VITALS
BODY MASS INDEX: 20.31 KG/M2 | WEIGHT: 134 LBS | TEMPERATURE: 97.3 F | HEART RATE: 58 BPM | DIASTOLIC BLOOD PRESSURE: 68 MMHG | SYSTOLIC BLOOD PRESSURE: 112 MMHG | OXYGEN SATURATION: 95 % | HEIGHT: 68 IN

## 2024-03-28 DIAGNOSIS — I48.0 PAROXYSMAL ATRIAL FIBRILLATION (HCC): Primary | ICD-10-CM

## 2024-03-28 DIAGNOSIS — E78.2 MIXED HYPERLIPIDEMIA: ICD-10-CM

## 2024-03-28 DIAGNOSIS — G40.109 LOCALIZATION-RELATED EPILEPSY (HCC): ICD-10-CM

## 2024-03-28 DIAGNOSIS — I69.351 HEMIPLEGIA OF RIGHT DOMINANT SIDE AS LATE EFFECT OF CEREBRAL INFARCTION, UNSPECIFIED HEMIPLEGIA TYPE (HCC): ICD-10-CM

## 2024-03-28 PROCEDURE — 99214 OFFICE O/P EST MOD 30 MIN: CPT | Performed by: FAMILY MEDICINE

## 2024-03-28 NOTE — PROGRESS NOTES
Name: Kimberlyn Ortiz      : 1960      MRN: [id number]  Encounter Provider: Vijay Moore MD  Encounter Date: 3/28/2024   Encounter department: [facility]    Assessment & Plan     1. Paroxysmal atrial fibrillation (HCC)  Assessment & Plan:  Continue with anticogulation and rate control of heart rate. Concerns about condition were addressed today.       2. Mixed hyperlipidemia  Assessment & Plan:  Patient  is stable with current medication and we discussed a low fat low cholesterol diet. Weight loss also discussed for this will help lower cholesterol also. Recheck lipids in 6 months.       3. 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, unspecified hemiplegia type (HCC)  Assessment & Plan:  Patient is stable  and will continue present plan of care and reassess at next routine visit. All questions about this problem from patient were answered today.       4. Localization-related epilepsy (HCC)  Assessment & Plan:  Patient is stable  and will continue present plan of care and reassess at next routine visit. All questions about this problem from patient were answered today.              Subjective     HPI  Review of Systems   Constitutional:  Negative for activity change, appetite change, fatigue and fever.   HENT:  Negative for congestion, ear pain, postnasal drip, rhinorrhea, sinus pressure, sinus pain, sneezing and sore throat.    Eyes:  Negative for pain and redness.   Respiratory:  Negative for apnea, cough, chest tightness, shortness of breath and wheezing.    Cardiovascular:  Negative for chest pain, palpitations and leg swelling.   Gastrointestinal:  Negative for abdominal pain, constipation, diarrhea, nausea and vomiting.   Endocrine: Negative for cold intolerance and heat intolerance.   Genitourinary:  Negative for difficulty urinating, dysuria, frequency, hematuria and urgency.   Musculoskeletal:  Positive for gait problem. Negative for  arthralgias, back pain and myalgias.   Skin:  Negative for rash.   Neurological:  Positive for weakness and numbness. Negative for dizziness, speech difficulty and headaches.   Hematological:  Does not bruise/bleed easily.   Psychiatric/Behavioral:  Negative for agitation, confusion and hallucinations.        Past Medical History:   Diagnosis Date   • Cervical cancer (HCC)    • Stroke (HCC)      Past Surgical History:   Procedure Laterality Date   • HYSTERECTOMY     • OVARY SURGERY       Family History   Problem Relation Age of Onset   • No Known Problems Mother    • No Known Problems Father    • No Known Problems Sister    • No Known Problems Sister    • No Known Problems Maternal Grandmother    • No Known Problems Maternal Grandfather    • No Known Problems Paternal Grandmother    • No Known Problems Paternal Grandfather    • No Known Problems Son    • No Known Problems Son    • No Known Problems Maternal Aunt    • No Known Problems Maternal Aunt    • No Known Problems Paternal Uncle    • No Known Problems Paternal Uncle      Social History     Socioeconomic History   • Marital status: Single     Spouse name: None   • Number of children: None   • Years of education: None   • Highest education level: None   Occupational History   • None   Tobacco Use   • Smoking status: Former     Passive exposure: Past   • Smokeless tobacco: Never   Vaping Use   • Vaping status: Never Used   Substance and Sexual Activity   • Alcohol use: Not Currently   • Drug use: Not Currently   • Sexual activity: None   Other Topics Concern   • None   Social History Narrative   • None     Social Determinants of Health     Financial Resource Strain: Not on file   Food Insecurity: No Food Insecurity (8/10/2022)    Hunger Vital Sign    • Worried About Running Out of Food in the Last Year: Never true    • Ran Out of Food in the Last Year: Never true   Transportation Needs: No Transportation Needs (8/10/2022)    PRAPARE - Transportation    • Lack of  "Transportation (Medical): No    • Lack of Transportation (Non-Medical): No   Physical Activity: Not on file   Stress: Not on file   Social Connections: Not on file   Intimate Partner Violence: Not on file   Housing Stability: Low Risk  (8/10/2022)    Housing Stability Vital Sign    • Unable to Pay for Housing in the Last Year: No    • Number of Places Lived in the Last Year: 1    • Unstable Housing in the Last Year: No     Current Outpatient Medications on File Prior to Visit   Medication Sig   • albuterol (PROVENTIL HFA,VENTOLIN HFA) 90 mcg/act inhaler INHALE 2 PUFFS EVERY 6 HOURS AS NEEDED FOR WHEEZING   • apixaban (Eliquis) 5 mg TAKE 1 TABLET BY MOUTH TWICE A DAY   • atorvastatin (LIPITOR) 40 mg tablet TAKE 1 TABLET BY MOUTH EVERY DAY   • levETIRAcetam (KEPPRA) 500 mg tablet Take 1 tablet (500 mg total) by mouth every 12 (twelve) hours   • metoprolol succinate (TOPROL-XL) 25 mg 24 hr tablet TAKE 1 TABLET (25 MG TOTAL) BY MOUTH DAILY.   • thiamine 100 MG tablet Take 100 mg by mouth daily (Patient not taking: Reported on 10/25/2023)     No Known Allergies    There is no immunization history on file for this patient.    Objective     /68 (BP Location: Left arm, Patient Position: Sitting, Cuff Size: Standard)   Pulse 58   Temp (!) 97.3 °F (36.3 °C)   Ht 5' 8\" (1.727 m)   Wt 60.8 kg (134 lb)   SpO2 95%   BMI 20.37 kg/m²     Physical Exam  Vijay Moore MD    "

## 2024-04-16 DIAGNOSIS — I48.0 PAROXYSMAL ATRIAL FIBRILLATION (HCC): ICD-10-CM

## 2024-04-16 RX ORDER — METOPROLOL SUCCINATE 25 MG/1
25 TABLET, EXTENDED RELEASE ORAL DAILY
Qty: 90 TABLET | Refills: 1 | Status: SHIPPED | OUTPATIENT
Start: 2024-04-16

## 2024-05-28 DIAGNOSIS — E78.49 OTHER HYPERLIPIDEMIA: ICD-10-CM

## 2024-05-28 DIAGNOSIS — I48.0 PAROXYSMAL ATRIAL FIBRILLATION (HCC): ICD-10-CM

## 2024-05-28 DIAGNOSIS — R06.02 SOB (SHORTNESS OF BREATH): ICD-10-CM

## 2024-05-28 RX ORDER — METOPROLOL SUCCINATE 25 MG/1
25 TABLET, EXTENDED RELEASE ORAL DAILY
Qty: 90 TABLET | Refills: 1 | Status: SHIPPED | OUTPATIENT
Start: 2024-05-28

## 2024-05-28 RX ORDER — ALBUTEROL SULFATE 90 UG/1
2 AEROSOL, METERED RESPIRATORY (INHALATION) EVERY 6 HOURS PRN
Qty: 6.7 G | Refills: 5 | Status: SHIPPED | OUTPATIENT
Start: 2024-05-28 | End: 2024-06-05 | Stop reason: SDUPTHER

## 2024-05-28 RX ORDER — ATORVASTATIN CALCIUM 40 MG/1
40 TABLET, FILM COATED ORAL DAILY
Qty: 30 TABLET | Refills: 5 | Status: SHIPPED | OUTPATIENT
Start: 2024-05-28

## 2024-05-28 NOTE — TELEPHONE ENCOUNTER
Medication: albuterol (PROVENTIL HFA,VENTOLIN HFA)     Dose/Frequency: 90 mcg /   Summary: INHALE 2 PUFFS EVERY 6 HOURS AS NEEDED FOR WHEEZING       Quantity: 6.7 g     Pharmacy: [facility]/pharmacy [address] [place] [address]     Office:   [x] PCP/Provider -   [] Speciality/Provider -     Does the patient have enough for 3 days?   [] Yes   [x] No - Send as HP to POD      Medication: atorvastatin (LIPITOR)     Dose/Frequency: 40 mg /  TAKE 1 TABLET BY MOUTH EVERY DAY     Quantity: 30 tablet    Pharmacy: [facility]/pharmacy [address] [place] [address]       Office:   [x] PCP/Provider -   [] Speciality/Provider -     Does the patient have enough for 3 days?   [] Yes   [x] No - Send as HP to POD      Medication: metoprolol succinate (TOPROL-XL)     Dose/Frequency: 25 mg/  TAKE 1 TABLET (25 MG TOTAL) BY MOUTH DAILY.     Quantity: 90 tablet    Pharmacy: [facility]/pharmacy [address] [place] [address]     Office:   [x] PCP/Provider -   [] Speciality/Provider -     Does the patient have enough for 3 days?   [] Yes   [x] No - Send as HP to POD

## 2024-06-05 ENCOUNTER — OFFICE VISIT (OUTPATIENT)
Dept: FAMILY MEDICINE CLINIC | Facility: CLINIC | Age: 64
End: 2024-06-05
Payer: COMMERCIAL

## 2024-06-05 ENCOUNTER — APPOINTMENT (OUTPATIENT)
Dept: RADIOLOGY | Facility: MEDICAL CENTER | Age: 64
End: 2024-06-05
Payer: COMMERCIAL

## 2024-06-05 VITALS
RESPIRATION RATE: 18 BRPM | OXYGEN SATURATION: 92 % | SYSTOLIC BLOOD PRESSURE: 108 MMHG | HEART RATE: 71 BPM | TEMPERATURE: 97.4 F | DIASTOLIC BLOOD PRESSURE: 78 MMHG | WEIGHT: 126 LBS | HEIGHT: 68 IN | BODY MASS INDEX: 19.1 KG/M2

## 2024-06-05 DIAGNOSIS — R06.02 SHORTNESS OF BREATH: ICD-10-CM

## 2024-06-05 DIAGNOSIS — J06.9 URI WITH COUGH AND CONGESTION: ICD-10-CM

## 2024-06-05 DIAGNOSIS — R06.02 SHORTNESS OF BREATH: Primary | ICD-10-CM

## 2024-06-05 PROCEDURE — 71046 X-RAY EXAM CHEST 2 VIEWS: CPT

## 2024-06-05 PROCEDURE — 99213 OFFICE O/P EST LOW 20 MIN: CPT | Performed by: NURSE PRACTITIONER

## 2024-06-05 RX ORDER — BENZONATATE 100 MG/1
100 CAPSULE ORAL 3 TIMES DAILY PRN
Qty: 60 CAPSULE | Refills: 0 | Status: SHIPPED | OUTPATIENT
Start: 2024-06-05

## 2024-06-05 RX ORDER — AZITHROMYCIN 250 MG/1
250 TABLET, FILM COATED ORAL EVERY 24 HOURS
Qty: 10 TABLET | Refills: 0 | Status: SHIPPED | OUTPATIENT
Start: 2024-06-05 | End: 2024-06-15

## 2024-06-05 RX ORDER — ALBUTEROL SULFATE 90 UG/1
2 AEROSOL, METERED RESPIRATORY (INHALATION) EVERY 6 HOURS PRN
Qty: 6.7 G | Refills: 1 | Status: SHIPPED | OUTPATIENT
Start: 2024-06-05

## 2024-06-05 RX ORDER — PREDNISONE 10 MG/1
10 TABLET ORAL 2 TIMES DAILY WITH MEALS
Qty: 6 TABLET | Refills: 0 | Status: SHIPPED | OUTPATIENT
Start: 2024-06-05 | End: 2024-06-08

## 2024-06-05 NOTE — PROGRESS NOTES
Assessment/Plan:      Diagnoses and all orders for this visit:    Shortness of breath  -     XR chest pa & lateral; Future  -     albuterol (PROVENTIL HFA,VENTOLIN HFA) 90 mcg/act inhaler; Inhale 2 puffs every 6 (six) hours as needed for wheezing    URI with cough and congestion  -     benzonatate (TESSALON PERLES) 100 mg capsule; Take 1 capsule (100 mg total) by mouth 3 (three) times a day as needed for cough    Wet read cxr was normal.Pt was informed. Uncomplicated bronchitis will treat due to longevity of complaint. Dosing all possible side effects of the prescribed medications or medications that had been prescribed in the past were reviewed and all questions were answered.  Patient verbalized agreement and understanding of the plan of care as outlined during the office visit today return to office as indicated or sooner if a problem arises.            Subjective:     Patient ID: Kimberlyn Ortiz is a 63 y.o. female.    Ongoing cough not getting better and is wheezing.        Review of Systems   Constitutional:  Positive for chills.   HENT:  Positive for congestion, rhinorrhea and sore throat.    Respiratory:  Positive for cough.          Objective:     Physical Exam  Vitals and nursing note reviewed.   Constitutional:       General: She is not in acute distress.     Appearance: She is well-developed. She is not diaphoretic.   HENT:      Head: Normocephalic and atraumatic.      Mouth/Throat:      Mouth: Oropharynx is clear and moist.      Pharynx: Oropharynx is clear.   Eyes:      Extraocular Movements: EOM normal.   Cardiovascular:      Rate and Rhythm: Normal rate and regular rhythm.      Heart sounds: Normal heart sounds.   Pulmonary:      Effort: Pulmonary effort is normal.      Breath sounds: Wheezing present.   Musculoskeletal:      Cervical back: Neck supple.   Skin:     General: Skin is dry.   Neurological:      Mental Status: She is alert and oriented to person, place, and time.   Psychiatric:         Mood  and Affect: Mood and affect normal.         Behavior: Behavior normal.         Thought Content: Thought content normal.

## 2024-06-12 DIAGNOSIS — E78.49 OTHER HYPERLIPIDEMIA: ICD-10-CM

## 2024-06-12 DIAGNOSIS — I48.0 PAROXYSMAL ATRIAL FIBRILLATION (HCC): ICD-10-CM

## 2024-06-12 RX ORDER — ATORVASTATIN CALCIUM 40 MG/1
40 TABLET, FILM COATED ORAL DAILY
Qty: 30 TABLET | Refills: 5 | Status: SHIPPED | OUTPATIENT
Start: 2024-06-12

## 2024-06-24 ENCOUNTER — VBI (OUTPATIENT)
Dept: ADMINISTRATIVE | Facility: OTHER | Age: 64
End: 2024-06-24

## 2024-06-24 NOTE — TELEPHONE ENCOUNTER
06/24/24 10:14 AM     Chart reviewed for Pap Smear (HPV) aka Cervical Cancer Screening ; nothing is submitted to the patient's insurance at this time.     Monica Izquierdo   [facility]

## 2024-06-25 ENCOUNTER — TELEPHONE (OUTPATIENT)
Age: 64
End: 2024-06-25

## 2024-06-25 NOTE — TELEPHONE ENCOUNTER
Son called, received a letter from [facility] regarding mother's chest x-ray.  He was concerned with the findings, he said no one had called him with the findings and he is worried that there may be something more going on.  Please call sonOlvin at [phone].

## 2024-06-25 NOTE — TELEPHONE ENCOUNTER
Phone call to Olvin (son) he stated that he received and read me the letter that pt had significant findings on her xray and should be followed up with PCP.  Please review and advise.   Thanks

## 2024-06-26 NOTE — TELEPHONE ENCOUNTER
The results do not change called 6/6/24 no pneumonia and radiology read the spine degeneration as age related and chronic compression because it was on images from 2022 and is unchanged.

## 2024-07-03 DIAGNOSIS — R56.9 NEW ONSET SEIZURE (HCC): ICD-10-CM

## 2024-07-03 RX ORDER — LEVETIRACETAM 500 MG/1
500 TABLET ORAL EVERY 12 HOURS
Qty: 60 TABLET | Refills: 0 | Status: SHIPPED | OUTPATIENT
Start: 2024-07-03

## 2024-07-17 DIAGNOSIS — J06.9 URI WITH COUGH AND CONGESTION: ICD-10-CM

## 2024-07-18 RX ORDER — BENZONATATE 100 MG/1
CAPSULE ORAL
Qty: 60 CAPSULE | Refills: 0 | Status: SHIPPED | OUTPATIENT
Start: 2024-07-18

## 2024-08-21 ENCOUNTER — OFFICE VISIT (OUTPATIENT)
Dept: FAMILY MEDICINE CLINIC | Facility: CLINIC | Age: 64
End: 2024-08-21
Payer: COMMERCIAL

## 2024-08-21 ENCOUNTER — VBI (OUTPATIENT)
Dept: ADMINISTRATIVE | Facility: OTHER | Age: 64
End: 2024-08-21

## 2024-08-21 VITALS
OXYGEN SATURATION: 96 % | SYSTOLIC BLOOD PRESSURE: 118 MMHG | HEART RATE: 70 BPM | TEMPERATURE: 98.8 F | HEIGHT: 68 IN | DIASTOLIC BLOOD PRESSURE: 68 MMHG | BODY MASS INDEX: 18.01 KG/M2 | WEIGHT: 118.8 LBS

## 2024-08-21 DIAGNOSIS — Z12.31 SCREENING MAMMOGRAM FOR HIGH-RISK PATIENT: ICD-10-CM

## 2024-08-21 DIAGNOSIS — I48.0 PAROXYSMAL ATRIAL FIBRILLATION (HCC): ICD-10-CM

## 2024-08-21 DIAGNOSIS — I63.512 ACUTE ISCHEMIC LEFT MCA STROKE (HCC): ICD-10-CM

## 2024-08-21 DIAGNOSIS — F32.A DEPRESSION, UNSPECIFIED DEPRESSION TYPE: ICD-10-CM

## 2024-08-21 DIAGNOSIS — Z00.00 WELL ADULT EXAM: Primary | ICD-10-CM

## 2024-08-21 DIAGNOSIS — E78.2 MIXED HYPERLIPIDEMIA: ICD-10-CM

## 2024-08-21 DIAGNOSIS — R53.83 OTHER FATIGUE: ICD-10-CM

## 2024-08-21 DIAGNOSIS — G40.109 LOCALIZATION-RELATED EPILEPSY (HCC): ICD-10-CM

## 2024-08-21 DIAGNOSIS — R06.02 SHORTNESS OF BREATH: ICD-10-CM

## 2024-08-21 DIAGNOSIS — J01.00 ACUTE NON-RECURRENT MAXILLARY SINUSITIS: ICD-10-CM

## 2024-08-21 DIAGNOSIS — I48.91 ATRIAL FIBRILLATION WITH RVR (HCC): ICD-10-CM

## 2024-08-21 PROCEDURE — 99396 PREV VISIT EST AGE 40-64: CPT | Performed by: FAMILY MEDICINE

## 2024-08-21 RX ORDER — ALBUTEROL SULFATE 90 UG/1
2 AEROSOL, METERED RESPIRATORY (INHALATION) EVERY 6 HOURS PRN
Qty: 6.7 G | Refills: 1 | Status: SHIPPED | OUTPATIENT
Start: 2024-08-21

## 2024-08-21 RX ORDER — LORATADINE 10 MG/1
10 TABLET ORAL DAILY
Qty: 30 TABLET | Refills: 1 | Status: SHIPPED | OUTPATIENT
Start: 2024-08-21

## 2024-08-21 RX ORDER — AMOXICILLIN 500 MG/1
500 CAPSULE ORAL EVERY 8 HOURS SCHEDULED
Qty: 30 CAPSULE | Refills: 0 | Status: SHIPPED | OUTPATIENT
Start: 2024-08-21 | End: 2024-08-31

## 2024-08-21 RX ORDER — FLUTICASONE PROPIONATE 50 MCG
1 SPRAY, SUSPENSION (ML) NASAL DAILY
Qty: 9.9 ML | Refills: 1 | Status: SHIPPED | OUTPATIENT
Start: 2024-08-21

## 2024-08-21 NOTE — PROGRESS NOTES
Ambulatory Visit  Name: Kimberlyn Ortiz      : 1960      MRN: [id number]  Encounter Provider: Vijay Moore MD  Encounter Date: 2024   Encounter department: [facility]    Assessment & Plan   1. Well adult exam  2. Paroxysmal atrial fibrillation (HCC)  Assessment & Plan:  Continue with anticogulation and rate control of heart rate. Concerns about condition were addressed today.   3. Mixed hyperlipidemia  Assessment & Plan:  Patient  is stable with current medication and we discussed a low fat low cholesterol diet. Weight loss also discussed for this will help lower cholesterol also. Recheck lipids in 6 months.   Orders:  -     Lipid Panel with Direct LDL reflex; Future  4. Localization-related epilepsy (HCC)  Assessment & Plan:  Patient is stable  and will continue present plan of care and reassess at next routine visit. All questions about this problem from patient were answered today.   5. Atrial fibrillation with RVR (HCC)  Assessment & Plan:  Continue with anticogulation and rate control of heart rate. Concerns about condition were addressed today.   6. Acute ischemic left MCA stroke (HCC)  Assessment & Plan:  Continue with anticogulation and rate control of heart rate. Concerns about condition were addressed today.   7. Shortness of breath  -     albuterol (PROVENTIL HFA,VENTOLIN HFA) 90 mcg/act inhaler; Inhale 2 puffs every 6 (six) hours as needed for wheezing  8. Depression, unspecified depression type  -     sertraline (Zoloft) 50 mg tablet; Take 1 tablet (50 mg total) by mouth daily  9. Acute non-recurrent maxillary sinusitis  -     amoxicillin (AMOXIL) 500 mg capsule; Take 1 capsule (500 mg total) by mouth every 8 (eight) hours for 10 days  -     loratadine (CLARITIN) 10 mg tablet; Take 1 tablet (10 mg total) by mouth daily  -     fluticasone (FLONASE) 50 mcg/act nasal spray; 1 spray into each nostril daily  10. Other fatigue  -     Comprehensive metabolic panel;  Future  -     CBC and differential; Future  -     TSH, 3rd generation with Free T4 reflex; Future  -     Magnesium; Future  -     Uric acid; Future  -     UA/M w/rflx Culture, Comp  11. Screening mammogram for high-risk patient  -     Mammo screening bilateral w 3d & cad; Future; Expected date: 08/21/2024         History of Present Illness     64-year-old female today for yearly checkup as well as checkup on multimedical problems.  Patient history of a CVA with right-sided weakness.  Patient also complaining about having problems with depression and would like to go back on her Zoloft which we will oblige her today.  Patient also with some recent weight loss which may be attributed to her depression.  Patient also with some sinus symptoms with some sinus pain and pressure as well as some cough and congestion she has been having we think for the last week or so.  Patient does have some difficulty with communication but she is better since she first had her stroke.  Patient also history of tobacco abuse and she is still tobacco free.  Patient will start on Zoloft 50 mg once a day and we will see her back in approximately 6 weeks to see how she is doing with that we will also see if this is going also some in her appetite.  We may want to advance her dose to 100 mg at that time but we will see how she is doing with resuming her Zoloft.  Patient is also due for yearly lab work to order for her today      Review of Systems  Past Medical History:   Diagnosis Date   • Cervical cancer (HCC)    • Stroke (HCC)      Past Surgical History:   Procedure Laterality Date   • HYSTERECTOMY     • OVARY SURGERY       Family History   Problem Relation Age of Onset   • No Known Problems Mother    • No Known Problems Father    • No Known Problems Sister    • No Known Problems Sister    • No Known Problems Maternal Grandmother    • No Known Problems Maternal Grandfather    • No Known Problems Paternal Grandmother    • No Known Problems  "Paternal Grandfather    • No Known Problems Son    • No Known Problems Son    • No Known Problems Maternal Aunt    • No Known Problems Maternal Aunt    • No Known Problems Paternal Uncle    • No Known Problems Paternal Uncle      Social History     Tobacco Use   • Smoking status: Former     Passive exposure: Past   • Smokeless tobacco: Never   Vaping Use   • Vaping status: Never Used   Substance and Sexual Activity   • Alcohol use: Not Currently   • Drug use: Not Currently   • Sexual activity: Not on file     Current Outpatient Medications on File Prior to Visit   Medication Sig   • apixaban (Eliquis) 5 mg Take 1 tablet (5 mg total) by mouth 2 (two) times a day   • atorvastatin (LIPITOR) 40 mg tablet Take 1 tablet (40 mg total) by mouth daily   • levETIRAcetam (KEPPRA) 500 mg tablet Take 1 tablet (500 mg total) by mouth every 12 (twelve) hours   • metoprolol succinate (TOPROL-XL) 25 mg 24 hr tablet Take 1 tablet (25 mg total) by mouth daily   • [DISCONTINUED] albuterol (PROVENTIL HFA,VENTOLIN HFA) 90 mcg/act inhaler Inhale 2 puffs every 6 (six) hours as needed for wheezing   • benzonatate (TESSALON PERLES) 100 mg capsule TAKE 1 CAPSULE BY MOUTH THREE TIMES A DAY AS NEEDED FOR COUGH (Patient not taking: Reported on 8/21/2024)   • thiamine 100 MG tablet Take 100 mg by mouth daily (Patient not taking: Reported on 10/25/2023)     No Known Allergies    There is no immunization history on file for this patient.  Objective     /68   Pulse 70   Temp 98.8 °F (37.1 °C)   Ht 5' 8\" (1.727 m)   Wt 53.9 kg (118 lb 12.8 oz)   SpO2 96%   BMI 18.06 kg/m²     Physical Exam    "

## 2024-08-21 NOTE — TELEPHONE ENCOUNTER
08/21/24 7:40 AM     Chart reviewed for CRC: Colonoscopy ; nothing is submitted to the patient's insurance at this time.     Monica Izquierdo   [facility]

## 2024-09-20 ENCOUNTER — TELEPHONE (OUTPATIENT)
Dept: FAMILY MEDICINE CLINIC | Facility: CLINIC | Age: 64
End: 2024-09-20

## 2024-09-30 ENCOUNTER — TELEPHONE (OUTPATIENT)
Age: 64
End: 2024-09-30

## 2024-09-30 NOTE — TELEPHONE ENCOUNTER
PT's son, Olvin, said that PT did not take the amoxicillin correctly, and due to this did not rectify the coughing issues. He is requesting if possible another regimen of amoxicillin to please be ordered.    Please advise    thankYou

## 2024-10-03 ENCOUNTER — OFFICE VISIT (OUTPATIENT)
Dept: FAMILY MEDICINE CLINIC | Facility: CLINIC | Age: 64
End: 2024-10-03
Payer: COMMERCIAL

## 2024-10-03 VITALS
TEMPERATURE: 97.3 F | OXYGEN SATURATION: 90 % | RESPIRATION RATE: 16 BRPM | SYSTOLIC BLOOD PRESSURE: 94 MMHG | WEIGHT: 114 LBS | HEIGHT: 68 IN | DIASTOLIC BLOOD PRESSURE: 62 MMHG | BODY MASS INDEX: 17.28 KG/M2 | HEART RATE: 81 BPM

## 2024-10-03 DIAGNOSIS — J06.9 URI WITH COUGH AND CONGESTION: ICD-10-CM

## 2024-10-03 DIAGNOSIS — J41.0 SIMPLE CHRONIC BRONCHITIS (HCC): ICD-10-CM

## 2024-10-03 DIAGNOSIS — R06.00 DYSPNEA, UNSPECIFIED TYPE: Primary | ICD-10-CM

## 2024-10-03 LAB
DME PARACHUTE DELIVERY DATE REQUESTED: NORMAL
DME PARACHUTE ITEM DESCRIPTION: NORMAL
DME PARACHUTE ORDER STATUS: NORMAL
DME PARACHUTE SUPPLIER NAME: NORMAL
DME PARACHUTE SUPPLIER PHONE: NORMAL

## 2024-10-03 PROCEDURE — 99214 OFFICE O/P EST MOD 30 MIN: CPT | Performed by: NURSE PRACTITIONER

## 2024-10-03 RX ORDER — AZITHROMYCIN 250 MG/1
TABLET, FILM COATED ORAL
Qty: 6 TABLET | Refills: 0 | Status: SHIPPED | OUTPATIENT
Start: 2024-10-03 | End: 2024-10-07

## 2024-10-03 RX ORDER — IPRATROPIUM BROMIDE AND ALBUTEROL SULFATE 2.5; .5 MG/3ML; MG/3ML
3 SOLUTION RESPIRATORY (INHALATION) 4 TIMES DAILY
Qty: 30 ML | Refills: 2 | Status: SHIPPED | OUTPATIENT
Start: 2024-10-03

## 2024-10-03 RX ORDER — METHYLPREDNISOLONE 4 MG
TABLET, DOSE PACK ORAL
Qty: 21 EACH | Refills: 0 | Status: SHIPPED | OUTPATIENT
Start: 2024-10-03 | End: 2024-10-09 | Stop reason: ALTCHOICE

## 2024-10-03 RX ORDER — BUDESONIDE AND FORMOTEROL FUMARATE DIHYDRATE 160; 4.5 UG/1; UG/1
2 AEROSOL RESPIRATORY (INHALATION) 2 TIMES DAILY
Qty: 10.2 G | Refills: 5 | Status: SHIPPED | OUTPATIENT
Start: 2024-10-03

## 2024-10-03 NOTE — PROGRESS NOTES
Assessment/Plan:      Diagnoses and all orders for this visit:    Dyspnea, unspecified type  -     XR chest pa and lateral; Future    Simple chronic bronchitis (HCC)  -     budesonide-formoterol (Symbicort) 160-4.5 mcg/act inhaler; Inhale 2 puffs 2 (two) times a day Rinse mouth after use.  -     ipratropium-albuterol (DUO-NEB) 0.5-2.5 mg/3 mL nebulizer solution; Take 3 mL by nebulization 4 (four) times a day  -     Ambulatory Referral to Pulmonology; Future    URI with cough and congestion  -     azithromycin (Zithromax) 250 mg tablet; Take 2 tablets (500 mg total) by mouth daily for 1 day, THEN 1 tablet (250 mg total) daily for 4 days.  -     methylPREDNISolone 4 MG tablet therapy pack; Use as directed on package    Other orders  -     Adult Nebulizer Package        Physical assessment unremarkable stepped were noted.  Patient may be suffering with COPD without a formal diagnosis.  Symptoms are consistent we will start Symbicort to be used as directed and follow-up back in the office in 7 to 10 days for evaluation.  Also referral for pulmonology was given.  DuoNebs were sent over also and nebulizer apparatus was also sent oral steroids due to the wheezing.  Patient is advised if no improvement in the next 3 to 4 days she is to complete the chest x-ray..Dosing all possible side effects of the prescribed medications or medications that had been prescribed in the past were reviewed and all questions were answered.  Patient verbalized agreement and understanding of the plan of care as outlined during the office visit today return to office as indicated or sooner if a problem arises.  RTO as needed or for next scheduled appt. All questions answered.    Subjective:     Patient ID: Kimberlyn Ortiz is a 64 y.o. female.      Patient is here with a complaint of upper respiratory tract discomfort has had a cough runny nose and some nasal congestion.  Denies any need nausea vomiting diarrhea chest pains or shortness of breath.   All over-the-counter medication attempted arm were unsuccessful.            Review of Systems   Constitutional:  Positive for chills.   HENT:  Positive for congestion, rhinorrhea and sore throat.    Respiratory:  Positive for cough and wheezing.          Objective:     Physical Exam  Constitutional:       General: She is not in acute distress.     Appearance: She is not diaphoretic.   HENT:      Head: Atraumatic.      Nose: Mucosal edema present.      Right Sinus: Frontal sinus tenderness present.      Left Sinus: Frontal sinus tenderness present.      Mouth/Throat:      Pharynx: Oropharynx is clear.   Cardiovascular:      Rate and Rhythm: Normal rate and regular rhythm.      Heart sounds: Normal heart sounds.   Pulmonary:      Effort: Pulmonary effort is normal.      Breath sounds: Wheezing present.   Musculoskeletal:         General: Normal range of motion.      Cervical back: Normal range of motion.

## 2024-10-09 ENCOUNTER — CONSULT (OUTPATIENT)
Age: 64
End: 2024-10-09
Payer: COMMERCIAL

## 2024-10-09 VITALS
HEART RATE: 62 BPM | DIASTOLIC BLOOD PRESSURE: 68 MMHG | HEIGHT: 68 IN | BODY MASS INDEX: 18.04 KG/M2 | SYSTOLIC BLOOD PRESSURE: 98 MMHG | OXYGEN SATURATION: 96 % | RESPIRATION RATE: 16 BRPM | TEMPERATURE: 97.7 F | WEIGHT: 119 LBS

## 2024-10-09 DIAGNOSIS — R06.02 SHORTNESS OF BREATH: ICD-10-CM

## 2024-10-09 DIAGNOSIS — J41.0 SIMPLE CHRONIC BRONCHITIS (HCC): ICD-10-CM

## 2024-10-09 DIAGNOSIS — F17.211 CIGARETTE NICOTINE DEPENDENCE IN REMISSION: Primary | ICD-10-CM

## 2024-10-09 PROCEDURE — 99244 OFF/OP CNSLTJ NEW/EST MOD 40: CPT | Performed by: INTERNAL MEDICINE

## 2024-10-09 RX ORDER — ALBUTEROL SULFATE 90 UG/1
INHALANT RESPIRATORY (INHALATION)
Qty: 6.7 G | Refills: 1 | Status: SHIPPED | OUTPATIENT
Start: 2024-10-09

## 2024-10-09 NOTE — PROGRESS NOTES
Consultation - Pulmonary Medicine   Kimberlyn Ortiz 64 y.o. female MRN: [id number]    Physician Requesting Consult: Sherrie Mendoza  Reason for Consult: Bronchitis    Simple chronic bronchitis (HCC)  Had recent bronchitis episode that was persistent and required multimodal therapy  Completed steroids, antibiotics, and is on inhalers.  She did have significant improvement with this.  She is now feeling much better  Using Symbicort consistently twice a day  Has not received her nebulizer yet.  Does not need this routinely currently.  She should familiarize herself with its use and use when needed or if recurrent exacerbation  She also has a rescue albuterol inhaler if needed  Needs complete PFTs which have been ordered      Cigarette nicotine dependence in remission  She is a former smoker.  She quit about 2 years ago.  She has a 46-pack-year history  Has never had lung cancer screening before but does meet criteria.  Explained in detail to her son as well as her regarding the rationale for screening and they are agreeable to proceed  Baseline lung cancer screening evaluation has been ordered    Return in about 4 months (around 2/9/2025).      ______________________________________________________________________    HPI:    Kimberlyn Ortiz is a 64 y.o. female who presents ration of bronchitis with probable component of COPD.  She is a former smoker.  She quit about 2 years ago at the time of her stroke.  She has a 46-pack-year history of smoking.  She did not report any breathing difficulties prior to her stroke and was never formally diagnosed with COPD or bronchitis episodes.  More recently she had a upper respiratory illness that progressed to a significant bronchitis with protracted symptoms.  She was placed on antibiotics and eventually required steroids, Symbicort, and has recently been ordered a nebulizer as well.  After completing the course of steroids and antibiotics however her symptoms have improved significantly.   She is  continuing to take the Symbicort.    The patient does not offer any respiratory complaints today.  She does not report shortness of breath with exertion.  She does have limited mobility due to some residual hemiplegia from her stroke.  She has no chronic headache.  No recent seizures.  No GERD, postnasal drip, or upper respiratory complaints.  Her weight and appetite have been stable.        Review of Systems:    Aside from what is mentioned in the HPI, the review of systems otherwise negative.    Current Medications:    Current Outpatient Medications:     albuterol (PROVENTIL HFA,VENTOLIN HFA) 90 mcg/act inhaler, Inhale 2 puffs every 6 (six) hours as needed for wheezing, Disp: 6.7 g, Rfl: 1    apixaban (Eliquis) 5 mg, Take 1 tablet (5 mg total) by mouth 2 (two) times a day, Disp: 60 tablet, Rfl: 5    atorvastatin (LIPITOR) 40 mg tablet, Take 1 tablet (40 mg total) by mouth daily, Disp: 30 tablet, Rfl: 5    budesonide-formoterol (Symbicort) 160-4.5 mcg/act inhaler, Inhale 2 puffs 2 (two) times a day Rinse mouth after use., Disp: 10.2 g, Rfl: 5    fluticasone (FLONASE) 50 mcg/act nasal spray, 1 spray into each nostril daily, Disp: 9.9 mL, Rfl: 1    ipratropium-albuterol (DUO-NEB) 0.5-2.5 mg/3 mL nebulizer solution, Take 3 mL by nebulization 4 (four) times a day, Disp: 30 mL, Rfl: 2    levETIRAcetam (KEPPRA) 500 mg tablet, Take 1 tablet (500 mg total) by mouth every 12 (twelve) hours, Disp: 60 tablet, Rfl: 0    loratadine (CLARITIN) 10 mg tablet, Take 1 tablet (10 mg total) by mouth daily, Disp: 30 tablet, Rfl: 1    metoprolol succinate (TOPROL-XL) 25 mg 24 hr tablet, Take 1 tablet (25 mg total) by mouth daily, Disp: 90 tablet, Rfl: 1    sertraline (Zoloft) 50 mg tablet, Take 1 tablet (50 mg total) by mouth daily, Disp: 30 tablet, Rfl: 1    Historical Information   Past Medical History:   Diagnosis Date    Cervical cancer (HCC)     Stroke (HCC)      Past Surgical History:   Procedure Laterality Date     "HYSTERECTOMY      OVARY SURGERY       Social History   Social History     Tobacco Use   Smoking Status Former    Current packs/day: 0.00    Average packs/day: 1 pack/day for 46.3 years (46.3 ttl pk-yrs)    Types: Cigarettes    Start date: 1975    Quit date: 10/15/2021    Years since quittin.9    Passive exposure: Past   Smokeless Tobacco Never       Family History:   Family History   Problem Relation Age of Onset    Stroke Mother     Heart attack Father     No Known Problems Sister     No Known Problems Sister     No Known Problems Maternal Grandmother     No Known Problems Maternal Grandfather     No Known Problems Paternal Grandmother     No Known Problems Paternal Grandfather     No Known Problems Son     No Known Problems Son     No Known Problems Maternal Aunt     No Known Problems Maternal Aunt     No Known Problems Paternal Uncle     No Known Problems Paternal Uncle     Lung disease Neg Hx          PhysicalExamination:  Vitals:   BP 98/68 (BP Location: Left arm, Patient Position: Sitting, Cuff Size: Standard)   Pulse 62   Temp 97.7 °F (36.5 °C)   Resp 16   Ht 5' 8\" (1.727 m)   Wt 54 kg (119 lb)   SpO2 96%   BMI 18.09 kg/m²     Gen:  Comfortable on room air.  No conversational dyspnea.  Slightly bizarre affect.  HEENT:  Conjugate gaze.  sclerae anicteric.  Oropharynx moist  Neck: Trachea is midline. No JVD. No adenopathy  Chest: Excursion is symmetric.  Lungs are clear but breath sounds decreased and slightly distant.  Cardiac: Heart tones are regular. no murmur appreciated  Abdomen:  benign  Extremities: No edema.  Ambulates with a cane and has hemiplegic gait  Neuro:  Normal speech and mentation      Diagnostic Data:  Labs:  I personally reviewed the most recent laboratory data pertinent to today's visit    Lab Results   Component Value Date    WBC 4.76 2023    HGB 14.4 2023    HCT 45.0 2023    MCV 94 2023     2023     Lab Results   Component Value Date    " "CALCIUM 9.7 09/22/2023    K 3.8 09/22/2023    CO2 30 09/22/2023     09/22/2023    BUN 12 09/22/2023    CREATININE 0.69 09/22/2023     No results found for: \"IGE\"  Lab Results   Component Value Date    ALT 15 09/22/2023    AST 23 09/22/2023    ALKPHOS 83 09/22/2023       PFT results:  No prior PFTs    Imaging:  I personally reviewed the images on the PAC system pertinent to today's visit  June 2024 chest x-ray shows clear lung vang.    Wilman Mccoy MD  "

## 2024-10-09 NOTE — ASSESSMENT & PLAN NOTE
Had recent bronchitis episode that was persistent and required multimodal therapy  Completed steroids, antibiotics, and is on inhalers.  She did have significant improvement with this.  She is now feeling much better  Using Symbicort consistently twice a day  Has not received her nebulizer yet.  Does not need this routinely currently.  She should familiarize herself with its use and use when needed or if recurrent exacerbation  She also has a rescue albuterol inhaler if needed  Needs complete PFTs which have been ordered

## 2024-10-09 NOTE — ASSESSMENT & PLAN NOTE
She is a former smoker.  She quit about 2 years ago.  She has a 46-pack-year history  Has never had lung cancer screening before but does meet criteria.  Explained in detail to her son as well as her regarding the rationale for screening and they are agreeable to proceed  Baseline lung cancer screening evaluation has been ordered

## 2024-10-10 ENCOUNTER — TELEPHONE (OUTPATIENT)
Age: 64
End: 2024-10-10

## 2024-10-10 ENCOUNTER — OFFICE VISIT (OUTPATIENT)
Dept: FAMILY MEDICINE CLINIC | Facility: CLINIC | Age: 64
End: 2024-10-10
Payer: COMMERCIAL

## 2024-10-10 VITALS
RESPIRATION RATE: 16 BRPM | OXYGEN SATURATION: 96 % | TEMPERATURE: 97.2 F | SYSTOLIC BLOOD PRESSURE: 100 MMHG | WEIGHT: 115 LBS | HEIGHT: 68 IN | BODY MASS INDEX: 17.43 KG/M2 | DIASTOLIC BLOOD PRESSURE: 70 MMHG | HEART RATE: 75 BPM

## 2024-10-10 DIAGNOSIS — J41.0 SIMPLE CHRONIC BRONCHITIS (HCC): Primary | ICD-10-CM

## 2024-10-10 DIAGNOSIS — I69.351 HEMIPARESIS AFFECTING RIGHT SIDE AS LATE EFFECT OF STROKE (HCC): ICD-10-CM

## 2024-10-10 DIAGNOSIS — M62.81 RIGHT-SIDED MUSCLE WEAKNESS: ICD-10-CM

## 2024-10-10 PROCEDURE — 99213 OFFICE O/P EST LOW 20 MIN: CPT | Performed by: NURSE PRACTITIONER

## 2024-10-10 NOTE — TELEPHONE ENCOUNTER
Johann from [facility] called because she has tried several times to reach out to this pt. About her nebulizer but has not gotten an answer.  She has left messages and she has not received a call back. She will try again next week. The Pt. was in today to see Sherrie concerning her breathing.  Maybe someone from the office could reach out to her and ask her to call Johann from [facility]. Her number is [phone].    Ty

## 2024-10-10 NOTE — PROGRESS NOTES
Assessment/Plan:      Diagnoses and all orders for this visit:    Simple chronic bronchitis (HCC)    Hemiparesis affecting right side as late effect of stroke (HCC)  -     Ambulatory Referral to Physical Therapy; Future    Right-sided muscle weakness  -     Ambulatory Referral to Physical Therapy; Future        Physical assessment demonstrates a significant improvement in her respiration all lung fields no wheezing.  Will continue on the Symbicort as directed.  Patient states she has much more energy would like to have physical therapy for additional stroke recovery as she does experience some weakness I will place the order and we will contact her with the time of the day for the appointment.. VS were well controlled. Labs given is to complete possible fu discussion. RTO as needed or for next scheduled appt. All questions answered.    Subjective:     Patient ID: Kimberlyn Ortiz is a 64 y.o. female.      Patient is here for routine follow-up.  To review most recent labs.  To also discuss current state of health and any new problems that they may be experiencing.  Patient states that medications taken as prescribed and very well tolerated no new complaints at this time.            Review of Systems   Constitutional:  Negative for appetite change and fever.   HENT:  Negative for sinus pressure and sore throat.    Eyes:  Negative for pain.   Respiratory:  Negative for shortness of breath.    Cardiovascular:  Negative for chest pain.   Gastrointestinal:  Negative for abdominal pain.   Genitourinary:  Negative for dysuria.   Musculoskeletal:  Positive for myalgias. Negative for arthralgias.   Skin:  Negative for color change.   Neurological:  Positive for seizures, facial asymmetry, speech difficulty and weakness. Negative for light-headedness.   Psychiatric/Behavioral:  Positive for decreased concentration. Negative for behavioral problems.          Objective:     Physical Exam  Cardiovascular:      Rate and Rhythm: Normal  rate and regular rhythm.      Heart sounds: Normal heart sounds.   Pulmonary:      Effort: Pulmonary effort is normal.      Breath sounds: Normal breath sounds.   Neurological:      Mental Status: She is alert and oriented to person, place, and time. Mental status is at baseline.      Motor: Weakness present.      Coordination: Coordination abnormal.      Gait: Gait abnormal.      Deep Tendon Reflexes: Reflexes abnormal.

## 2024-10-11 LAB
DME PARACHUTE DELIVERY DATE EXPECTED: NORMAL
DME PARACHUTE DELIVERY DATE REQUESTED: NORMAL
DME PARACHUTE ITEM DESCRIPTION: NORMAL
DME PARACHUTE ORDER STATUS: NORMAL
DME PARACHUTE SUPPLIER NAME: NORMAL
DME PARACHUTE SUPPLIER PHONE: NORMAL

## 2024-10-13 DIAGNOSIS — F32.A DEPRESSION, UNSPECIFIED DEPRESSION TYPE: ICD-10-CM

## 2024-10-15 DIAGNOSIS — E78.49 OTHER HYPERLIPIDEMIA: ICD-10-CM

## 2024-10-15 RX ORDER — ATORVASTATIN CALCIUM 40 MG/1
40 TABLET, FILM COATED ORAL DAILY
Qty: 30 TABLET | Refills: 5 | Status: SHIPPED | OUTPATIENT
Start: 2024-10-15

## 2024-10-16 LAB
DME PARACHUTE DELIVERY DATE ACTUAL: NORMAL
DME PARACHUTE DELIVERY DATE EXPECTED: NORMAL
DME PARACHUTE DELIVERY DATE REQUESTED: NORMAL
DME PARACHUTE ITEM DESCRIPTION: NORMAL
DME PARACHUTE ORDER STATUS: NORMAL
DME PARACHUTE SUPPLIER NAME: NORMAL
DME PARACHUTE SUPPLIER PHONE: NORMAL

## 2024-12-04 DIAGNOSIS — I48.0 PAROXYSMAL ATRIAL FIBRILLATION (HCC): ICD-10-CM

## 2024-12-05 RX ORDER — APIXABAN 5 MG/1
5 TABLET, FILM COATED ORAL 2 TIMES DAILY
Qty: 60 TABLET | Refills: 0 | Status: SHIPPED | OUTPATIENT
Start: 2024-12-05

## 2024-12-10 ENCOUNTER — TELEPHONE (OUTPATIENT)
Dept: NEUROLOGY | Facility: CLINIC | Age: 64
End: 2024-12-10

## 2024-12-10 DIAGNOSIS — R56.9 NEW ONSET SEIZURE (HCC): ICD-10-CM

## 2024-12-10 RX ORDER — LEVETIRACETAM 500 MG/1
500 TABLET ORAL EVERY 12 HOURS
Qty: 60 TABLET | Refills: 2 | Status: SHIPPED | OUTPATIENT
Start: 2024-12-10

## 2024-12-10 NOTE — TELEPHONE ENCOUNTER
Reason for call:   [x] Refill   [] Prior Auth  [] Other:     Office:   [] PCP/Provider -   [x] Specialty/Provider - Neuro    Medication:   Levetiracetam (Keppra) 500mg- take 1 tablet by mouth every 12 hours.       Pharmacy: [facility]    Does the patient have enough for 3 days?   [] Yes   [x] No - Send as HP to POD

## 2024-12-11 NOTE — TELEPHONE ENCOUNTER
Scheduled patient with Dr Carreno in [facility] office 4/8/25 @ 2 pm added to wait list.    Please refill RX

## 2024-12-31 DIAGNOSIS — R06.02 SHORTNESS OF BREATH: ICD-10-CM

## 2025-01-01 RX ORDER — ALBUTEROL SULFATE 90 UG/1
INHALANT RESPIRATORY (INHALATION)
Qty: 6.7 G | Refills: 1 | Status: SHIPPED | OUTPATIENT
Start: 2025-01-01

## 2025-01-21 DIAGNOSIS — I48.0 PAROXYSMAL ATRIAL FIBRILLATION (HCC): ICD-10-CM

## 2025-01-23 RX ORDER — APIXABAN 5 MG/1
5 TABLET, FILM COATED ORAL 2 TIMES DAILY
Qty: 60 TABLET | Refills: 0 | Status: SHIPPED | OUTPATIENT
Start: 2025-01-23

## 2025-02-03 ENCOUNTER — HOSPITAL ENCOUNTER (OUTPATIENT)
Dept: PULMONOLOGY | Facility: HOSPITAL | Age: 65
Discharge: HOME | End: 2025-02-03
Attending: INTERNAL MEDICINE
Payer: COMMERCIAL

## 2025-02-03 DIAGNOSIS — J41.0 SIMPLE CHRONIC BRONCHITIS (HCC): ICD-10-CM

## 2025-02-03 PROCEDURE — 94060 EVALUATION OF WHEEZING: CPT | Performed by: INTERNAL MEDICINE

## 2025-02-03 PROCEDURE — 94760 N-INVAS EAR/PLS OXIMETRY 1: CPT

## 2025-02-03 PROCEDURE — 94726 PLETHYSMOGRAPHY LUNG VOLUMES: CPT | Performed by: INTERNAL MEDICINE

## 2025-02-03 PROCEDURE — 94726 PLETHYSMOGRAPHY LUNG VOLUMES: CPT

## 2025-02-03 PROCEDURE — 94729 DIFFUSING CAPACITY: CPT | Performed by: INTERNAL MEDICINE

## 2025-02-03 PROCEDURE — 94729 DIFFUSING CAPACITY: CPT

## 2025-02-03 PROCEDURE — 94060 EVALUATION OF WHEEZING: CPT

## 2025-02-03 RX ORDER — ALBUTEROL SULFATE 0.83 MG/ML
2.5 SOLUTION RESPIRATORY (INHALATION) ONCE
Status: COMPLETED | OUTPATIENT
Start: 2025-02-03 | End: 2025-02-03

## 2025-02-03 RX ADMIN — ALBUTEROL SULFATE 2.5 MG: 2.5 SOLUTION RESPIRATORY (INHALATION) at 11:37

## 2025-02-08 DIAGNOSIS — E78.49 OTHER HYPERLIPIDEMIA: ICD-10-CM

## 2025-02-10 RX ORDER — ATORVASTATIN CALCIUM 40 MG/1
40 TABLET, FILM COATED ORAL DAILY
Qty: 30 TABLET | Refills: 0 | OUTPATIENT
Start: 2025-02-10

## 2025-02-19 ENCOUNTER — TELEMEDICINE (OUTPATIENT)
Dept: NEUROLOGY | Facility: CLINIC | Age: 65
End: 2025-02-19
Payer: COMMERCIAL

## 2025-02-19 DIAGNOSIS — Z86.73 CHRONIC ISCHEMIC LEFT MCA STROKE: ICD-10-CM

## 2025-02-19 DIAGNOSIS — G40.109 FOCAL EPILEPSY (HCC): Primary | ICD-10-CM

## 2025-02-19 PROCEDURE — 99214 OFFICE O/P EST MOD 30 MIN: CPT | Performed by: PSYCHIATRY & NEUROLOGY

## 2025-02-19 RX ORDER — LEVETIRACETAM 500 MG/1
500 TABLET ORAL EVERY 12 HOURS
Qty: 180 TABLET | Refills: 3 | Status: SHIPPED | OUTPATIENT
Start: 2025-02-19

## 2025-02-19 NOTE — PROGRESS NOTES
Neurology Ambulatory Visit  Name: Kimberlyn Ortiz       : 1960       MRN: [id number]   Encounter Provider: Miguel Carreno MD   Encounter Date: 2025  Encounter department: NEUROLOGY [facility]    Assessment and Plan  Assessment & Plan  Focal epilepsy (HCC)  Overall she has continued to do well on levetiracetam monotherapy and has not had any additional seizures on this dose, and given her prior stroke we will need to continue to stay on this medication going forward.  Because she is doing well I will not make any changes to her medications today.    --She will continue levetiracetam 500 mg twice a day.  She would have additional seizures, her dose could be increased  Chronic ischemic left MCA stroke  They have noticed some improvement in her Po stroke symptoms and have not noticed any new symptoms concerning for stroke.  She will continue to take Eliquis for secondary stroke prevention.    She will Return in about 1 year (around 2026).    History of Present Illness     HPI   Kimberlyn Ortiz is a 64 y.o. female with  left MCA stroke due to Atrial Fibrillation, now on anticoagulation with Eliquis and resulting focal epilepsy, aphasia, and right sided spastic hemiparesis, who is returning to Neurology office for follow up of her seizures.    Current seizure medications:  1. Levetiracetam 500 mg twice a day.   2. Eliquis 5 mg twice a day   Other medications as per Epic.    Since her last visit, she has been doing a lot better. She has been talking a lot better and is walking better and has more function of her leg.  She has been walking better and she is at times able to hold a conversation.     She has continued to tolerate Levetiracetam well without any seizures or side effects. Overall, they are quite happy with how she is doing.     Prior Seizure Medications: none      Review of Systems  I have personally reviewed the MA's review of systems that was entered in a separate note    Objective    There were no vitals taken for this visit.   Physical Exam  Neurologic Exam    Administrative Statements     Voice recognition software was used in the generation of this note. There may be unintentional errors including grammatical errors, spelling errors, or pronoun errors.    Administrative Statements   Encounter provider Miguel Carreno MD    The Patient is located at Home and in the following state in which I hold an active license [place].    The patient was identified by name and date of birth. Kimberlyn Ortiz was informed that this is a telemedicine visit and that the visit is being conducted through the Epic Embedded platform. She agrees to proceed..  My office door was closed. No one else was in the room.  She acknowledged consent and understanding of privacy and security of the video platform. The patient has agreed to participate and understands they can discontinue the visit at any time.    I have spent a total time of 10 minutes in caring for this patient on the day of the visit/encounter including Diagnostic results.    Her history was also obtained from her son, who was present at today's visit via video.

## 2025-02-21 ENCOUNTER — HOSPITAL ENCOUNTER (OUTPATIENT)
Dept: CT IMAGING | Facility: HOSPITAL | Age: 65
Discharge: HOME | End: 2025-02-21
Attending: INTERNAL MEDICINE

## 2025-02-21 DIAGNOSIS — F17.211 CIGARETTE NICOTINE DEPENDENCE IN REMISSION: ICD-10-CM

## 2025-02-24 NOTE — ASSESSMENT & PLAN NOTE
Overall she has continued to do well on levetiracetam monotherapy and has not had any additional seizures on this dose, and given her prior stroke we will need to continue to stay on this medication going forward.  Because she is doing well I will not make any changes to her medications today.    --She will continue levetiracetam 500 mg twice a day.  She would have additional seizures, her dose could be increased

## 2025-02-24 NOTE — ASSESSMENT & PLAN NOTE
They have noticed some improvement in her Po stroke symptoms and have not noticed any new symptoms concerning for stroke.  She will continue to take Eliquis for secondary stroke prevention.

## 2025-02-25 DIAGNOSIS — I48.0 PAROXYSMAL ATRIAL FIBRILLATION (HCC): ICD-10-CM

## 2025-02-26 ENCOUNTER — RESULTS FOLLOW-UP (OUTPATIENT)
Age: 65
End: 2025-02-26

## 2025-02-26 DIAGNOSIS — R06.02 SHORTNESS OF BREATH: ICD-10-CM

## 2025-02-26 RX ORDER — APIXABAN 5 MG/1
5 TABLET, FILM COATED ORAL 2 TIMES DAILY
Qty: 60 TABLET | Refills: 0 | Status: SHIPPED | OUTPATIENT
Start: 2025-02-26

## 2025-02-26 RX ORDER — ALBUTEROL SULFATE 90 UG/1
INHALANT RESPIRATORY (INHALATION)
Qty: 6.7 G | Refills: 1 | Status: SHIPPED | OUTPATIENT
Start: 2025-02-26

## 2025-03-01 DIAGNOSIS — I48.0 PAROXYSMAL ATRIAL FIBRILLATION (HCC): ICD-10-CM

## 2025-03-03 RX ORDER — METOPROLOL SUCCINATE 25 MG/1
25 TABLET, EXTENDED RELEASE ORAL DAILY
Qty: 90 TABLET | Refills: 1 | Status: SHIPPED | OUTPATIENT
Start: 2025-03-03

## 2025-03-14 NOTE — TELEPHONE ENCOUNTER
Spoke with pt son Olvin regarding results and went over them with him. Will continue using the nebulizer and will call if any refills are needed.

## 2025-03-25 DIAGNOSIS — I48.0 PAROXYSMAL ATRIAL FIBRILLATION (HCC): ICD-10-CM

## 2025-03-26 DIAGNOSIS — I48.0 PAROXYSMAL ATRIAL FIBRILLATION (HCC): ICD-10-CM

## 2025-03-27 RX ORDER — APIXABAN 5 MG/1
5 TABLET, FILM COATED ORAL 2 TIMES DAILY
Qty: 60 TABLET | Refills: 1 | Status: SHIPPED | OUTPATIENT
Start: 2025-03-27

## 2025-03-30 DIAGNOSIS — J41.0 SIMPLE CHRONIC BRONCHITIS (HCC): ICD-10-CM

## 2025-03-30 RX ORDER — BUDESONIDE AND FORMOTEROL FUMARATE DIHYDRATE 160; 4.5 UG/1; UG/1
AEROSOL RESPIRATORY (INHALATION)
Qty: 10.5 G | Refills: 5 | Status: SHIPPED | OUTPATIENT
Start: 2025-03-30

## 2025-04-01 ENCOUNTER — OFFICE VISIT (OUTPATIENT)
Age: 65
End: 2025-04-01
Payer: COMMERCIAL

## 2025-04-01 VITALS
HEIGHT: 68 IN | OXYGEN SATURATION: 98 % | WEIGHT: 127 LBS | SYSTOLIC BLOOD PRESSURE: 104 MMHG | TEMPERATURE: 97.7 F | HEART RATE: 42 BPM | DIASTOLIC BLOOD PRESSURE: 64 MMHG | BODY MASS INDEX: 19.25 KG/M2

## 2025-04-01 DIAGNOSIS — J41.0 SIMPLE CHRONIC BRONCHITIS (HCC): Primary | ICD-10-CM

## 2025-04-01 DIAGNOSIS — Z87.891 FORMER SMOKER: ICD-10-CM

## 2025-04-01 DIAGNOSIS — N28.9 RENAL LESION: ICD-10-CM

## 2025-04-01 PROCEDURE — 99214 OFFICE O/P EST MOD 30 MIN: CPT | Performed by: PHYSICIAN ASSISTANT

## 2025-04-01 NOTE — ASSESSMENT & PLAN NOTE
Patient doing much better since starting on Symbicort, rare need for her nebulizer or rescue inhaler  She will continue with the Symbicort twice per day, albuterol 4 times per day as needed  PFT done since her last visit with us shows moderate obstruction with bronchodilator response, air trapping and mildly decreased DLCO

## 2025-04-01 NOTE — PROGRESS NOTES
Follow-up  Visit - Pulmonary Medicine   Name: Kimberlyn Ortiz      : 1960      MRN: [id number]  Encounter Provider: Edmar Yepez PA-C  Encounter Date: 2025   Encounter department: [facility] PULMONARY [facility]  :  Assessment & Plan  Simple chronic bronchitis (HCC)  Patient doing much better since starting on Symbicort, rare need for her nebulizer or rescue inhaler  She will continue with the Symbicort twice per day, albuterol 4 times per day as needed  PFT done since her last visit with us shows moderate obstruction with bronchodilator response, air trapping and mildly decreased DLCO       Former smoker  Recent CT lung screening shows no suspicious lung nodules, will continue with annual CT lung screening which will be due in 2026  Orders:    CT lung screening program; Future    Renal lesion  CT lung screening incidental finding of 1.4 cm left renal lesion-renal ultrasound has been ordered to further characterize the lesion  Orders:    US kidney and bladder with pvr; Future      Return in about 6 months (around 10/1/2025).    History of Present Illness   Kimberlyn Ortiz is a 64 y.o. female former smoker with past medical history of COPD, stroke, A-fib who presents for follow-up.  She is doing much better with her breathing since her last visit.  Has been using the Symbicort twice per day, has only used her nebulizer a few times since her last visit.    Review of Systems   Constitutional: Negative.    HENT: Negative.     Respiratory: Negative.     Cardiovascular: Negative.    Gastrointestinal: Negative.    Genitourinary: Negative.    Musculoskeletal: Negative.    Skin: Negative.    Allergic/Immunologic: Negative.    Neurological: Negative.    Psychiatric/Behavioral: Negative.         Aside from what is mentioned in the HPI, ROS is otherwise negative    Current Outpatient Medications on File Prior to Visit   Medication Sig Dispense Refill    albuterol (PROVENTIL HFA,VENTOLIN HFA)  "90 mcg/act inhaler INHALE 2 PUFFS BY MOUTH EVERY 6 HOURS AS NEEDED FOR WHEEZE 6.7 g 1    atorvastatin (LIPITOR) 40 mg tablet Take 1 tablet (40 mg total) by mouth daily 30 tablet 5    budesonide-formoterol (Symbicort) 160-4.5 mcg/act inhaler INHALE 2 PUFFS BY MOUTH 2 TIMES A DAY RINSE MOUTH AFTER USE. 10.5 g 5    Eliquis 5 MG TAKE 1 TABLET BY MOUTH TWICE A DAY 60 tablet 1    fluticasone (FLONASE) 50 mcg/act nasal spray 1 spray into each nostril daily 9.9 mL 1    ipratropium-albuterol (DUO-NEB) 0.5-2.5 mg/3 mL nebulizer solution Take 3 mL by nebulization 4 (four) times a day 30 mL 2    levETIRAcetam (KEPPRA) 500 mg tablet Take 1 tablet (500 mg total) by mouth every 12 (twelve) hours 180 tablet 3    loratadine (CLARITIN) 10 mg tablet Take 1 tablet (10 mg total) by mouth daily 30 tablet 1    metoprolol succinate (TOPROL-XL) 25 mg 24 hr tablet TAKE 1 TABLET (25 MG TOTAL) BY MOUTH DAILY. 90 tablet 1    sertraline (ZOLOFT) 50 mg tablet TAKE 1 TABLET BY MOUTH EVERY DAY 30 tablet 5     No current facility-administered medications on file prior to visit.         Medical History Reviewed by provider this encounter:     .    Objective   /64   Pulse (!) 42   Temp 97.7 °F (36.5 °C)   Ht 5' 8\" (1.727 m)   Wt 57.6 kg (127 lb)   SpO2 98%   BMI 19.31 kg/m²     Physical Exam  Vitals reviewed.   Constitutional:       General: She is not in acute distress.     Appearance: Normal appearance. She is well-developed. She is not ill-appearing.   HENT:      Head: Normocephalic and atraumatic.      Mouth/Throat:      Pharynx: Oropharynx is clear.   Eyes:      Pupils: Pupils are equal, round, and reactive to light.   Cardiovascular:      Rate and Rhythm: Normal rate and regular rhythm.   Pulmonary:      Effort: Pulmonary effort is normal. No respiratory distress.      Breath sounds: Normal breath sounds. No decreased breath sounds, wheezing, rhonchi or rales.   Abdominal:      General: Abdomen is flat. There is no distension. " "  Musculoskeletal:         General: Normal range of motion.      Cervical back: Normal range of motion.      Right lower leg: No edema.      Left lower leg: No edema.   Skin:     General: Skin is warm and dry.      Findings: No rash.   Neurological:      Mental Status: She is alert and oriented to person, place, and time.   Psychiatric:         Mood and Affect: Mood normal.         Behavior: Behavior normal.           Diagnostic Data:  Labs: I personally reviewed the most recent laboratory data pertinent to today's visit.      Radiology results:  Radiology Results Review: I have reviewed radiology reports from February including: CT chest.      PFT/spirometry results:  No results found for: \"FEV1\", \"FVC\", \"PAA3HSQ\", \"TLC\", \"DLCO\"   Moderate obstruction with BD response    Oximetry testing:      Other studies:      Edmar Yepez PA-C      "

## 2025-04-11 DIAGNOSIS — F32.A DEPRESSION, UNSPECIFIED DEPRESSION TYPE: ICD-10-CM

## 2025-04-14 ENCOUNTER — VBI (OUTPATIENT)
Dept: ADMINISTRATIVE | Facility: OTHER | Age: 65
End: 2025-04-14

## 2025-04-14 ENCOUNTER — HOSPITAL ENCOUNTER (OUTPATIENT)
Dept: ULTRASOUND IMAGING | Facility: HOSPITAL | Age: 65
Discharge: HOME | End: 2025-04-14
Payer: COMMERCIAL

## 2025-04-14 DIAGNOSIS — N28.9 RENAL LESION: ICD-10-CM

## 2025-04-14 PROCEDURE — 76770 US EXAM ABDO BACK WALL COMP: CPT

## 2025-04-14 NOTE — TELEPHONE ENCOUNTER
04/14/25 12:17 PM     Chart reviewed for Pap Smear (HPV) aka Cervical Cancer Screening ; nothing is submitted to the patient's insurance at this time.     Monica Izquierdo   [facility]

## 2025-04-23 ENCOUNTER — RESULTS FOLLOW-UP (OUTPATIENT)
Age: 65
End: 2025-04-23

## 2025-04-28 DIAGNOSIS — I48.0 PAROXYSMAL ATRIAL FIBRILLATION (HCC): ICD-10-CM

## 2025-04-30 ENCOUNTER — OFFICE VISIT (OUTPATIENT)
Dept: FAMILY MEDICINE CLINIC | Facility: CLINIC | Age: 65
End: 2025-04-30
Payer: COMMERCIAL

## 2025-04-30 VITALS
HEART RATE: 59 BPM | OXYGEN SATURATION: 95 % | TEMPERATURE: 97.7 F | BODY MASS INDEX: 19.55 KG/M2 | HEIGHT: 68 IN | DIASTOLIC BLOOD PRESSURE: 62 MMHG | SYSTOLIC BLOOD PRESSURE: 118 MMHG | WEIGHT: 129 LBS

## 2025-04-30 DIAGNOSIS — Z86.73 CHRONIC ISCHEMIC LEFT MCA STROKE: ICD-10-CM

## 2025-04-30 DIAGNOSIS — E78.2 MIXED HYPERLIPIDEMIA: ICD-10-CM

## 2025-04-30 DIAGNOSIS — H61.23 BILATERAL IMPACTED CERUMEN: ICD-10-CM

## 2025-04-30 DIAGNOSIS — I69.351 HEMIPLEGIA OF RIGHT DOMINANT SIDE AS LATE EFFECT OF CEREBRAL INFARCTION, UNSPECIFIED HEMIPLEGIA TYPE (HCC): ICD-10-CM

## 2025-04-30 DIAGNOSIS — G40.109 FOCAL EPILEPSY (HCC): ICD-10-CM

## 2025-04-30 DIAGNOSIS — I48.91 ATRIAL FIBRILLATION WITH RVR (HCC): Primary | ICD-10-CM

## 2025-04-30 PROCEDURE — 69210 REMOVE IMPACTED EAR WAX UNI: CPT | Performed by: FAMILY MEDICINE

## 2025-04-30 PROCEDURE — 99214 OFFICE O/P EST MOD 30 MIN: CPT | Performed by: FAMILY MEDICINE

## 2025-04-30 NOTE — ASSESSMENT & PLAN NOTE
Continue with anticogulation and rate control of heart rate. Concerns about condition were addressed today.   Orders:  •  Comprehensive metabolic panel; Future  •  CBC and differential; Future  •  TSH, 3rd generation with Free T4 reflex; Future  •  Magnesium; Future  •  Uric acid; Future  •  UA/M w/rflx Culture, Comp

## 2025-04-30 NOTE — PROGRESS NOTES
Name: Kimberlyn Ortiz      : 1960      MRN: [id number]  Encounter Provider: Vijay Moore MD  Encounter Date: 2025   Encounter department: [facility]  :  Assessment & Plan  Atrial fibrillation with RVR (HCC)  Continue with anticogulation and rate control of heart rate. Concerns about condition were addressed today.   Orders:  •  Comprehensive metabolic panel; Future  •  CBC and differential; Future  •  TSH, 3rd generation with Free T4 reflex; Future  •  Magnesium; Future  •  Uric acid; Future  •  UA/M w/rflx Culture, Comp    Chronic ischemic left MCA stroke  Patient is stable  and will continue present plan of care and reassess at next routine visit. All questions about this problem from patient were answered today.        Focal epilepsy (HCC)  Patient is stable  and will continue present plan of care and reassess at next routine visit. All questions about this problem from patient were answered today.        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, unspecified hemiplegia type (HCC)  Patient is stable  and will continue present plan of care and reassess at next routine visit. All questions about this problem from patient were answered today.   Orders:  •  Comprehensive metabolic panel; Future  •  CBC and differential; Future  •  TSH, 3rd generation with Free T4 reflex; Future  •  Magnesium; Future  •  Uric acid; Future  •  UA/M w/rflx Culture, Comp    Mixed hyperlipidemia  Patient  is stable with current medication and we discussed a low fat low cholesterol diet. Weight loss also discussed for this will help lower cholesterol also. Recheck lipids in 6 months.   Orders:  •  Lipid Panel with Direct LDL reflex; Future    Bilateral impacted cerumen          Depression Screening and Follow-up Plan:   Patient assessed for underlying major depression. Brief counseling provided and recommend additional follow-up/re-evaluation next office visit.      History of Present Illness   64-year-old female here today for checkup for multimedical [facility] patient with history of CVA with right-sided weakness as well as secondary to atrial fibrillation and blood clot.  Patient also hypertension history of tobacco abuse but is on is tobacco free currently.  Patient also has been having some problems with the right third toe she is now starting to wear shoes and that she took her slippers off and she is having her middle toe rub on inside of her shoe of talk to her about using a corn pad to keep the protection on that if it is not improving she is broderick see the podiatrist.  Patient is doing well with her medications I ordered lab work and she is overdue for.  Patient stated earlier is still not smoking and she is going to continue staying off of cigarettes.  Patient's voice has improved since her last time I saw her she is enunciating words much better than previously since she has had her stroke.  Patient also is well with a problem with the cerumen in both ears the left being worse than the right and that was corrected for her today.      Review of Systems   Constitutional:  Negative for activity change, appetite change, fatigue and fever.   HENT:  Positive for hearing loss. Negative for congestion, ear pain, postnasal drip, rhinorrhea, sinus pressure, sinus pain, sneezing and sore throat.    Eyes:  Negative for pain and redness.   Respiratory:  Negative for apnea, cough, chest tightness, shortness of breath and wheezing.    Cardiovascular:  Negative for chest pain, palpitations and leg swelling.   Gastrointestinal:  Negative for abdominal pain, constipation, diarrhea, nausea and vomiting.   Endocrine: Negative for cold intolerance and heat intolerance.   Genitourinary:  Negative for difficulty urinating, dysuria, frequency, hematuria and urgency.   Musculoskeletal:  Positive for gait problem. Negative for arthralgias, back pain and myalgias.   Skin:  Negative for rash.  "  Neurological:  Positive for weakness. Negative for dizziness, speech difficulty, numbness and headaches.   Hematological:  Does not bruise/bleed easily.   Psychiatric/Behavioral:  Negative for agitation, confusion and hallucinations.        Objective   /62 (BP Location: Left arm, Patient Position: Sitting, Cuff Size: Standard)   Pulse 59   Temp 97.7 °F (36.5 °C) (Temporal)   Ht 5' 8\" (1.727 m)   Wt 58.5 kg (129 lb)   SpO2 95%   BMI 19.61 kg/m²      Physical Exam  Constitutional:       Appearance: Normal appearance. She is not ill-appearing.   HENT:      Head: Normocephalic and atraumatic.      Right Ear: Tympanic membrane normal. There is impacted cerumen.      Left Ear: Tympanic membrane normal. There is impacted cerumen.      Nose: Nose normal.      Mouth/Throat:      Mouth: Mucous membranes are moist.   Eyes:      Extraocular Movements: Extraocular movements intact.      Conjunctiva/sclera: Conjunctivae normal.      Pupils: Pupils are equal, round, and reactive to light.   Cardiovascular:      Rate and Rhythm: Normal rate and regular rhythm.   Pulmonary:      Effort: Pulmonary effort is normal. No respiratory distress.      Breath sounds: Normal breath sounds. No wheezing.   Abdominal:      General: Bowel sounds are normal.      Palpations: Abdomen is soft.      Tenderness: There is no abdominal tenderness.   Musculoskeletal:         General: Deformity present. No tenderness.      Cervical back: Normal range of motion and neck supple.      Right lower leg: No edema.      Left lower leg: No edema.   Skin:     General: Skin is warm and dry.   Neurological:      Mental Status: She is alert and oriented to person, place, and time.      Motor: Weakness present.      Gait: Gait abnormal.      Comments: R sided contractures with corn developing on R foot 3rd toe.    Psychiatric:         Mood and Affect: Mood normal.         Behavior: Behavior normal.         Thought Content: Thought content normal.         " Judgment: Judgment normal.     Ear cerumen removal    Date/Time: 4/30/2025 2:45 PM    Performed by: Vijay Moore MD  Authorized by: Vijay Moore MD  Universal Protocol:  Procedure performed by:  Consent: Verbal consent obtained. Written consent not obtained.  Consent given by: patient  Timeout called at: 4/30/2025 3:35 PM.  Patient understanding: patient states understanding of the procedure being performed  Patient consent: the patient's understanding of the procedure matches consent given  Procedure consent: procedure consent does not match procedure scheduled  Relevant documents: relevant documents not present or verified  Test results: test results not available  Site marked: the operative site was not marked  Radiology Images displayed and confirmed. If images not available, report reviewed: imaging studies not available  Patient identity confirmed: verbally with patient    Patient location:  Clinic  Procedure details:     Local anesthetic:  None    Location:  Both ears    Procedure type: irrigation with instrumentation      Instrumentation: loop      Approach:  External  Post-procedure details:     Hearing quality:  Improved

## 2025-05-14 ENCOUNTER — HOSPITAL ENCOUNTER (OUTPATIENT)
Dept: RADIOLOGY | Facility: MEDICAL CENTER | Age: 65
Discharge: HOME | End: 2025-05-14
Payer: COMMERCIAL

## 2025-05-14 ENCOUNTER — APPOINTMENT (OUTPATIENT)
Dept: LAB | Facility: MEDICAL CENTER | Age: 65
End: 2025-05-14
Payer: COMMERCIAL

## 2025-05-14 VITALS — WEIGHT: 129 LBS | HEIGHT: 68 IN | BODY MASS INDEX: 19.55 KG/M2

## 2025-05-14 DIAGNOSIS — R53.83 OTHER FATIGUE: ICD-10-CM

## 2025-05-14 DIAGNOSIS — I69.351 HEMIPLEGIA OF RIGHT DOMINANT SIDE AS LATE EFFECT OF CEREBRAL INFARCTION, UNSPECIFIED HEMIPLEGIA TYPE (HCC): ICD-10-CM

## 2025-05-14 DIAGNOSIS — E78.2 MIXED HYPERLIPIDEMIA: ICD-10-CM

## 2025-05-14 DIAGNOSIS — I48.91 ATRIAL FIBRILLATION WITH RVR (HCC): ICD-10-CM

## 2025-05-14 DIAGNOSIS — Z12.31 ENCOUNTER FOR SCREENING MAMMOGRAM FOR HIGH-RISK PATIENT: ICD-10-CM

## 2025-05-14 LAB
ALBUMIN SERPL BCG-MCNC: 4.4 G/DL (ref 3.5–5)
ALP SERPL-CCNC: 66 U/L (ref 34–104)
ALT SERPL W P-5'-P-CCNC: 15 U/L (ref 7–52)
ANION GAP SERPL CALCULATED.3IONS-SCNC: 9 MMOL/L (ref 4–13)
AST SERPL W P-5'-P-CCNC: 17 U/L (ref 13–39)
BASOPHILS # BLD AUTO: 0.05 THOUSANDS/ÂΜL (ref 0–0.1)
BASOPHILS NFR BLD AUTO: 1 % (ref 0–1)
BILIRUB SERPL-MCNC: 0.39 MG/DL (ref 0.2–1)
BUN SERPL-MCNC: 14 MG/DL (ref 5–25)
CALCIUM SERPL-MCNC: 9.7 MG/DL (ref 8.4–10.2)
CHLORIDE SERPL-SCNC: 100 MMOL/L (ref 96–108)
CHOLEST SERPL-MCNC: 137 MG/DL (ref ?–200)
CO2 SERPL-SCNC: 31 MMOL/L (ref 21–32)
CREAT SERPL-MCNC: 0.74 MG/DL (ref 0.6–1.3)
EOSINOPHIL # BLD AUTO: 0.12 THOUSAND/ÂΜL (ref 0–0.61)
EOSINOPHIL NFR BLD AUTO: 2 % (ref 0–6)
ERYTHROCYTE [DISTWIDTH] IN BLOOD BY AUTOMATED COUNT: 13.4 % (ref 11.6–15.1)
GFR SERPL CREATININE-BSD FRML MDRD: 87 ML/MIN/1.73SQ M
GLUCOSE P FAST SERPL-MCNC: 82 MG/DL (ref 65–99)
HCT VFR BLD AUTO: 40.2 % (ref 34.8–46.1)
HDLC SERPL-MCNC: 56 MG/DL
HGB BLD-MCNC: 12.6 G/DL (ref 11.5–15.4)
IMM GRANULOCYTES # BLD AUTO: 0.02 THOUSAND/UL (ref 0–0.2)
IMM GRANULOCYTES NFR BLD AUTO: 0 % (ref 0–2)
LDLC SERPL CALC-MCNC: 64 MG/DL (ref 0–100)
LYMPHOCYTES # BLD AUTO: 1.75 THOUSANDS/ÂΜL (ref 0.6–4.47)
LYMPHOCYTES NFR BLD AUTO: 31 % (ref 14–44)
MAGNESIUM SERPL-MCNC: 2.3 MG/DL (ref 1.9–2.7)
MCH RBC QN AUTO: 29 PG (ref 26.8–34.3)
MCHC RBC AUTO-ENTMCNC: 31.3 G/DL (ref 31.4–37.4)
MCV RBC AUTO: 92 FL (ref 82–98)
MONOCYTES # BLD AUTO: 0.4 THOUSAND/ÂΜL (ref 0.17–1.22)
MONOCYTES NFR BLD AUTO: 7 % (ref 4–12)
NEUTROPHILS # BLD AUTO: 3.27 THOUSANDS/ÂΜL (ref 1.85–7.62)
NEUTS SEG NFR BLD AUTO: 59 % (ref 43–75)
NRBC BLD AUTO-RTO: 0 /100 WBCS
PLATELET # BLD AUTO: 169 THOUSANDS/UL (ref 149–390)
PMV BLD AUTO: 12.7 FL (ref 8.9–12.7)
POTASSIUM SERPL-SCNC: 3.9 MMOL/L (ref 3.5–5.3)
PROT SERPL-MCNC: 7.8 G/DL (ref 6.4–8.4)
RBC # BLD AUTO: 4.35 MILLION/UL (ref 3.81–5.12)
SODIUM SERPL-SCNC: 140 MMOL/L (ref 135–147)
TRIGL SERPL-MCNC: 87 MG/DL (ref ?–150)
TSH SERPL DL<=0.05 MIU/L-ACNC: 1.2 UIU/ML (ref 0.45–4.5)
URATE SERPL-MCNC: 3.8 MG/DL (ref 2–7.5)
WBC # BLD AUTO: 5.61 THOUSAND/UL (ref 4.31–10.16)

## 2025-05-14 PROCEDURE — 84550 ASSAY OF BLOOD/URIC ACID: CPT

## 2025-05-14 PROCEDURE — 80061 LIPID PANEL: CPT

## 2025-05-14 PROCEDURE — 77067 SCR MAMMO BI INCL CAD: CPT

## 2025-05-14 PROCEDURE — 36415 COLL VENOUS BLD VENIPUNCTURE: CPT

## 2025-05-14 PROCEDURE — 84443 ASSAY THYROID STIM HORMONE: CPT

## 2025-05-14 PROCEDURE — 80053 COMPREHEN METABOLIC PANEL: CPT

## 2025-05-14 PROCEDURE — 85025 COMPLETE CBC W/AUTO DIFF WBC: CPT

## 2025-05-14 PROCEDURE — 83735 ASSAY OF MAGNESIUM: CPT

## 2025-05-14 PROCEDURE — 77063 BREAST TOMOSYNTHESIS BI: CPT

## 2025-05-15 ENCOUNTER — RESULTS FOLLOW-UP (OUTPATIENT)
Dept: FAMILY MEDICINE CLINIC | Facility: CLINIC | Age: 65
End: 2025-05-15

## 2025-05-25 DIAGNOSIS — J01.00 ACUTE NON-RECURRENT MAXILLARY SINUSITIS: ICD-10-CM

## 2025-05-25 RX ORDER — FLUTICASONE PROPIONATE 50 MCG
SPRAY, SUSPENSION (ML) NASAL
Qty: 16 ML | Refills: 1 | Status: SHIPPED | OUTPATIENT
Start: 2025-05-25

## 2025-06-08 DIAGNOSIS — E78.49 OTHER HYPERLIPIDEMIA: ICD-10-CM

## 2025-06-08 RX ORDER — ATORVASTATIN CALCIUM 40 MG/1
40 TABLET, FILM COATED ORAL DAILY
Qty: 30 TABLET | Refills: 5 | Status: SHIPPED | OUTPATIENT
Start: 2025-06-08

## 2025-06-11 ENCOUNTER — VBI (OUTPATIENT)
Dept: ADMINISTRATIVE | Facility: OTHER | Age: 65
End: 2025-06-11

## 2025-06-11 NOTE — TELEPHONE ENCOUNTER
06/11/25 12:00 PM     Chart reviewed for [facility]: Colonoscopy ; nothing is submitted to the patient's insurance at this time.     Leroy Smith MA   [facility]

## 2025-06-17 DIAGNOSIS — R06.02 SHORTNESS OF BREATH: ICD-10-CM

## 2025-06-17 RX ORDER — ALBUTEROL SULFATE 90 UG/1
1 INHALANT RESPIRATORY (INHALATION) EVERY 4 HOURS PRN
Qty: 6.7 G | Refills: 1 | Status: SHIPPED | OUTPATIENT
Start: 2025-06-17

## 2025-06-25 DIAGNOSIS — I48.0 PAROXYSMAL ATRIAL FIBRILLATION (HCC): ICD-10-CM

## 2025-06-25 RX ORDER — APIXABAN 5 MG/1
5 TABLET, FILM COATED ORAL 2 TIMES DAILY
Qty: 60 TABLET | Refills: 5 | Status: SHIPPED | OUTPATIENT
Start: 2025-06-25

## 2025-07-07 ENCOUNTER — TELEPHONE (OUTPATIENT)
Age: 65
End: 2025-07-07

## 2025-07-07 NOTE — TELEPHONE ENCOUNTER
Left voice mail message advising patient that the [facility] schedule is open for October and they are due to see pulmonary provider, Edmar Coronel .Requested they call back to schedule appointment [phone].

## 2025-07-29 ENCOUNTER — RA CDI HCC (OUTPATIENT)
Dept: OTHER | Facility: HOSPITAL | Age: 65
End: 2025-07-29

## 2025-07-30 ENCOUNTER — OFFICE VISIT (OUTPATIENT)
Dept: FAMILY MEDICINE CLINIC | Facility: CLINIC | Age: 65
End: 2025-07-30
Payer: COMMERCIAL

## 2025-07-30 VITALS
WEIGHT: 135 LBS | DIASTOLIC BLOOD PRESSURE: 62 MMHG | BODY MASS INDEX: 20.46 KG/M2 | OXYGEN SATURATION: 97 % | TEMPERATURE: 97.7 F | HEART RATE: 56 BPM | HEIGHT: 68 IN | SYSTOLIC BLOOD PRESSURE: 108 MMHG

## 2025-07-30 DIAGNOSIS — E78.2 MIXED HYPERLIPIDEMIA: ICD-10-CM

## 2025-07-30 DIAGNOSIS — G40.109 FOCAL EPILEPSY (HCC): ICD-10-CM

## 2025-07-30 DIAGNOSIS — G81.02: ICD-10-CM

## 2025-07-30 DIAGNOSIS — I48.91 ATRIAL FIBRILLATION WITH RVR (HCC): Primary | ICD-10-CM

## 2025-07-30 DIAGNOSIS — Z86.73 CHRONIC ISCHEMIC LEFT MCA STROKE: ICD-10-CM

## 2025-07-30 DIAGNOSIS — J41.0 SIMPLE CHRONIC BRONCHITIS (HCC): ICD-10-CM

## 2025-07-30 PROCEDURE — 99214 OFFICE O/P EST MOD 30 MIN: CPT | Performed by: FAMILY MEDICINE

## 2025-07-30 RX ORDER — BUDESONIDE AND FORMOTEROL FUMARATE DIHYDRATE 160; 4.5 UG/1; UG/1
2 AEROSOL RESPIRATORY (INHALATION) 2 TIMES DAILY
Qty: 10.5 G | Refills: 5 | Status: SHIPPED | OUTPATIENT
Start: 2025-07-30

## 2025-08-02 DIAGNOSIS — J01.00 ACUTE NON-RECURRENT MAXILLARY SINUSITIS: ICD-10-CM

## 2025-08-04 ENCOUNTER — TELEPHONE (OUTPATIENT)
Age: 65
End: 2025-08-04

## 2025-08-04 RX ORDER — LORATADINE 10 MG/1
10 TABLET ORAL DAILY
Qty: 30 TABLET | Refills: 5 | Status: SHIPPED | OUTPATIENT
Start: 2025-08-04

## 2025-08-06 ENCOUNTER — EVALUATION (OUTPATIENT)
Age: 65
End: 2025-08-06
Attending: NURSE PRACTITIONER
Payer: COMMERCIAL

## 2025-08-06 DIAGNOSIS — M62.81 RIGHT-SIDED MUSCLE WEAKNESS: ICD-10-CM

## 2025-08-06 DIAGNOSIS — I69.351 HEMIPARESIS AFFECTING RIGHT SIDE AS LATE EFFECT OF STROKE (HCC): Primary | ICD-10-CM

## 2025-08-06 PROCEDURE — 97162 PT EVAL MOD COMPLEX 30 MIN: CPT

## 2025-08-06 PROCEDURE — 97530 THERAPEUTIC ACTIVITIES: CPT

## 2025-08-12 ENCOUNTER — OFFICE VISIT (OUTPATIENT)
Age: 65
End: 2025-08-12
Attending: NURSE PRACTITIONER
Payer: COMMERCIAL

## 2025-08-14 ENCOUNTER — OFFICE VISIT (OUTPATIENT)
Age: 65
End: 2025-08-14
Attending: NURSE PRACTITIONER
Payer: COMMERCIAL

## 2025-08-14 ENCOUNTER — TELEPHONE (OUTPATIENT)
Age: 65
End: 2025-08-14

## 2025-08-19 ENCOUNTER — OFFICE VISIT (OUTPATIENT)
Age: 65
End: 2025-08-19
Attending: NURSE PRACTITIONER
Payer: COMMERCIAL

## 2025-08-19 DIAGNOSIS — I69.351 HEMIPARESIS AFFECTING RIGHT SIDE AS LATE EFFECT OF STROKE (HCC): Primary | ICD-10-CM

## 2025-08-19 DIAGNOSIS — M62.81 RIGHT-SIDED MUSCLE WEAKNESS: ICD-10-CM

## 2025-08-19 DIAGNOSIS — R06.02 SHORTNESS OF BREATH: ICD-10-CM

## 2025-08-19 PROCEDURE — 97112 NEUROMUSCULAR REEDUCATION: CPT

## 2025-08-19 RX ORDER — ALBUTEROL SULFATE 90 UG/1
1 INHALANT RESPIRATORY (INHALATION) EVERY 4 HOURS PRN
Qty: 6.7 G | Refills: 1 | Status: SHIPPED | OUTPATIENT
Start: 2025-08-19

## 2025-08-21 ENCOUNTER — OFFICE VISIT (OUTPATIENT)
Age: 65
End: 2025-08-21
Attending: NURSE PRACTITIONER
Payer: COMMERCIAL

## 2025-08-21 DIAGNOSIS — M62.81 RIGHT-SIDED MUSCLE WEAKNESS: ICD-10-CM

## 2025-08-21 DIAGNOSIS — I69.351 HEMIPARESIS AFFECTING RIGHT SIDE AS LATE EFFECT OF STROKE (HCC): Primary | ICD-10-CM

## 2025-08-21 PROCEDURE — 97112 NEUROMUSCULAR REEDUCATION: CPT
